# Patient Record
Sex: MALE | Race: WHITE | URBAN - METROPOLITAN AREA
[De-identification: names, ages, dates, MRNs, and addresses within clinical notes are randomized per-mention and may not be internally consistent; named-entity substitution may affect disease eponyms.]

---

## 2017-05-24 ENCOUNTER — ED HISTORICAL/CONVERTED ENCOUNTER (OUTPATIENT)
Dept: OTHER | Age: 57
End: 2017-05-24

## 2023-02-21 ENCOUNTER — APPOINTMENT (OUTPATIENT)
Dept: CT IMAGING | Age: 63
DRG: 377 | End: 2023-02-21
Attending: STUDENT IN AN ORGANIZED HEALTH CARE EDUCATION/TRAINING PROGRAM
Payer: MEDICARE

## 2023-02-21 ENCOUNTER — APPOINTMENT (OUTPATIENT)
Dept: GENERAL RADIOLOGY | Age: 63
DRG: 377 | End: 2023-02-21
Attending: EMERGENCY MEDICINE
Payer: MEDICARE

## 2023-02-21 ENCOUNTER — HOSPITAL ENCOUNTER (INPATIENT)
Age: 63
LOS: 9 days | Discharge: HOME | DRG: 377 | End: 2023-03-02
Attending: STUDENT IN AN ORGANIZED HEALTH CARE EDUCATION/TRAINING PROGRAM | Admitting: INTERNAL MEDICINE
Payer: MEDICARE

## 2023-02-21 DIAGNOSIS — D64.9 ANEMIA REQUIRING TRANSFUSIONS: Primary | ICD-10-CM

## 2023-02-21 LAB
ALBUMIN SERPL-MCNC: 3.5 G/DL (ref 3.5–5)
ALBUMIN/GLOB SERPL: 1 (ref 1.1–2.2)
ALP SERPL-CCNC: 187 U/L (ref 45–117)
ALT SERPL-CCNC: 466 U/L (ref 12–78)
ANION GAP SERPL CALC-SCNC: 11 MMOL/L (ref 5–15)
APPEARANCE UR: ABNORMAL
AST SERPL W P-5'-P-CCNC: 277 U/L (ref 15–37)
BACTERIA URNS QL MICRO: NEGATIVE /HPF
BASOPHILS # BLD: 0 K/UL (ref 0–0.1)
BASOPHILS NFR BLD: 0 % (ref 0–1)
BILIRUB SERPL-MCNC: 1 MG/DL (ref 0.2–1)
BILIRUB UR QL: NEGATIVE
BNP SERPL-MCNC: 1621 PG/ML
BUN SERPL-MCNC: 18 MG/DL (ref 6–20)
BUN/CREAT SERPL: 23 (ref 12–20)
CA-I BLD-MCNC: 8.7 MG/DL (ref 8.5–10.1)
CHLORIDE SERPL-SCNC: 108 MMOL/L (ref 97–108)
CO2 SERPL-SCNC: 17 MMOL/L (ref 21–32)
COLOR UR: ABNORMAL
CREAT SERPL-MCNC: 0.78 MG/DL (ref 0.7–1.3)
D DIMER PPP FEU-MCNC: 1.84 UG/ML(FEU)
DIFFERENTIAL METHOD BLD: ABNORMAL
EOSINOPHIL # BLD: 0 K/UL (ref 0–0.4)
EOSINOPHIL NFR BLD: 0 % (ref 0–7)
ERYTHROCYTE [DISTWIDTH] IN BLOOD BY AUTOMATED COUNT: 22.4 % (ref 11.5–14.5)
FLUAV AG NPH QL IA: NEGATIVE
FLUBV AG NOSE QL IA: NEGATIVE
GLOBULIN SER CALC-MCNC: 3.6 G/DL (ref 2–4)
GLUCOSE SERPL-MCNC: 100 MG/DL (ref 65–100)
GLUCOSE UR STRIP.AUTO-MCNC: NEGATIVE MG/DL
HCT VFR BLD AUTO: 8.2 % (ref 36.6–50.3)
HGB BLD-MCNC: 2 G/DL (ref 12.1–17)
HGB UR QL STRIP: NEGATIVE
IMM GRANULOCYTES # BLD AUTO: 0.1 K/UL (ref 0–0.04)
IMM GRANULOCYTES NFR BLD AUTO: 1 % (ref 0–0.5)
KETONES UR QL STRIP.AUTO: NEGATIVE MG/DL
LDH SERPL L TO P-CCNC: 494 U/L (ref 85–241)
LEUKOCYTE ESTERASE UR QL STRIP.AUTO: ABNORMAL
LYMPHOCYTES # BLD: 1.1 K/UL (ref 0.8–3.5)
LYMPHOCYTES NFR BLD: 12 % (ref 12–49)
MCH RBC QN AUTO: 14 PG (ref 26–34)
MCHC RBC AUTO-ENTMCNC: 24.4 G/DL (ref 30–36.5)
MCV RBC AUTO: 57.3 FL (ref 80–99)
MONOCYTES # BLD: 1.2 K/UL (ref 0–1)
MONOCYTES NFR BLD: 13 % (ref 5–13)
NEUTS SEG # BLD: 6.9 K/UL (ref 1.8–8)
NEUTS SEG NFR BLD: 74 % (ref 32–75)
NITRITE UR QL STRIP.AUTO: NEGATIVE
NRBC # BLD: 0.27 K/UL (ref 0–0.01)
NRBC BLD-RTO: 2.9 PER 100 WBC
PH UR STRIP: 5 (ref 5–8)
PLATELET # BLD AUTO: 272 K/UL (ref 150–400)
PMV BLD AUTO: 8.8 FL (ref 8.9–12.9)
POTASSIUM SERPL-SCNC: 4.2 MMOL/L (ref 3.5–5.1)
PROT SERPL-MCNC: 7.1 G/DL (ref 6.4–8.2)
PROT UR STRIP-MCNC: 30 MG/DL
RBC # BLD AUTO: 1.43 M/UL (ref 4.1–5.7)
RBC #/AREA URNS HPF: ABNORMAL /HPF (ref 0–5)
RBC MORPH BLD: ABNORMAL
SARS-COV-2 RDRP RESP QL NAA+PROBE: NOT DETECTED
SODIUM SERPL-SCNC: 136 MMOL/L (ref 136–145)
SP GR UR REFRACTOMETRY: 1.03 (ref 1–1.03)
TROPONIN I SERPL HS-MCNC: 29 NG/L (ref 0–76)
TSH SERPL DL<=0.05 MIU/L-ACNC: 1.77 UIU/ML (ref 0.36–3.74)
UA: UC IF INDICATED,UAUC: ABNORMAL
UROBILINOGEN UR QL STRIP.AUTO: 0.1 EU/DL (ref 0.1–1)
WBC # BLD AUTO: 9.3 K/UL (ref 4.1–11.1)
WBC URNS QL MICRO: ABNORMAL /HPF (ref 0–4)

## 2023-02-21 PROCEDURE — 74011250636 HC RX REV CODE- 250/636: Performed by: STUDENT IN AN ORGANIZED HEALTH CARE EDUCATION/TRAINING PROGRAM

## 2023-02-21 PROCEDURE — 71045 X-RAY EXAM CHEST 1 VIEW: CPT

## 2023-02-21 PROCEDURE — 87635 SARS-COV-2 COVID-19 AMP PRB: CPT

## 2023-02-21 PROCEDURE — 74011250637 HC RX REV CODE- 250/637: Performed by: STUDENT IN AN ORGANIZED HEALTH CARE EDUCATION/TRAINING PROGRAM

## 2023-02-21 PROCEDURE — 36415 COLL VENOUS BLD VENIPUNCTURE: CPT

## 2023-02-21 PROCEDURE — 93005 ELECTROCARDIOGRAM TRACING: CPT

## 2023-02-21 PROCEDURE — 71275 CT ANGIOGRAPHY CHEST: CPT

## 2023-02-21 PROCEDURE — 83615 LACTATE (LD) (LDH) ENZYME: CPT

## 2023-02-21 PROCEDURE — 94761 N-INVAS EAR/PLS OXIMETRY MLT: CPT

## 2023-02-21 PROCEDURE — 87804 INFLUENZA ASSAY W/OPTIC: CPT

## 2023-02-21 PROCEDURE — 74011250637 HC RX REV CODE- 250/637: Performed by: HOSPITALIST

## 2023-02-21 PROCEDURE — 96374 THER/PROPH/DIAG INJ IV PUSH: CPT

## 2023-02-21 PROCEDURE — 87077 CULTURE AEROBIC IDENTIFY: CPT

## 2023-02-21 PROCEDURE — 87186 SC STD MICRODIL/AGAR DIL: CPT

## 2023-02-21 PROCEDURE — 84484 ASSAY OF TROPONIN QUANT: CPT

## 2023-02-21 PROCEDURE — 99285 EMERGENCY DEPT VISIT HI MDM: CPT

## 2023-02-21 PROCEDURE — 82784 ASSAY IGA/IGD/IGG/IGM EACH: CPT

## 2023-02-21 PROCEDURE — 82728 ASSAY OF FERRITIN: CPT

## 2023-02-21 PROCEDURE — 65610000006 HC RM INTENSIVE CARE

## 2023-02-21 PROCEDURE — 36430 TRANSFUSION BLD/BLD COMPNT: CPT

## 2023-02-21 PROCEDURE — 82607 VITAMIN B-12: CPT

## 2023-02-21 PROCEDURE — 81001 URINALYSIS AUTO W/SCOPE: CPT

## 2023-02-21 PROCEDURE — 83880 ASSAY OF NATRIURETIC PEPTIDE: CPT

## 2023-02-21 PROCEDURE — 74011000636 HC RX REV CODE- 636: Performed by: INTERNAL MEDICINE

## 2023-02-21 PROCEDURE — 84443 ASSAY THYROID STIM HORMONE: CPT

## 2023-02-21 PROCEDURE — 74011250636 HC RX REV CODE- 250/636: Performed by: INTERNAL MEDICINE

## 2023-02-21 PROCEDURE — 74011250637 HC RX REV CODE- 250/637: Performed by: INTERNAL MEDICINE

## 2023-02-21 PROCEDURE — 87086 URINE CULTURE/COLONY COUNT: CPT

## 2023-02-21 PROCEDURE — 83540 ASSAY OF IRON: CPT

## 2023-02-21 PROCEDURE — 86900 BLOOD TYPING SEROLOGIC ABO: CPT

## 2023-02-21 PROCEDURE — 83521 IG LIGHT CHAINS FREE EACH: CPT

## 2023-02-21 PROCEDURE — 86923 COMPATIBILITY TEST ELECTRIC: CPT

## 2023-02-21 PROCEDURE — 80053 COMPREHEN METABOLIC PANEL: CPT

## 2023-02-21 PROCEDURE — P9016 RBC LEUKOCYTES REDUCED: HCPCS

## 2023-02-21 PROCEDURE — 74011000250 HC RX REV CODE- 250: Performed by: HOSPITALIST

## 2023-02-21 PROCEDURE — 85025 COMPLETE CBC W/AUTO DIFF WBC: CPT

## 2023-02-21 PROCEDURE — 30233N1 TRANSFUSION OF NONAUTOLOGOUS RED BLOOD CELLS INTO PERIPHERAL VEIN, PERCUTANEOUS APPROACH: ICD-10-PCS | Performed by: INTERNAL MEDICINE

## 2023-02-21 PROCEDURE — 85379 FIBRIN DEGRADATION QUANT: CPT

## 2023-02-21 RX ORDER — FUROSEMIDE 10 MG/ML
40 INJECTION INTRAMUSCULAR; INTRAVENOUS DAILY
Status: ACTIVE | OUTPATIENT
Start: 2023-02-21 | End: 2023-02-23

## 2023-02-21 RX ORDER — FUROSEMIDE 10 MG/ML
40 INJECTION INTRAMUSCULAR; INTRAVENOUS DAILY
Status: DISCONTINUED | OUTPATIENT
Start: 2023-02-22 | End: 2023-02-21

## 2023-02-21 RX ORDER — ACETAMINOPHEN 325 MG/1
975 TABLET ORAL
Status: COMPLETED | OUTPATIENT
Start: 2023-02-21 | End: 2023-02-21

## 2023-02-21 RX ORDER — SODIUM CHLORIDE 0.9 % (FLUSH) 0.9 %
5-40 SYRINGE (ML) INJECTION AS NEEDED
Status: DISCONTINUED | OUTPATIENT
Start: 2023-02-21 | End: 2023-03-02 | Stop reason: HOSPADM

## 2023-02-21 RX ORDER — ACETAMINOPHEN 325 MG/1
650 TABLET ORAL
Status: DISCONTINUED | OUTPATIENT
Start: 2023-02-21 | End: 2023-03-02 | Stop reason: HOSPADM

## 2023-02-21 RX ORDER — LIDOCAINE 4 G/100G
2 PATCH TOPICAL
Status: DISCONTINUED | OUTPATIENT
Start: 2023-02-21 | End: 2023-03-02 | Stop reason: HOSPADM

## 2023-02-21 RX ORDER — LANOLIN ALCOHOL/MO/W.PET/CERES
6 CREAM (GRAM) TOPICAL
Status: DISCONTINUED | OUTPATIENT
Start: 2023-02-21 | End: 2023-03-02 | Stop reason: HOSPADM

## 2023-02-21 RX ORDER — SODIUM CHLORIDE 9 MG/ML
250 INJECTION, SOLUTION INTRAVENOUS AS NEEDED
Status: DISCONTINUED | OUTPATIENT
Start: 2023-02-21 | End: 2023-03-02 | Stop reason: HOSPADM

## 2023-02-21 RX ORDER — PROMETHAZINE HYDROCHLORIDE 25 MG/1
12.5 TABLET ORAL
Status: DISCONTINUED | OUTPATIENT
Start: 2023-02-21 | End: 2023-03-02 | Stop reason: HOSPADM

## 2023-02-21 RX ORDER — SODIUM CHLORIDE 0.9 % (FLUSH) 0.9 %
5-40 SYRINGE (ML) INJECTION EVERY 8 HOURS
Status: DISCONTINUED | OUTPATIENT
Start: 2023-02-21 | End: 2023-03-02 | Stop reason: HOSPADM

## 2023-02-21 RX ORDER — KETOROLAC TROMETHAMINE 30 MG/ML
15 INJECTION, SOLUTION INTRAMUSCULAR; INTRAVENOUS
Status: COMPLETED | OUTPATIENT
Start: 2023-02-21 | End: 2023-02-21

## 2023-02-21 RX ORDER — ONDANSETRON 2 MG/ML
4 INJECTION INTRAMUSCULAR; INTRAVENOUS
Status: DISCONTINUED | OUTPATIENT
Start: 2023-02-21 | End: 2023-03-02 | Stop reason: HOSPADM

## 2023-02-21 RX ORDER — POLYETHYLENE GLYCOL 3350 17 G/17G
17 POWDER, FOR SOLUTION ORAL DAILY PRN
Status: DISCONTINUED | OUTPATIENT
Start: 2023-02-21 | End: 2023-03-02 | Stop reason: HOSPADM

## 2023-02-21 RX ORDER — ACETAMINOPHEN 650 MG/1
650 SUPPOSITORY RECTAL
Status: DISCONTINUED | OUTPATIENT
Start: 2023-02-21 | End: 2023-03-02 | Stop reason: HOSPADM

## 2023-02-21 RX ADMIN — KETOROLAC TROMETHAMINE 15 MG: 30 INJECTION, SOLUTION INTRAMUSCULAR; INTRAVENOUS at 14:34

## 2023-02-21 RX ADMIN — ACETAMINOPHEN 975 MG: 325 TABLET ORAL at 14:34

## 2023-02-21 RX ADMIN — MELATONIN TAB 3 MG 6 MG: 3 TAB at 22:47

## 2023-02-21 RX ADMIN — IOPAMIDOL 100 ML: 755 INJECTION, SOLUTION INTRAVENOUS at 16:46

## 2023-02-21 RX ADMIN — ACETAMINOPHEN 650 MG: 325 TABLET ORAL at 22:47

## 2023-02-21 RX ADMIN — FUROSEMIDE 40 MG: 10 INJECTION, SOLUTION INTRAMUSCULAR; INTRAVENOUS at 20:37

## 2023-02-21 NOTE — ED NOTES
Hematologist at bedside discussing plan of care. Pt denies any needs or concerns at this time. Blood continues to infuse without difficulty.

## 2023-02-21 NOTE — PROGRESS NOTES
Admission Medication Reconciliation:    Information obtained from:  Patient    Comments/Recommendations: Reviewed PTA medications and patient's allergies. No medications and no allergies    Pharmacy: [facility])      Allergies:  Patient has no known allergies. Significant PMH/Disease States: No past medical history on file.   Chief Complaint for this Admission:    Chief Complaint   Patient presents with    Shortness of Breath     Prior to Admission Medications:   None       Yahaira Hernandez

## 2023-02-21 NOTE — PROGRESS NOTES
Reason for Admission:  SOB                     RUR Score:   N/A                  Plan for utilizing home health: Not at this time         PCP: First and Last name:  None     Name of Practice:    Are you a current patient: Yes/No:    Approximate date of last visit:    Can you participate in a virtual visit with your PCP:                     Current Advanced Directive/Advance Care Plan: No Order      Healthcare Decision Maker:   Click here to complete [address] including selection of the [address] Relationship (ie \"Primary\")         Rubin Norris. Son, [phone]                    Transition of Care Plan:      Met f/f with Pt, he confirmed that the information on the face sheet is correct. Pt stated that he lives by himself. No HH, he has a wheelchair, and independent with ADL    Send Rx to [facility] in [facility] and [facility]. Family will transport when D/C. CM dispo: Home with no needs.

## 2023-02-21 NOTE — PROGRESS NOTES
History & Physical    Primary Care Provider: None  Source of Information: Patient/family     History of Presenting Illness:   Blayne Granger is a 60 y.o. male who presents with shortness of breath. He had past medical history of COPD and paraplegia. He reports to be experiencing worsening shortness of breath on exertion and must rest to regain his breath. He reports to be having difficult time forming sentences while short of breath. He reports chronic pain in his left axillae, left lower extremity and low back at the level of t10-l1 which apparently is the same level of his spinal damage. He is not very mobile and uses a wheel chair when not using leg bracing. He denies cough, chest pain, changes in bowel habits, changes in urinary frequency, no melena, or streaking in stool. He is passing flatus. 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 He was previously a 1ppd smoker for 20 years but quit 35 years ago. He does not drink alcohol. He does not use recreational drugs. He denies vaccinations and refuses offers for age-appropriate vaccinations (flu, covid, pneumococcal 13). His diet is poor per himself and his son. He does not have a PCP. He has history of amputations of the toes. Review of Systems:  Pertinent items are noted in the History of Present Illness. No past medical history on file. No past surgical history on file. Prior to Admission medications    Not on File       No Known Allergies     No family history on file. Social History     Socioeconomic History    Marital status: SINGLE            CODE STATUS:  DNR    Full x   Other      Objective:     Physical Exam:     Visit Vitals  /61   Pulse 99   Temp 98.2 °F (36.8 °C)   Resp 12   Ht 5' 8\" (1.727 m)   Wt 54.4 kg (120 lb)   SpO2 100%   BMI 18.25 kg/m²      O2 Device: None (Room air)    General:  Alert, cooperative, no distress, appears stated age. Cachetic. Head:  Normocephalic, without obvious abnormality, atraumatic. Eyes:  Conjunctivae/corneas clear. PERRL, EOMs intact. Nose: Nares normal. Septum midline. Mucosa normal. No drainage or sinus tenderness. Throat: Lips, mucosa, and tongue normal. Teeth and gums normal.   Neck: Supple, symmetrical, trachea midline, no adenopathy, thyroid: no enlargement/tenderness/nodules, no carotid bruit and no JVD. Back:   Symmetric, no curvature. ROM normal. No CVA tenderness. Lungs:   Clear to auscultation bilaterally. Chest wall:  No tenderness or deformity. Heart:  Regular rate and rhythm, S1, S2 normal, no murmur, click, rub or gallop. Abdomen:   Soft, non-tender. Bowel sounds normal. No masses,  No organomegaly. Extremities: Extremities normal, atraumatic, no cyanosis or edema. Pulses: 2+ and symmetric all extremities. Skin: Skin color pale, texture normal, turgor normal. No rashes or lesions   Neurologic: CNII-XII intact. Lower extremity paraplegia.         24 Hour Results:    Recent Results (from the past 24 hour(s))   TYPE & SCREEN    Collection Time: 02/21/23  1:08 PM   Result Value Ref Range    Crossmatch Expiration 02/24/2023,2359     ABO/Rh(D) A Positive     Antibody screen Negative     Unit number [id number]     Blood component type [facility]     Unit division 00     Status of unit [place] to transfuse     Crossmatch result Compatible     Unit number [id number]     Blood component type [facility]     Unit division 00     Status of unit [place] to transfuse     Crossmatch result Compatible     Unit number [id number]     Blood component type [facility]     Unit division 00     Status of unit [address] to transfuse     Crossmatch result Compatible    METABOLIC PANEL, COMPREHENSIVE    Collection Time: 02/21/23  1:09 PM   Result Value Ref Range    Sodium 136 136 - 145 mmol/L    Potassium 4.2 3.5 - 5.1 mmol/L    Chloride 108 97 - 108 mmol/L    CO2 17 (L) 21 - 32 mmol/L    Anion gap 11 5 - 15 mmol/L    Glucose 100 65 - 100 mg/dL    BUN 18 6 - 20 mg/dL    Creatinine 0.78 0.70 - 1.30 mg/dL    BUN/Creatinine ratio 23 (H) 12 - 20      eGFR >60 >60 ml/min/1.73m2    Calcium 8.7 8.5 - 10.1 mg/dL    Bilirubin, total 1.0 0.2 - 1.0 mg/dL    AST (SGOT) 277 (H) 15 - 37 U/L    ALT (SGPT) 466 (H) 12 - 78 U/L    Alk. phosphatase 187 (H) 45 - 117 U/L    Protein, total 7.1 6.4 - 8.2 g/dL    Albumin 3.5 3.5 - 5.0 g/dL    Globulin 3.6 2.0 - 4.0 g/dL    A-G Ratio 1.0 (L) 1.1 - 2.2     TROPONIN-HIGH SENSITIVITY    Collection Time: 02/21/23  1:09 PM   Result Value Ref Range    Troponin-High Sensitivity 29 0 - 76 ng/L   D DIMER    Collection Time: 02/21/23  2:32 PM   Result Value Ref Range    D DIMER 1.84 (H) <0.50 ug/ml(FEU)   COVID-19 RAPID TEST    Collection Time: 02/21/23  2:32 PM   Result Value Ref Range    COVID-19 rapid test Not Detected Not Detected     INFLUENZA A & B AG (RAPID TEST)    Collection Time: 02/21/23  2:32 PM   Result Value Ref Range    Influenza A Antigen Negative Negative      Influenza B Antigen Negative Negative     NT-PRO BNP    Collection Time: 02/21/23  2:32 PM   Result Value Ref Range    NT pro-BNP 1,621 (H) <125 pg/mL   CBC WITH AUTOMATED DIFF    Collection Time: 02/21/23  3:47 PM   Result Value Ref Range    WBC 9.3 4.1 - 11.1 K/uL    RBC 1.43 (L) 4.10 - 5.70 M/uL    HGB 2.0 (LL) 12.1 - 17.0 g/dL    HCT 8.2 (LL) 36.6 - 50.3 %    MCV 57.3 (L) 80.0 - 99.0 FL    MCH 14.0 (L) 26.0 - 34.0 PG    MCHC 24.4 (L) 30.0 - 36.5 g/dL    RDW 22.4 (H) 11.5 - 14.5 %    PLATELET 992 501 - 114 K/uL    MPV 8.8 (L) 8.9 - 12.9 FL    NRBC 2.9 (H) 0.0  WBC    ABSOLUTE NRBC 0.27 (H) 0.00 - 0.01 K/uL    NEUTROPHILS 74 32 - 75 %    LYMPHOCYTES 12 12 - 49 %    MONOCYTES 13 5 - 13 %    EOSINOPHILS 0 0 - 7 %    BASOPHILS 0 0 - 1 %    IMMATURE GRANULOCYTES 1 (H) 0 - 0.5 %    ABS. NEUTROPHILS 6.9 1.8 - 8.0 K/UL    ABS. LYMPHOCYTES 1.1 0.8 - 3.5 K/UL    ABS. MONOCYTES 1.2 (H) 0.0 - 1.0 K/UL    ABS. EOSINOPHILS 0.0 0.0 - 0.4 K/UL    ABS. BASOPHILS 0.0 0.0 - 0.1 K/UL    ABS. IMM. GRANS. 0.1 (H) 0.00 - 0.04 K/UL    DF Smear Scanned      RBC COMMENTS Anisocytosis  2+        RBC COMMENTS Hypochromia  4+        RBC COMMENTS Microcytosis  3+        RBC COMMENTS Polychromasia  1+        RBC COMMENTS Schistocytes  Present             Imaging:   XR CHEST SNGL V   Final Result   Central pulmonary vascular congestion versus perihilar opacities   that can be seen with a viral process. CTA CHEST W OR W WO CONT    (Results Pending)           Assessment:     Acute exacerbation of COPD  Poor nutrition  Anemia of chronic disease  Paraplegia lower extremities      Discussion/Medical Decision Making: Patient with numerous medical comorbidities, each with increased risk for mortality and morbidity if left untreated. Patient requires medications with high risk of toxicity and need for intensive monitoring. I have reviewed patient's presenting subjective and objective findings, as well as all laboratory studies, imaging studies, and vital signs to date as well as treatment rendered and patient's response to those treatments. In addition, prior medical, surgical and relevant social and family histories were reviewed. Plan:     Begin solumedrol  Begin abx  Begin high calorie, high protein diet  Trend BNP, AM repeat  Consult Heme/Onc pending CT read        CODE STATUS:    Social determinants of health: None known      Home medications were reviewed    Signed By: Donovan Newell     February 21, 2023         *ATTENTION:  This note has been created by a medical student for educational purposes only. Please do not refer to the content of this note for clinical decision-making, billing, or other purposes. Please see attending physicians note to obtain clinical information on this patient. *

## 2023-02-21 NOTE — H&P
History & Physical    Primary Care Provider: None  Source of Information: Patient/family     History of Presenting Illness:   Narda Monteiro is a 60 y.o. male who presents with shortness of breath. He had past medical history of COPD and paraplegia. He reports to be experiencing worsening shortness of breath on exertion and must rest to regain his breath. He reports to be having difficult time forming sentences while short of breath. He reports chronic pain in his left axillae, left lower extremity and low back at the level of t10-l1 which apparently is the same level of his spinal damage. He is not very mobile and uses a wheel chair when not using leg bracing. He denies cough, chest pain, changes in bowel habits, changes in urinary frequency, no melena, or streaking in stool. He is passing flatus. 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 He was previously a 1ppd smoker for 20 years but quit 35 years ago. He does not drink alcohol. He does not use recreational drugs. He denies vaccinations and refuses offers for age-appropriate vaccinations (flu, covid, pneumococcal 13). His diet is poor per himself and his son. He does not have a PCP. He has history of amputations of the toes. Review of Systems:  Pertinent items are noted in the History of Present Illness. No past medical history on file. No past surgical history on file. Prior to Admission medications    Not on File       No Known Allergies     No family history on file. Social History     Socioeconomic History    Marital status: SINGLE            CODE STATUS:  DNR    Full x   Other      Objective:     Physical Exam:     Visit Vitals  /65   Pulse 100   Temp 98.3 °F (36.8 °C)   Resp 12   Ht 5' 8\" (1.727 m)   Wt 54.4 kg (120 lb)   SpO2 100%   BMI 18.25 kg/m²      O2 Device: None (Room air)    General:  Alert, cooperative, no distress, appears stated age. Cachetic. Head:  Normocephalic, without obvious abnormality, atraumatic. Eyes:  Conjunctivae/corneas clear. PERRL, EOMs intact. Nose: Nares normal. Septum midline. Mucosa normal. No drainage or sinus tenderness. Throat: Lips, mucosa, and tongue normal. Teeth and gums normal.   Neck: Supple, symmetrical, trachea midline, no adenopathy, thyroid: no enlargement/tenderness/nodules, no carotid bruit and no JVD. Back:   Symmetric, no curvature. ROM normal. No CVA tenderness. Lungs:   Clear to auscultation bilaterally. Chest wall:  No tenderness or deformity. Heart:  Regular rate and rhythm, S1, S2 normal, no murmur, click, rub or gallop. Abdomen:   Soft, non-tender. Bowel sounds normal. No masses,  No organomegaly. Extremities: Extremities normal, atraumatic, no cyanosis or edema. Pulses: 2+ and symmetric all extremities. Skin: Skin color pale, texture normal, turgor normal. No rashes or lesions   Neurologic: CNII-XII intact. Lower extremity paraplegia.       BL foot drop  Indwelling urinary catheter  24 Hour Results:    Recent Results (from the past 24 hour(s))   TYPE & SCREEN    Collection Time: 02/21/23  1:08 PM   Result Value Ref Range    Crossmatch Expiration 02/24/2023,2359     ABO/Rh(D) A Positive     Antibody screen Negative     Unit number [id number]     Blood component type [facility]     Unit division 00     Status of unit [place] to transfuse     Crossmatch result Compatible     Unit number [id number]     Blood component type [facility]     Unit division 00     Status of unit [address] to transfuse     Crossmatch result Compatible     Unit number [id number]     Blood component type [facility]     Unit division 00     Status of unit [address] to transfuse     Crossmatch result Compatible    METABOLIC PANEL, COMPREHENSIVE    Collection Time: 02/21/23  1:09 PM   Result Value Ref Range    Sodium 136 136 - 145 mmol/L    Potassium 4.2 3.5 - 5.1 mmol/L    Chloride 108 97 - 108 mmol/L    CO2 17 (L) 21 - 32 mmol/L    Anion gap 11 5 - 15 mmol/L    Glucose 100 65 - 100 mg/dL    BUN 18 6 - 20 mg/dL    Creatinine 0.78 0.70 - 1.30 mg/dL    BUN/Creatinine ratio 23 (H) 12 - 20      eGFR >60 >60 ml/min/1.73m2    Calcium 8.7 8.5 - 10.1 mg/dL    Bilirubin, total 1.0 0.2 - 1.0 mg/dL    AST (SGOT) 277 (H) 15 - 37 U/L    ALT (SGPT) 466 (H) 12 - 78 U/L    Alk. phosphatase 187 (H) 45 - 117 U/L    Protein, total 7.1 6.4 - 8.2 g/dL    Albumin 3.5 3.5 - 5.0 g/dL    Globulin 3.6 2.0 - 4.0 g/dL    A-G Ratio 1.0 (L) 1.1 - 2.2     TROPONIN-HIGH SENSITIVITY    Collection Time: 02/21/23  1:09 PM   Result Value Ref Range    Troponin-High Sensitivity 29 0 - 76 ng/L   D DIMER    Collection Time: 02/21/23  2:32 PM   Result Value Ref Range    D DIMER 1.84 (H) <0.50 ug/ml(FEU)   COVID-19 RAPID TEST    Collection Time: 02/21/23  2:32 PM   Result Value Ref Range    COVID-19 rapid test Not Detected Not Detected     INFLUENZA A & B AG (RAPID TEST)    Collection Time: 02/21/23  2:32 PM   Result Value Ref Range    Influenza A Antigen Negative Negative      Influenza B Antigen Negative Negative     NT-PRO BNP    Collection Time: 02/21/23  2:32 PM   Result Value Ref Range    NT pro-BNP 1,621 (H) <125 pg/mL   CBC WITH AUTOMATED DIFF    Collection Time: 02/21/23  3:47 PM   Result Value Ref Range    WBC 9.3 4.1 - 11.1 K/uL    RBC 1.43 (L) 4.10 - 5.70 M/uL    HGB 2.0 (LL) 12.1 - 17.0 g/dL    HCT 8.2 (LL) 36.6 - 50.3 %    MCV 57.3 (L) 80.0 - 99.0 FL    MCH 14.0 (L) 26.0 - 34.0 PG    MCHC 24.4 (L) 30.0 - 36.5 g/dL    RDW 22.4 (H) 11.5 - 14.5 %    PLATELET 968 576 - 516 K/uL    MPV 8.8 (L) 8.9 - 12.9 FL    NRBC 2.9 (H) 0.0  WBC    ABSOLUTE NRBC 0.27 (H) 0.00 - 0.01 K/uL    NEUTROPHILS 74 32 - 75 %    LYMPHOCYTES 12 12 - 49 %    MONOCYTES 13 5 - 13 %    EOSINOPHILS 0 0 - 7 %    BASOPHILS 0 0 - 1 %    IMMATURE GRANULOCYTES 1 (H) 0 - 0.5 %    ABS. NEUTROPHILS 6.9 1.8 - 8.0 K/UL    ABS. LYMPHOCYTES 1.1 0.8 - 3.5 K/UL    ABS. MONOCYTES 1.2 (H) 0.0 - 1.0 K/UL    ABS. EOSINOPHILS 0.0 0.0 - 0.4 K/UL    ABS. BASOPHILS 0.0 0.0 - 0.1 K/UL    ABS. IMM. GRANS. 0.1 (H) 0.00 - 0.04 K/UL    DF Smear Scanned      RBC COMMENTS Anisocytosis  2+        RBC COMMENTS Hypochromia  4+        RBC COMMENTS Microcytosis  3+        RBC COMMENTS Polychromasia  1+        RBC COMMENTS Schistocytes  Present             Imaging:   XR CHEST SNGL V   Final Result   Central pulmonary vascular congestion versus perihilar opacities   that can be seen with a viral process. CTA CHEST W OR W WO CONT    (Results Pending)           Assessment:   Acute CHF, high output due to severe anemia  Severe chronic anemia  Congestive Hepatitis  Acute exacerbation of COPD  Protein calorie malnutrition  Acute on Chronic anemia  Paraperesis lower extremities  BL foot drop  Indwelling luu cathater      Discussion/Medical Decision Making: Patient with numerous medical comorbidities, each with increased risk for mortality and morbidity if left untreated. Patient requires medications with high risk of toxicity and need for intensive monitoring. I have reviewed patient's presenting subjective and objective findings, as well as all laboratory studies, imaging studies, and vital signs to date as well as treatment rendered and patient's response to those treatments. In addition, prior medical, surgical and relevant social and family histories were reviewed. Plan: We will proceed with transfusion. We will give Lasix  Obtain B12 and folate, TSH,   await read on CTA  Consult Heme/Onc pending CT read  We will not give IV fluids at this time because he is a risk of fluid overload.           CODE STATUS: Full    Social determinants of health: None known      Home medications were reviewed    Signed By: Pura Amaya MD     February 21, 2023

## 2023-02-21 NOTE — ED PROVIDER NOTES
[facility] EMERGENCY DEPT  EMERGENCY DEPARTMENT HISTORY AND PHYSICAL EXAM      Date: 2/21/2023  Patient Name: Pretty Beltran  MRN: [id number]  [place] 1960  Date of evaluation: 2/21/2023  Provider: Richard Zeng MD   Note Started: 2:48 PM 2/21/23    HISTORY OF PRESENT ILLNESS     Chief Complaint   Patient presents with    Shortness of Breath       History Provided By: Patient    HPI: Pretty Beltran, 60 y.o. male with history of partial lower extremity paraplegia presents for evaluation of dyspnea. He reports feeling intermittently short of breath over the last week. This happens with any level of cardiac activity as well as rolling over in bed. He is able to lay flat without feeling short of breath. He experienced left-sided chest pain yesterday that is intermittent, underneath his armpit. This is since resolved. No interventions attempted at home. No history of cardiac disease personally or in his family. Tanda Bun He reports possible history of of COPD diagnosis, but does not currently take any medications for this. No history of blood clots, no lower extremity pain or edema no prolonged immobilizations. PAST MEDICAL HISTORY   Past Medical History:  No past medical history on file. Past Surgical History:  No past surgical history on file. Family History:  No family history on file. Social History: Allergies:  No Known Allergies    PCP: None    Current Meds:   Previous Medications    No medications on file       REVIEW OF SYSTEMS   Review of Systems   Constitutional:  Negative for fever. HENT:  Negative for congestion. Respiratory:  Positive for shortness of breath. Negative for cough. Cardiovascular:  Negative for chest pain. Gastrointestinal:  Negative for abdominal pain, constipation, nausea and vomiting. Genitourinary:  Negative for dysuria. Musculoskeletal:  Negative for arthralgias and myalgias. Skin:  Negative for rash.    Allergic/Immunologic: Negative for immunocompromised state. Neurological:  Negative for syncope. Psychiatric/Behavioral:  Negative for confusion. Positives and Pertinent negatives as per HPI. PHYSICAL EXAM     ED Triage Vitals [02/21/23 1308]   ED Encounter Vitals Group      /61      Pulse (Heart Rate) (!) 104      Resp Rate 28      Temp 99.2 °F (37.3 °C)      Temp src       O2 Sat (%) 100 %      Weight 120 lb      Height 5' 8\"     Physical Exam  Vitals reviewed. Constitutional:       General: He is not in acute distress. Appearance: He is not toxic-appearing. HENT:      Head: Normocephalic and atraumatic. Eyes:      Extraocular Movements: Extraocular movements intact. Pupils: Pupils are equal, round, and reactive to light. Cardiovascular:      Rate and Rhythm: Regular rhythm. Tachycardia present. Heart sounds: Normal heart sounds. Pulmonary:      Effort: Pulmonary effort is normal.      Breath sounds: Normal breath sounds. Abdominal:      Palpations: Abdomen is soft. Tenderness: There is no abdominal tenderness. Musculoskeletal:      Right lower leg: No edema. Left lower leg: No edema. Skin:     General: Skin is warm and dry. Coloration: Skin is pale. Neurological:      General: No focal deficit present. Mental Status: He is alert.    Psychiatric:         Mood and Affect: Mood normal.         Behavior: Behavior normal.          SCREENINGS               No data recorded        LAB, EKG AND DIAGNOSTIC RESULTS   Labs:  Recent Results (from the past 12 hour(s))   TYPE & SCREEN    Collection Time: 02/21/23  1:08 PM   Result Value Ref Range    Crossmatch Expiration 02/24/2023,2359     ABO/Rh(D) A Positive     Antibody screen Negative     Unit number [id number]     Blood component type [facility]     Unit division 00     Status of unit [place] to transfuse     Crossmatch result Compatible     Unit number [id number]     Blood component type  LR     Unit division 00     Status of unit Allocated     TRANSFUSION STATUS Ok to transfuse     Crossmatch result Compatible     Unit number [id number]     Blood component type RC LR     Unit division 00     Status of unit [address] to transfuse     Crossmatch result Compatible    METABOLIC PANEL, COMPREHENSIVE    Collection Time: 02/21/23  1:09 PM   Result Value Ref Range    Sodium 136 136 - 145 mmol/L    Potassium 4.2 3.5 - 5.1 mmol/L    Chloride 108 97 - 108 mmol/L    CO2 17 (L) 21 - 32 mmol/L    Anion gap 11 5 - 15 mmol/L    Glucose 100 65 - 100 mg/dL    BUN 18 6 - 20 mg/dL    Creatinine 0.78 0.70 - 1.30 mg/dL    BUN/Creatinine ratio 23 (H) 12 - 20      eGFR >60 >60 ml/min/1.73m2    Calcium 8.7 8.5 - 10.1 mg/dL    Bilirubin, total 1.0 0.2 - 1.0 mg/dL    AST (SGOT) 277 (H) 15 - 37 U/L    ALT (SGPT) 466 (H) 12 - 78 U/L    Alk.  phosphatase 187 (H) 45 - 117 U/L    Protein, total 7.1 6.4 - 8.2 g/dL    Albumin 3.5 3.5 - 5.0 g/dL    Globulin 3.6 2.0 - 4.0 g/dL    A-G Ratio 1.0 (L) 1.1 - 2.2     TROPONIN-HIGH SENSITIVITY    Collection Time: 02/21/23  1:09 PM   Result Value Ref Range    Troponin-High Sensitivity 29 0 - 76 ng/L   D DIMER    Collection Time: 02/21/23  2:32 PM   Result Value Ref Range    D DIMER 1.84 (H) <0.50 ug/ml(FEU)   COVID-19 RAPID TEST    Collection Time: 02/21/23  2:32 PM   Result Value Ref Range    COVID-19 rapid test Not Detected Not Detected     INFLUENZA A & B AG (RAPID TEST)    Collection Time: 02/21/23  2:32 PM   Result Value Ref Range    Influenza A Antigen Negative Negative      Influenza B Antigen Negative Negative     NT-PRO BNP    Collection Time: 02/21/23  2:32 PM   Result Value Ref Range    NT pro-BNP 1,621 (H) <125 pg/mL   CBC WITH AUTOMATED DIFF    Collection Time: 02/21/23  3:47 PM   Result Value Ref Range    WBC 9.3 4.1 - 11.1 K/uL    RBC 1.43 (L) 4.10 - 5.70 M/uL    HGB 2.0 (LL) 12.1 - 17.0 g/dL    HCT 8.2 (LL) 36.6 - 50.3 %    MCV 57.3 (L) 80.0 - 99.0 FL    MCH 14.0 (L) 26.0 - 34.0 PG    MCHC 24.4 (L) 30.0 - 36.5 g/dL    RDW 22.4 (H) 11.5 - 14.5 %    PLATELET 666 109 - 820 K/uL    MPV 8.8 (L) 8.9 - 12.9 FL    NRBC 2.9 (H) 0.0  WBC    ABSOLUTE NRBC 0.27 (H) 0.00 - 0.01 K/uL    NEUTROPHILS 74 32 - 75 %    LYMPHOCYTES 12 12 - 49 %    MONOCYTES 13 5 - 13 %    EOSINOPHILS 0 0 - 7 %    BASOPHILS 0 0 - 1 %    IMMATURE GRANULOCYTES 1 (H) 0 - 0.5 %    ABS. NEUTROPHILS 6.9 1.8 - 8.0 K/UL    ABS. LYMPHOCYTES 1.1 0.8 - 3.5 K/UL    ABS. MONOCYTES 1.2 (H) 0.0 - 1.0 K/UL    ABS. EOSINOPHILS 0.0 0.0 - 0.4 K/UL    ABS. BASOPHILS 0.0 0.0 - 0.1 K/UL    ABS. IMM. GRANS. 0.1 (H) 0.00 - 0.04 K/UL    DF Smear Scanned      RBC COMMENTS Anisocytosis  2+        RBC COMMENTS Hypochromia  4+        RBC COMMENTS Microcytosis  3+        RBC COMMENTS Polychromasia  1+        RBC COMMENTS Schistocytes  Present           Radiologic Studies:  Interpretation per the Radiologist below, if available at the time of this note:  XR CHEST SNGL V    Result Date: 2/21/2023  EXAM:  XR CHEST SNGL V INDICATION: Shortness of breath COMPARISON: None. TECHNIQUE: Portable AP upright chest view at 1344 hours FINDINGS: The cardiomediastinal contours are within normal limits. There is central pulmonary vascular congestion versus perihilar opacities. There is no pleural effusion or pneumothorax. There are degenerative changes in the left shoulder. The upper abdomen is unremarkable. Central pulmonary vascular congestion versus perihilar opacities that can be seen with a viral process.        PROCEDURES   Unless otherwise noted below, none  Performed by: Rg Bucio MD   Procedures        EMERGENCY DEPARTMENT COURSE and DIFFERENTIAL DIAGNOSIS/MDM   Vitals:    Vitals:    02/21/23 1320 02/21/23 1715 02/21/23 1730 02/21/23 1800   BP:  112/61 121/65 122/70   Pulse:  99 100 (!) 105   Resp:  12 12 14   Temp:  98.2 °F (36.8 °C) 98.3 °F (36.8 °C) 98.3 °F (36.8 °C)   SpO2: 100% 100% 100% 100%   Weight:       Height: Patient was given the following medications:  Medications   0.9% sodium chloride infusion 250 mL (has no administration in time range)   furosemide (LASIX) injection 40 mg (has no administration in time range)   sodium chloride (NS) flush 5-40 mL (has no administration in time range)   sodium chloride (NS) flush 5-40 mL (has no administration in time range)   acetaminophen (TYLENOL) tablet 650 mg (has no administration in time range)     Or   acetaminophen (TYLENOL) suppository 650 mg (has no administration in time range)   polyethylene glycol (MIRALAX) packet 17 g (has no administration in time range)   promethazine (PHENERGAN) tablet 12.5 mg (has no administration in time range)     Or   ondansetron (ZOFRAN) injection 4 mg (has no administration in time range)   ketorolac (TORADOL) injection 15 mg (15 mg IntraVENous Given 2/21/23 1434)   acetaminophen (TYLENOL) tablet 975 mg (975 mg Oral Given 2/21/23 1434)   iopamidoL (ISOVUE-370) 370 mg iodine /mL (76 %) injection 100 mL (100 mL IntraVENous Given 2/21/23 1646)       CC/HPI Summary, DDx, ED Course, and Reassessment:   39-year-old male presents for evaluation of dyspnea. Clear lungs on exam so do not suspect restrictive process. He is notably pale. Slightly tachycardic. Differential includes anemia versus significant electrolyte imbalance, liver or kidney dysfunction, pneumonia, PE, process. Initial evaluation with CBC, BMP, BNP, troponin, EKG, chest x-ray, D-dimer  ED Course as of 02/21/23 1843   Tue Feb 21, 2023   1341 ECG performed at 1304 and interpreted by me shows sinus tachycardia with a short MS, interval for MS is 94, ventricular rate is 110, no ST elevation or depression [BQ]   1404 XR CHEST SNGL V  IMPRESSION  Central pulmonary vascular congestion versus perihilar opacities  that can be seen with a viral process.   [BQ]   1607 HGB(!!): 2.0  Will transfuse 3 units and admitted for further work-up [BQ]      ED Course User Index  [BQ] Sarah Summers Ambar Sharma MD           ED FINAL IMPRESSION     1. Anemia requiring transfusions          DISPOSITION/PLAN   Admitted    Admit Note: Pt is being admitted by [facility]. The results of their tests and reason(s) for their admission have been discussed with pt and/or available family. They convey agreement and understanding for the need to be admitted and for the admission diagnosis. I am the Primary Clinician of Record. Holly Medina MD (electronically signed)    (Please note that parts of this dictation were completed with voice recognition software. Quite often unanticipated grammatical, syntax, homophones, and other interpretive errors are inadvertently transcribed by the computer software. Please disregards these errors.  Please excuse any errors that have escaped final proofreading.)

## 2023-02-21 NOTE — ED TRIAGE NOTES
61 y/o paraplegic who summoned EMS for difficxulty breathing whic he has had x1 week or so. Reports dx: COPD approx 10 years ago but takes no meds for this.  Pt is A&O, very pale

## 2023-02-22 LAB
ALBUMIN SERPL-MCNC: 3 G/DL (ref 3.5–5)
ANION GAP SERPL CALC-SCNC: 6 MMOL/L (ref 5–15)
ATRIAL RATE: 110 BPM
BASOPHILS # BLD: 0 K/UL (ref 0–0.1)
BASOPHILS NFR BLD: 1 % (ref 0–1)
BUN SERPL-MCNC: 20 MG/DL (ref 6–20)
BUN/CREAT SERPL: 29 (ref 12–20)
CA-I BLD-MCNC: 7.7 MG/DL (ref 8.5–10.1)
CALCULATED P AXIS, ECG09: 57 DEGREES
CALCULATED R AXIS, ECG10: 17 DEGREES
CALCULATED T AXIS, ECG11: 55 DEGREES
CHLORIDE SERPL-SCNC: 107 MMOL/L (ref 97–108)
CO2 SERPL-SCNC: 24 MMOL/L (ref 21–32)
CREAT SERPL-MCNC: 0.69 MG/DL (ref 0.7–1.3)
DIAGNOSIS, 93000: NORMAL
DIFFERENTIAL METHOD BLD: ABNORMAL
EOSINOPHIL # BLD: 0.4 K/UL (ref 0–0.4)
EOSINOPHIL NFR BLD: 5 % (ref 0–7)
FERRITIN SERPL-MCNC: 4 NG/ML (ref 26–388)
FOLATE SERPL-MCNC: 15.6 NG/ML (ref 5–21)
GLUCOSE SERPL-MCNC: 102 MG/DL (ref 65–100)
HCT VFR BLD AUTO: 18.6 % (ref 36.6–50.3)
HGB BLD-MCNC: 5.8 G/DL (ref 12.1–17)
IMM GRANULOCYTES # BLD AUTO: 0.1 K/UL (ref 0–0.04)
IMM GRANULOCYTES NFR BLD AUTO: 1 % (ref 0–0.5)
IRON SATN MFR SERPL: 2 % (ref 20–50)
IRON SERPL-MCNC: 11 UG/DL (ref 35–150)
LYMPHOCYTES # BLD: 1.1 K/UL (ref 0.8–3.5)
LYMPHOCYTES NFR BLD: 14 % (ref 12–49)
MCH RBC QN AUTO: 22.9 PG (ref 26–34)
MCHC RBC AUTO-ENTMCNC: 31.2 G/DL (ref 30–36.5)
MCV RBC AUTO: 73.5 FL (ref 80–99)
MONOCYTES # BLD: 1 K/UL (ref 0–1)
MONOCYTES NFR BLD: 13 % (ref 5–13)
NEUTS SEG # BLD: 5 K/UL (ref 1.8–8)
NEUTS SEG NFR BLD: 66 % (ref 32–75)
NRBC # BLD: 0.16 K/UL (ref 0–0.01)
NRBC BLD-RTO: 2.1 PER 100 WBC
P-R INTERVAL, ECG05: 94 MS
PHOSPHATE SERPL-MCNC: 3.2 MG/DL (ref 2.6–4.7)
PLATELET # BLD AUTO: 190 K/UL (ref 150–400)
PMV BLD AUTO: 9.5 FL (ref 8.9–12.9)
POTASSIUM SERPL-SCNC: 3.6 MMOL/L (ref 3.5–5.1)
Q-T INTERVAL, ECG07: 334 MS
QRS DURATION, ECG06: 84 MS
QTC CALCULATION (BEZET), ECG08: 452 MS
RBC # BLD AUTO: 2.53 M/UL (ref 4.1–5.7)
SODIUM SERPL-SCNC: 137 MMOL/L (ref 136–145)
TIBC SERPL-MCNC: 461 UG/DL (ref 250–450)
VENTRICULAR RATE, ECG03: 110 BPM
VIT B12 SERPL-MCNC: 1093 PG/ML (ref 193–986)
WBC # BLD AUTO: 7.6 K/UL (ref 4.1–11.1)

## 2023-02-22 PROCEDURE — 80069 RENAL FUNCTION PANEL: CPT

## 2023-02-22 PROCEDURE — 65270000029 HC RM PRIVATE

## 2023-02-22 PROCEDURE — 97530 THERAPEUTIC ACTIVITIES: CPT

## 2023-02-22 PROCEDURE — 36415 COLL VENOUS BLD VENIPUNCTURE: CPT

## 2023-02-22 PROCEDURE — 74011000250 HC RX REV CODE- 250: Performed by: INTERNAL MEDICINE

## 2023-02-22 PROCEDURE — P9016 RBC LEUKOCYTES REDUCED: HCPCS

## 2023-02-22 PROCEDURE — 74011250637 HC RX REV CODE- 250/637: Performed by: HOSPITALIST

## 2023-02-22 PROCEDURE — 36430 TRANSFUSION BLD/BLD COMPNT: CPT

## 2023-02-22 PROCEDURE — 74011250637 HC RX REV CODE- 250/637: Performed by: PSYCHIATRY & NEUROLOGY

## 2023-02-22 PROCEDURE — 85025 COMPLETE CBC W/AUTO DIFF WBC: CPT

## 2023-02-22 PROCEDURE — 30233N1 TRANSFUSION OF NONAUTOLOGOUS RED BLOOD CELLS INTO PERIPHERAL VEIN, PERCUTANEOUS APPROACH: ICD-10-PCS | Performed by: INTERNAL MEDICINE

## 2023-02-22 PROCEDURE — 74011250636 HC RX REV CODE- 250/636: Performed by: INTERNAL MEDICINE

## 2023-02-22 PROCEDURE — 97161 PT EVAL LOW COMPLEX 20 MIN: CPT

## 2023-02-22 PROCEDURE — 74011250637 HC RX REV CODE- 250/637: Performed by: INTERNAL MEDICINE

## 2023-02-22 RX ORDER — OXYCODONE AND ACETAMINOPHEN 5; 325 MG/1; MG/1
1 TABLET ORAL
Status: DISCONTINUED | OUTPATIENT
Start: 2023-02-22 | End: 2023-03-02 | Stop reason: HOSPADM

## 2023-02-22 RX ORDER — RISPERIDONE 1 MG/1
1 TABLET, FILM COATED ORAL
Status: DISCONTINUED | OUTPATIENT
Start: 2023-02-22 | End: 2023-03-02 | Stop reason: HOSPADM

## 2023-02-22 RX ORDER — AMITRIPTYLINE HYDROCHLORIDE 25 MG/1
25 TABLET, FILM COATED ORAL
Status: DISCONTINUED | OUTPATIENT
Start: 2023-02-22 | End: 2023-03-02 | Stop reason: HOSPADM

## 2023-02-22 RX ORDER — DIAZEPAM 2 MG/1
2 TABLET ORAL
Status: DISCONTINUED | OUTPATIENT
Start: 2023-02-22 | End: 2023-03-02 | Stop reason: HOSPADM

## 2023-02-22 RX ORDER — LANOLIN ALCOHOL/MO/W.PET/CERES
1 CREAM (GRAM) TOPICAL
Status: DISCONTINUED | OUTPATIENT
Start: 2023-02-22 | End: 2023-03-01

## 2023-02-22 RX ORDER — DIAZEPAM 5 MG/1
5 TABLET ORAL ONCE
Status: COMPLETED | OUTPATIENT
Start: 2023-02-22 | End: 2023-02-22

## 2023-02-22 RX ADMIN — OXYCODONE AND ACETAMINOPHEN 1 TABLET: 5; 325 TABLET ORAL at 08:17

## 2023-02-22 RX ADMIN — FERROUS SULFATE TAB 325 MG (65 MG ELEMENTAL FE) 325 MG: 325 (65 FE) TAB at 18:44

## 2023-02-22 RX ADMIN — SODIUM CHLORIDE, PRESERVATIVE FREE 10 ML: 5 INJECTION INTRAVENOUS at 21:11

## 2023-02-22 RX ADMIN — OXYCODONE AND ACETAMINOPHEN 1 TABLET: 5; 325 TABLET ORAL at 21:15

## 2023-02-22 RX ADMIN — DIAZEPAM 5 MG: 5 TABLET ORAL at 03:33

## 2023-02-22 RX ADMIN — RISPERIDONE 1 MG: 1 TABLET ORAL at 21:11

## 2023-02-22 RX ADMIN — SODIUM CHLORIDE, PRESERVATIVE FREE 10 ML: 5 INJECTION INTRAVENOUS at 18:45

## 2023-02-22 RX ADMIN — DIAZEPAM 2 MG: 2 TABLET ORAL at 21:11

## 2023-02-22 RX ADMIN — AMITRIPTYLINE HYDROCHLORIDE 25 MG: 50 TABLET, FILM COATED ORAL at 21:11

## 2023-02-22 NOTE — PROGRESS NOTES
Patient has pulled cardiac leads and BP cuff off several times this shift. Education provided to patient. Patient is A&O x4 and verbalizes understanding, however is very irritable and uncooperative. MD is aware. Will continue to monitor. 1300: Patient complaining of fluid restriction as well as refusing lasix (see MAR), and requesting to speak to doctor. Patient is A&Ox4. MD paged and made aware. 1800: Unable to get BP on patient as patient has taken cuff off again and is uncooperative. PRN medication requested. Attempted to pull, however omnicell is empty. Called pharmacy and requested refill. Patient updated.

## 2023-02-22 NOTE — PROGRESS NOTES
Patients case reviewed during interdisciplinary team meeting in ICU/CCU. Rev.  Finesse Stevens 70, [address]

## 2023-02-22 NOTE — ROUTINE PROCESS
TRANSFER - OUT REPORT:    Verbal report given to BETH Akbar on Clay Hardy  being transferred to  for routine progression of care       Report consisted of patients Situation, Background, Assessment and   Recommendations(SBAR). Information from the following report(s) SBAR and ED Summary was reviewed with the receiving nurse. Lines:   Peripheral IV 02/21/23 Posterior;Right Hand (Active)       Peripheral IV 02/21/23 Left Arm (Active)   Site Assessment Clean, dry, & intact 02/21/23 1930   Dressing Status Clean, dry, & intact 02/21/23 1930   Dressing Type Transparent;Tape 02/21/23 1930   Hub Color/Line Status Pink;Patent; Flushed 02/21/23 1930   Action Taken Blood drawn 02/21/23 1930        Opportunity for questions and clarification was provided.       Patient transported with:   Monitor  Registered Nurse  Tech

## 2023-02-22 NOTE — ED NOTES
Assumed care of patient. A&Ox4; no acute distress; no respiratory distress. Patient with blood currently being administered. Assessed IV site, noted infiltration. Immediately stopped transfusion. Will insert new IV.

## 2023-02-22 NOTE — PROGRESS NOTES
Comprehensive Nutrition Assessment    Type and Reason for Visit: Initial, Positive nutrition screen (BMI <18)    Nutrition Recommendations/Plan:   Continue w/ current diet - cardiac, 2g Na  Continue to monitor and document intake, wts in I/Os     Malnutrition Assessment:  Malnutrition Status:  No malnutrition (02/22/23 8932)    Context:  Acute illness     Findings of the 6 clinical characteristics of malnutrition:   Energy Intake:  No significant decrease in energy intake  Weight Loss:  No significant weight loss     Body Fat Loss:  Unable to assess,     Muscle Mass Loss:  Unable to assess,    Fluid Accumulation:  Unable to assess,     Strength:  Not performed     Nutrition Assessment:    Admitted for severe anemia (H/H 2.0/8.2) presenting w/ SOB and chest pain. No hx of  blood disorder, bleeds. Received RBC transfusion. Poor po intake per emar, reported by pt and son. Pt seen distressed for RD review d/t dry mouth, expressed dislike of water, only wanted to drink orange juice, seen w/ 2 empty OJ cartons at bedside. Pt expressed intention for non-compliance w/ fluid restriction, requesting more OJ. Per pt, #, denies changes to wt, appetite/PO intake [id number]. Reports drinking 1/2-gal milk every 2 days, 32 oz orange juice daily, eating fried foods/fast food, 1-2 meals/day PTA. Denied alcohol consumption. Endorsed eating 100% of all bfast this am. Continue w/ current diet. Labs: H/H 5.8, 18.6, Na 137, K 3.6, Gluc 102, BUN 20, Cr 0.69, Ca 7.7, Alb 3.0. Meds: lasix. Nutrition Related Findings:    NFPE deferred d/t pt agitated. Appears without visual signs muscle/fat wasting. No n/v, c/d, nor issues chewing/swallowing. No edema noted. Wound Type: None    Current Nutrition Intake & Therapies:  Average Meal Intake: %  ADULT DIET Regular; Low Fat/Low Chol/High Fiber/2 gm Na;  Low Sodium (2 gm); 1000 ml    Anthropometric Measures:  Height: 5' 8\" (172.7 cm)  Ideal Body Weight (IBW): 154 lbs (70 kg) Current Body Wt:  54.4 kg (120 lb), 77.9 % IBW. Stated (by pt)  Current BMI (kg/m2): 18.3  Usual Body Weight: 54.4 kg (120 lb)  % Weight Change (Calculated): 0  Weight Adjustment: Paraplegia  Total Amputation Percentage: 7.5  Adjusted Ideal Body Weight (lbs) (Calculated): 142.5  Adjusted Ideal Body Weight (kg) (Calculated): 64.77 kg  Adjusted % IBW (Calculated): 84.2  Adjusted BMI (Calculated): 19.7  BMI Category: Normal weight (BMI 18.5-24.9) (w/ adj BMI for paraplegia)    Estimated Daily Nutrient Needs:  Energy Requirements Based On: Kcal/kg  Weight Used for Energy Requirements: Current  Energy (kcal/day): 3434-6726 kcal/d (30-35 kcal/d)     Protein (g/day): 55 g/day (1.0 g/kg)  Method Used for Fluid Requirements: Other (comment) (per MD rec d/t pt risk of fluid overload at this time)  Fluid (ml/day): 1000 ml    Nutrition Diagnosis:   Altered nutrition-related lab values related to other (specify) (unclear at this time) as evidenced by lab values (Hgb 2.0 (2/21), 5.8 (2/22)/Hct 8.2 (2/21), 18.6 (2/22))    Nutrition Interventions:   Food and/or Nutrient Delivery: Continue current diet  Nutrition Education/Counseling: No recommendations at this time  Coordination of Nutrition Care: Continue to monitor while inpatient  Plan of Care discussed with: Pt, Rn, Attending    Goals:     Goals: Meet at least 75% of estimated needs, by next RD assessment       Nutrition Monitoring and Evaluation:   Behavioral-Environmental Outcomes: None identified  Food/Nutrient Intake Outcomes: Food and nutrient intake  Physical Signs/Symptoms Outcomes: Biochemical data, Meal time behavior, Weight, Fluid status or edema    Discharge Planning:     Too soon to determine    Loan Alberto RD  Contact: Ext [phone] or via PS

## 2023-02-22 NOTE — PROGRESS NOTES
OT eval order received and acknowledged, attempted at 1329 however per medical chart, pt's HgB is 5.8 following blood transfusion. Will hold OOB mobility at this time. Will continue to follow pt and attempt OT eval at a later time as medically appropriate. Thank you.

## 2023-02-22 NOTE — PROGRESS NOTES
PT eval order received and acknowledged. PT eval attempted at 1130 however per IDR rounds and chart review pt current HGB 5.8 following blood transfusion, will hold OOB mobility at this time. Will continue to follow patient and attempt PT eval at a later time. Thank you.

## 2023-02-22 NOTE — PROGRESS NOTES
Hospitalist Progress Note               Daily Progress Note: 2/22/2023      Subjective:     Patient is found in bed he is lying facedown. He reports feeling this way because of back spasms. He also reports leg spasms. This is most likely related to his status as a lower extremity paraplegic. He reports to have not slept well last night if at all. He reports of only slept 4 hours in the last week. He claims to need pain medicine for the pain in his back. He does however report his shortness of breath is significantly improved. He reports to be hallucinating however does not endorse any specific auditory/visual hallucinations. Denies chest pain, denies fever, denies chills. He is hearing impaired. Hospital course to date:  Fei Jolley is a 60 y.o. male who presents with shortness of breath. He had past medical history of COPD and paraplegia. He reports to be experiencing worsening shortness of breath on exertion and must rest to regain his breath. He reports to be having difficult time forming sentences while short of breath. He reports chronic pain in his left axillae, left lower extremity and low back at the level of t10-l1 which apparently is the same level of his spinal damage. He is not very mobile and uses a wheel chair when not using leg bracing. He denies cough, chest pain, changes in bowel habits, changes in urinary frequency, no melena, or streaking in stool. He is passing flatus. 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 He was previously a 1ppd smoker for 20 years but quit 35 years ago. He does not drink alcohol. He does not use recreational drugs. He denies vaccinations and refuses offers for age-appropriate vaccinations (flu, covid, pneumococcal 13). His diet is poor per himself and his son. He does not have a PCP. He has history of amputations of the toes.            Problem List:  Problem List as of 2/22/2023 Never Reviewed            Codes Class Noted - Resolved    Severe anemia ICD-10-CM: D64.9  ICD-9-CM: 285.9  2023 - Present           Medications reviewed  Current Facility-Administered Medications   Medication Dose Route Frequency    oxyCODONE-acetaminophen (PERCOCET) 5-325 mg per tablet 1 Tablet  1 Tablet Oral Q6H PRN    0.9% sodium chloride infusion 250 mL  250 mL IntraVENous PRN    furosemide (LASIX) injection 40 mg  40 mg IntraVENous DAILY    sodium chloride (NS) flush 5-40 mL  5-40 mL IntraVENous Q8H    sodium chloride (NS) flush 5-40 mL  5-40 mL IntraVENous PRN    acetaminophen (TYLENOL) tablet 650 mg  650 mg Oral Q6H PRN    Or    acetaminophen (TYLENOL) suppository 650 mg  650 mg Rectal Q6H PRN    polyethylene glycol (MIRALAX) packet 17 g  17 g Oral DAILY PRN    promethazine (PHENERGAN) tablet 12.5 mg  12.5 mg Oral Q6H PRN    Or    ondansetron (ZOFRAN) injection 4 mg  4 mg IntraVENous Q6H PRN    melatonin tablet 6 mg  6 mg Oral QHS PRN    lidocaine 4 % patch 2 Patch  2 Patch TransDERmal Q12H PRN       Review of Systems:   Pertinent items are noted in HPI. Objective:   Physical Exam:     Visit Vitals  /82   Pulse 74   Temp 97.3 °F (36.3 °C)   Resp 20   Ht 5' 8\" (1.727 m)   Wt 54.4 kg (120 lb)   SpO2 98%   BMI 18.25 kg/m²      O2 Device: None (Room air)    Temp (24hrs), Av.1 °F (36.7 °C), Min:97.3 °F (36.3 °C), Max:99.2 °F (37.3 °C)    No intake/output data recorded.  1901 -  0700  In: 930.8   Out: 600 [Urine:600]    General:   Awake and alert   Lungs:   Clear to auscultation bilaterally. Chest wall:  No tenderness or deformity. Heart:  Regular rate and rhythm, S1, S2 normal, no murmur, click, rub or gallop. Abdomen:   Soft, non-tender. Bowel sounds normal. No masses,  No organomegaly. Extremities: Extremities normal, atraumatic, no cyanosis or edema. Pulses: 2+ and symmetric all extremities.    Skin: Skin color, texture, turgor normal. No rashes or lesions   Neurologic: CNII-XII intact. No gross focal deficits    No gross lymphadenopathy grossly         Data Review:       Recent Days:  Recent Labs     02/22/23  0630 02/21/23  1547   WBC 7.6 9.3   HGB 5.8* 2.0*   HCT 18.6* 8.2*    272     Recent Labs     02/22/23  0630 02/21/23  1309    136   K 3.6 4.2    108   CO2 24 17*   * 100   BUN 20 18   CREA 0.69* 0.78   CA 7.7* 8.7   PHOS 3.2  --    ALB 3.0* 3.5   TBILI  --  1.0   ALT  --  466*     No results for input(s): PH, PCO2, PO2, HCO3, FIO2 in the last 72 hours. 24 Hour Results:  Recent Results (from the past 24 hour(s))   TYPE & SCREEN    Collection Time: 02/21/23  1:08 PM   Result Value Ref Range    Crossmatch Expiration 02/24/2023,2359     ABO/Rh(D) A Positive     Antibody screen Negative     Unit number [id number]     Blood component type [facility]     Unit division 00     Status of unit [address] to transfuse     Crossmatch result Compatible     Unit number [id number]     Blood component type [facility]     Unit division 00     Status of unit Issued,final     TRANSFUSION STATUS Ok to transfuse     Crossmatch result Compatible     Unit number [id number]     Blood component type [facility]     Unit division 00     Status of unit Issued,final     TRANSFUSION STATUS Ok to transfuse     Crossmatch result Compatible    METABOLIC PANEL, COMPREHENSIVE    Collection Time: 02/21/23  1:09 PM   Result Value Ref Range    Sodium 136 136 - 145 mmol/L    Potassium 4.2 3.5 - 5.1 mmol/L    Chloride 108 97 - 108 mmol/L    CO2 17 (L) 21 - 32 mmol/L    Anion gap 11 5 - 15 mmol/L    Glucose 100 65 - 100 mg/dL    BUN 18 6 - 20 mg/dL    Creatinine 0.78 0.70 - 1.30 mg/dL    BUN/Creatinine ratio 23 (H) 12 - 20      eGFR >60 >60 ml/min/1.73m2    Calcium 8.7 8.5 - 10.1 mg/dL    Bilirubin, total 1.0 0.2 - 1.0 mg/dL    AST (SGOT) 277 (H) 15 - 37 U/L    ALT (SGPT) 466 (H) 12 - 78 U/L    Alk.  phosphatase 187 (H) 45 - 117 U/L    Protein, total 7.1 6.4 - 8.2 g/dL    Albumin 3.5 3.5 - 5.0 g/dL    Globulin 3.6 2.0 - 4.0 g/dL    A-G Ratio 1.0 (L) 1.1 - 2.2     TROPONIN-HIGH SENSITIVITY    Collection Time: 02/21/23  1:09 PM   Result Value Ref Range    Troponin-High Sensitivity 29 0 - 76 ng/L   D DIMER    Collection Time: 02/21/23  2:32 PM   Result Value Ref Range    D DIMER 1.84 (H) <0.50 ug/ml(FEU)   COVID-19 RAPID TEST    Collection Time: 02/21/23  2:32 PM   Result Value Ref Range    COVID-19 rapid test Not Detected Not Detected     INFLUENZA A & B AG (RAPID TEST)    Collection Time: 02/21/23  2:32 PM   Result Value Ref Range    Influenza A Antigen Negative Negative      Influenza B Antigen Negative Negative     NT-PRO BNP    Collection Time: 02/21/23  2:32 PM   Result Value Ref Range    NT pro-BNP 1,621 (H) <125 pg/mL   LD    Collection Time: 02/21/23  2:32 PM   Result Value Ref Range     (H) 85 - 241 U/L   CBC WITH AUTOMATED DIFF    Collection Time: 02/21/23  3:47 PM   Result Value Ref Range    WBC 9.3 4.1 - 11.1 K/uL    RBC 1.43 (L) 4.10 - 5.70 M/uL    HGB 2.0 (LL) 12.1 - 17.0 g/dL    HCT 8.2 (LL) 36.6 - 50.3 %    MCV 57.3 (L) 80.0 - 99.0 FL    MCH 14.0 (L) 26.0 - 34.0 PG    MCHC 24.4 (L) 30.0 - 36.5 g/dL    RDW 22.4 (H) 11.5 - 14.5 %    PLATELET 978 457 - 500 K/uL    MPV 8.8 (L) 8.9 - 12.9 FL    NRBC 2.9 (H) 0.0  WBC    ABSOLUTE NRBC 0.27 (H) 0.00 - 0.01 K/uL    NEUTROPHILS 74 32 - 75 %    LYMPHOCYTES 12 12 - 49 %    MONOCYTES 13 5 - 13 %    EOSINOPHILS 0 0 - 7 %    BASOPHILS 0 0 - 1 %    IMMATURE GRANULOCYTES 1 (H) 0 - 0.5 %    ABS. NEUTROPHILS 6.9 1.8 - 8.0 K/UL    ABS. LYMPHOCYTES 1.1 0.8 - 3.5 K/UL    ABS. MONOCYTES 1.2 (H) 0.0 - 1.0 K/UL    ABS. EOSINOPHILS 0.0 0.0 - 0.4 K/UL    ABS. BASOPHILS 0.0 0.0 - 0.1 K/UL    ABS. IMM.  GRANS. 0.1 (H) 0.00 - 0.04 K/UL    DF Smear Scanned      RBC COMMENTS Anisocytosis  2+        RBC COMMENTS Hypochromia  4+        RBC COMMENTS Microcytosis  3+        RBC COMMENTS Polychromasia  1+        RBC COMMENTS Schistocytes  Present TSH 3RD GENERATION    Collection Time: 02/21/23  7:29 PM   Result Value Ref Range    TSH 1.77 0.36 - 3.74 uIU/mL   URINALYSIS W/ REFLEX CULTURE    Collection Time: 02/21/23  9:01 PM    Specimen: Urine   Result Value Ref Range    Color Yellow/Straw      Appearance Turbid (A) Clear      Specific gravity 1.027 1.003 - 1.030      pH (UA) 5.0 5.0 - 8.0      Protein 30 (A) Negative mg/dL    Glucose Negative Negative mg/dL    Ketone Negative Negative mg/dL    Bilirubin Negative Negative      Blood Negative Negative      Urobilinogen 0.1 0.1 - 1.0 EU/dL    Nitrites Negative Negative      Leukocyte Esterase Trace (A) Negative      WBC 10-20 0 - 4 /hpf    RBC 0-5 0 - 5 /hpf    Bacteria Negative Negative /hpf    UA:UC IF INDICATED Urine Culture Ordered (A) Culture not indicated by UA result     CBC WITH AUTOMATED DIFF    Collection Time: 02/22/23  6:30 AM   Result Value Ref Range    WBC 7.6 4.1 - 11.1 K/uL    RBC 2.53 (L) 4.10 - 5.70 M/uL    HGB 5.8 (LL) 12.1 - 17.0 g/dL    HCT 18.6 (L) 36.6 - 50.3 %    MCV 73.5 (L) 80.0 - 99.0 FL    MCH 22.9 (L) 26.0 - 34.0 PG    MCHC 31.2 30.0 - 36.5 g/dL    PLATELET 005 377 - 025 K/uL    MPV 9.5 8.9 - 12.9 FL    NRBC 2.1 (H) 0.0  WBC    ABSOLUTE NRBC 0.16 (H) 0.00 - 0.01 K/uL    NEUTROPHILS 66 32 - 75 %    LYMPHOCYTES 14 12 - 49 %    MONOCYTES 13 5 - 13 %    EOSINOPHILS 5 0 - 7 %    BASOPHILS 1 0 - 1 %    IMMATURE GRANULOCYTES 1 (H) 0 - 0.5 %    ABS. NEUTROPHILS 5.0 1.8 - 8.0 K/UL    ABS. LYMPHOCYTES 1.1 0.8 - 3.5 K/UL    ABS. MONOCYTES 1.0 0.0 - 1.0 K/UL    ABS. EOSINOPHILS 0.4 0.0 - 0.4 K/UL    ABS. BASOPHILS 0.0 0.0 - 0.1 K/UL    ABS. IMM.  GRANS. 0.1 (H) 0.00 - 0.04 K/UL    DF AUTOMATED     RENAL FUNCTION PANEL    Collection Time: 02/22/23  6:30 AM   Result Value Ref Range    Sodium 137 136 - 145 mmol/L    Potassium 3.6 3.5 - 5.1 mmol/L    Chloride 107 97 - 108 mmol/L    CO2 24 21 - 32 mmol/L    Anion gap 6 5 - 15 mmol/L    Glucose 102 (H) 65 - 100 mg/dL    BUN 20 6 - 20 mg/dL Creatinine 0.69 (L) 0.70 - 1.30 mg/dL    BUN/Creatinine ratio 29 (H) 12 - 20      eGFR >60 >60 ml/min/1.73m2    Calcium 7.7 (L) 8.5 - 10.1 mg/dL    Phosphorus 3.2 2.6 - 4.7 mg/dL    Albumin 3.0 (L) 3.5 - 5.0 g/dL       CTA CHEST W OR W WO CONT   Final Result      1. No evidence of pulmonary embolism. 2. Mild pulmonary edema in the lower lobes. Trace right pleural effusion. Trace   perihepatic ascites. 3. Cardiomegaly. Enlarged pulmonary arteries, consistent with pulmonary arterial   hypertension. 4. Additional incidental findings as above. XR CHEST SNGL V   Final Result   Central pulmonary vascular congestion versus perihilar opacities   that can be seen with a viral process. Assessment:    Severe anemia of chronic disease  Acute CHF, high-output secondary to severe anemia  Pulmonary arterial hypertension  Also hepatitis  Acute exacerbation of COPD  Protein calorie malnutrition  Acute on chronic anemia  Paraparesis lower extremities  Bilateral foot drop  Indwelling Hardy catheter  High lumbar low thoracic spinal fusion        Discussion/MDM: Patient with multiple medical comorbidities, each with high likelihood for morbidity and mortality if left untreated. Patient being treated with medication that requires intensive monitoring due to increased risk for toxicity. I have reviewed patient's presenting subjective and objective findings, as well as all laboratory studies, imaging studies, and vital signs to date as well as treatment rendered and patient's response to those treatments. In addition, prior medical, surgical and relevant social and family histories were reviewed.         Plan:  Patient status posttransfusion-he appears well labs show patient might benefit from another packed red blood cell fusion  Continue furosemide  Ketorolac  Decadron 4 twice daily  Pending vitamin B-12/vitamin B 9 labs-outcome will determine next steps  Pending outcomes Kappa lambda free light chains  Pending ferritin serum  TSH levels noncontributory to anemia      Care Plan discussed with: Patient/Family    Code status:    Social determinants of health:    Disposition:     Total time spent with patient: 30 minutes. Agustina Wright    *ATTENTION:  This note has been created by a medical student for educational purposes only. Please do not refer to the content of this note for clinical decision-making, billing, or other purposes. Please see attending physicians note to obtain clinical information on this patient. *

## 2023-02-22 NOTE — PROGRESS NOTES
Hematology and Oncology Progress Note    Patient: Jersey Kumar MRN: [id number]  SSN: [id number]    YOB: 1960  Age: 60 y.o. Sex: male      Admit Date: 2/21/2023    LOS: 1 day     Chief Complaint: Patient was admitted with increasing shortness of breath and severe fatigue    Subjective:   Patient is in ICU. He is somewhat agitated and restless. He went on for 15 minutes about his food and orange juice. He is physically feeling better since he got transfusion. He denies any blood in stool or urine. Objective:     Vitals:    02/22/23 0730 02/22/23 0745 02/22/23 0800 02/22/23 0815   BP: 102/61 118/82 102/65 115/82   Pulse: 77 65 69 74   Resp: 20 19 20 20   Temp:       SpO2:   98%    Weight:       Height:          Physical Exam:  Constitutional: [de-identified] white male looks older for his age. Not in any acute distress or pain. His complexion is significantly pale. Eyes: Sclerae anicteric. Conjunctivae shows severe pallor. ENMT: Oral mucosa is moist, no thrush, mucositis, or petechiae. Neck: No adenopathy   Respiratory: Lungs are clear bilaterally. Cardiovascular: Sinus tachycardia with holosystolic murmur. Abdomen: Soft, nontender, no hepatosplenomegaly. No guarding or rigidity. Bowel sounds present. Extremities: No edema. Skin: No petechiae; no skin rash. Neurologic: Alert/oriented x 3. Has lower extremity weakness.     Lab/Data Review:    Recent Results (from the past 24 hour(s))   TYPE & SCREEN    Collection Time: 02/21/23  1:08 PM   Result Value Ref Range    Crossmatch Expiration 02/24/2023,2359     ABO/Rh(D) A Positive     Antibody screen Negative     Unit number [id number]     Blood component type [facility]     Unit division 00     Status of unit [address] to transfuse     Crossmatch result Compatible     Unit number [id number]     Blood component type [facility]     Unit division 00     Status of unit [address] to transfuse Crossmatch result Compatible     Unit number [id number]     Blood component type [facility]     Unit division 00     Status of unit Issued,final     TRANSFUSION STATUS Ok to transfuse     Crossmatch result Compatible    METABOLIC PANEL, COMPREHENSIVE    Collection Time: 02/21/23  1:09 PM   Result Value Ref Range    Sodium 136 136 - 145 mmol/L    Potassium 4.2 3.5 - 5.1 mmol/L    Chloride 108 97 - 108 mmol/L    CO2 17 (L) 21 - 32 mmol/L    Anion gap 11 5 - 15 mmol/L    Glucose 100 65 - 100 mg/dL    BUN 18 6 - 20 mg/dL    Creatinine 0.78 0.70 - 1.30 mg/dL    BUN/Creatinine ratio 23 (H) 12 - 20      eGFR >60 >60 ml/min/1.73m2    Calcium 8.7 8.5 - 10.1 mg/dL    Bilirubin, total 1.0 0.2 - 1.0 mg/dL    AST (SGOT) 277 (H) 15 - 37 U/L    ALT (SGPT) 466 (H) 12 - 78 U/L    Alk.  phosphatase 187 (H) 45 - 117 U/L    Protein, total 7.1 6.4 - 8.2 g/dL    Albumin 3.5 3.5 - 5.0 g/dL    Globulin 3.6 2.0 - 4.0 g/dL    A-G Ratio 1.0 (L) 1.1 - 2.2     TROPONIN-HIGH SENSITIVITY    Collection Time: 02/21/23  1:09 PM   Result Value Ref Range    Troponin-High Sensitivity 29 0 - 76 ng/L   D DIMER    Collection Time: 02/21/23  2:32 PM   Result Value Ref Range    D DIMER 1.84 (H) <0.50 ug/ml(FEU)   COVID-19 RAPID TEST    Collection Time: 02/21/23  2:32 PM   Result Value Ref Range    COVID-19 rapid test Not Detected Not Detected     INFLUENZA A & B AG (RAPID TEST)    Collection Time: 02/21/23  2:32 PM   Result Value Ref Range    Influenza A Antigen Negative Negative      Influenza B Antigen Negative Negative     NT-PRO BNP    Collection Time: 02/21/23  2:32 PM   Result Value Ref Range    NT pro-BNP 1,621 (H) <125 pg/mL   LD    Collection Time: 02/21/23  2:32 PM   Result Value Ref Range     (H) 85 - 241 U/L   CBC WITH AUTOMATED DIFF    Collection Time: 02/21/23  3:47 PM   Result Value Ref Range    WBC 9.3 4.1 - 11.1 K/uL    RBC 1.43 (L) 4.10 - 5.70 M/uL    HGB 2.0 (LL) 12.1 - 17.0 g/dL    HCT 8.2 (LL) 36.6 - 50.3 %    MCV 57.3 (L) 80.0 - 99.0 FL    MCH 14.0 (L) 26.0 - 34.0 PG    MCHC 24.4 (L) 30.0 - 36.5 g/dL    RDW 22.4 (H) 11.5 - 14.5 %    PLATELET 334 268 - 017 K/uL    MPV 8.8 (L) 8.9 - 12.9 FL    NRBC 2.9 (H) 0.0  WBC    ABSOLUTE NRBC 0.27 (H) 0.00 - 0.01 K/uL    NEUTROPHILS 74 32 - 75 %    LYMPHOCYTES 12 12 - 49 %    MONOCYTES 13 5 - 13 %    EOSINOPHILS 0 0 - 7 %    BASOPHILS 0 0 - 1 %    IMMATURE GRANULOCYTES 1 (H) 0 - 0.5 %    ABS. NEUTROPHILS 6.9 1.8 - 8.0 K/UL    ABS. LYMPHOCYTES 1.1 0.8 - 3.5 K/UL    ABS. MONOCYTES 1.2 (H) 0.0 - 1.0 K/UL    ABS. EOSINOPHILS 0.0 0.0 - 0.4 K/UL    ABS. BASOPHILS 0.0 0.0 - 0.1 K/UL    ABS. IMM.  GRANS. 0.1 (H) 0.00 - 0.04 K/UL    DF Smear Scanned      RBC COMMENTS Anisocytosis  2+        RBC COMMENTS Hypochromia  4+        RBC COMMENTS Microcytosis  3+        RBC COMMENTS Polychromasia  1+        RBC COMMENTS Schistocytes  Present       TSH 3RD GENERATION    Collection Time: 02/21/23  7:29 PM   Result Value Ref Range    TSH 1.77 0.36 - 3.74 uIU/mL   URINALYSIS W/ REFLEX CULTURE    Collection Time: 02/21/23  9:01 PM    Specimen: Urine   Result Value Ref Range    Color Yellow/Straw      Appearance Turbid (A) Clear      Specific gravity 1.027 1.003 - 1.030      pH (UA) 5.0 5.0 - 8.0      Protein 30 (A) Negative mg/dL    Glucose Negative Negative mg/dL    Ketone Negative Negative mg/dL    Bilirubin Negative Negative      Blood Negative Negative      Urobilinogen 0.1 0.1 - 1.0 EU/dL    Nitrites Negative Negative      Leukocyte Esterase Trace (A) Negative      WBC 10-20 0 - 4 /hpf    RBC 0-5 0 - 5 /hpf    Bacteria Negative Negative /hpf    UA:UC IF INDICATED Urine Culture Ordered (A) Culture not indicated by UA result     CBC WITH AUTOMATED DIFF    Collection Time: 02/22/23  6:30 AM   Result Value Ref Range    WBC 7.6 4.1 - 11.1 K/uL    RBC 2.53 (L) 4.10 - 5.70 M/uL    HGB 5.8 (LL) 12.1 - 17.0 g/dL    HCT 18.6 (L) 36.6 - 50.3 %    MCV 73.5 (L) 80.0 - 99.0 FL    MCH 22.9 (L) 26.0 - 34.0 PG    MCHC 31.2 30.0 - 36.5 g/dL    PLATELET 653 570 - 424 K/uL    MPV 9.5 8.9 - 12.9 FL    NRBC 2.1 (H) 0.0  WBC    ABSOLUTE NRBC 0.16 (H) 0.00 - 0.01 K/uL    NEUTROPHILS 66 32 - 75 %    LYMPHOCYTES 14 12 - 49 %    MONOCYTES 13 5 - 13 %    EOSINOPHILS 5 0 - 7 %    BASOPHILS 1 0 - 1 %    IMMATURE GRANULOCYTES 1 (H) 0 - 0.5 %    ABS. NEUTROPHILS 5.0 1.8 - 8.0 K/UL    ABS. LYMPHOCYTES 1.1 0.8 - 3.5 K/UL    ABS. MONOCYTES 1.0 0.0 - 1.0 K/UL    ABS. EOSINOPHILS 0.4 0.0 - 0.4 K/UL    ABS. BASOPHILS 0.0 0.0 - 0.1 K/UL    ABS. IMM. GRANS. 0.1 (H) 0.00 - 0.04 K/UL    DF AUTOMATED     RENAL FUNCTION PANEL    Collection Time: 02/22/23  6:30 AM   Result Value Ref Range    Sodium 137 136 - 145 mmol/L    Potassium 3.6 3.5 - 5.1 mmol/L    Chloride 107 97 - 108 mmol/L    CO2 24 21 - 32 mmol/L    Anion gap 6 5 - 15 mmol/L    Glucose 102 (H) 65 - 100 mg/dL    BUN 20 6 - 20 mg/dL    Creatinine 0.69 (L) 0.70 - 1.30 mg/dL    BUN/Creatinine ratio 29 (H) 12 - 20      eGFR >60 >60 ml/min/1.73m2    Calcium 7.7 (L) 8.5 - 10.1 mg/dL    Phosphorus 3.2 2.6 - 4.7 mg/dL    Albumin 3.0 (L) 3.5 - 5.0 g/dL           Assessment and plan:     79-year-old white male with history of COPD and paraplegia came with shortness of breath and was found to have symptomatic anemia. Patient has no visible bleeding and has no history of previous anemia. Just based on his overall symptomatology I think his anemia must be chronic and must have developed over a long time. He does have anemia symptoms but not as bad as his numbers look. 1) anemia: I definitely agree with packed RBC transfusion. We have started anemia work-up and based on the work-up results we will make further recommendations.  -His peripheral smear on 21 February shows significant hypochromia and microcytosis, occasional nucleated RBCs, schistocytes, fragmented RBCs, occasional teardrop RBCs. There is significant anisocytosis and poikilocytosis is seen. There are no blast cells are seen. 12/21:   I had a very long discussion with patient and his son and daughter regarding his severe anemia and my current impression. We also discussed about further diagnostic plan and explained to them that if noninvasive work-up does not reveal the cause of his anemia then we may even consider doing bone marrow biopsy and aspiration.  -While we are giving him packed RBC transfusion we will have to be careful with fluid overload. Patient may also develop delusional thrombocytopenia because of significant packed RBC transfusion. 12/22: Patient had packed RBC transfusion and his hemoglobin went up to 5.8. I will hold off any further transfusion today. We will repeat CBC again tomorrow and decide whether he needs any further transfusion. Patient's iron studies shows iron of 11, TIBC 461, iron saturation 2% and ferritin of 4. Patient's vitamin B12 is 1093 and folate is 15.6. These numbers are consistent with iron deficiency anemia. I have started patient on ferrous sulfate and I will get GI evaluation for possible GI blood loss. Case was discussed with attending physician. Case was also discussed with patient's nurse. Explained to patient about his diagnosis and work-up plan. Also did some reassurance and counseling. This dictation was done by dragon, computer voice recognition software. Often unanticipated grammatical, syntax, phones and other interpretive errors are inadvertently transcribed. Please excuse errors that have escaped final proofreading.        Signed By: Shannon Reynoso MD     February 22, 2023

## 2023-02-22 NOTE — PROGRESS NOTES
Hospitalist Progress Note               Daily Progress Note: 2/22/2023      Subjective:     Patient is found in bed he is lying facedown. He reports feeling this way because of back spasms. He also reports leg spasms. This is most likely related to his status as a lower extremity paraplegic. He reports to have not slept well last night if at all. He reports of only slept 4 hours in the last week. He claims to need pain medicine for the pain in his back. He does however report his shortness of breath is significantly improved. He reports to be hallucinating however does not endorse any specific auditory/visual hallucinations. Denies chest pain, denies fever, denies chills. He is hearing impaired. 3 units of PRBC transfused. Received lasix in interim    Hospital course to date:  Sami Guallpa is a 60 y.o. male who presents with shortness of breath. He had past medical history of COPD and paraplegia. He reports to be experiencing worsening shortness of breath on exertion and must rest to regain his breath. He reports to be having difficult time forming sentences while short of breath. He reports chronic pain in his left axillae, left lower extremity and low back at the level of t10-l1 which apparently is the same level of his spinal damage. He is not very mobile and uses a wheel chair when not using leg bracing. He denies cough, chest pain, changes in bowel habits, changes in urinary frequency, no melena, or streaking in stool. He is passing flatus. 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 He was previously a 1ppd smoker for 20 years but quit 35 years ago. He does not drink alcohol. He does not use recreational drugs. He denies vaccinations and refuses offers for age-appropriate vaccinations (flu, covid, pneumococcal 13). His diet is poor per himself and his son. He does not have a PCP. He has history of amputations of the toes. Problem List:  Problem List as of 2023 Never Reviewed            Codes Class Noted - Resolved    Severe anemia ICD-10-CM: D64.9  ICD-9-CM: 285.9  2023 - Present           Medications reviewed  Current Facility-Administered Medications   Medication Dose Route Frequency    oxyCODONE-acetaminophen (PERCOCET) 5-325 mg per tablet 1 Tablet  1 Tablet Oral Q6H PRN    0.9% sodium chloride infusion 250 mL  250 mL IntraVENous PRN    furosemide (LASIX) injection 40 mg  40 mg IntraVENous DAILY    sodium chloride (NS) flush 5-40 mL  5-40 mL IntraVENous Q8H    sodium chloride (NS) flush 5-40 mL  5-40 mL IntraVENous PRN    acetaminophen (TYLENOL) tablet 650 mg  650 mg Oral Q6H PRN    Or    acetaminophen (TYLENOL) suppository 650 mg  650 mg Rectal Q6H PRN    polyethylene glycol (MIRALAX) packet 17 g  17 g Oral DAILY PRN    promethazine (PHENERGAN) tablet 12.5 mg  12.5 mg Oral Q6H PRN    Or    ondansetron (ZOFRAN) injection 4 mg  4 mg IntraVENous Q6H PRN    melatonin tablet 6 mg  6 mg Oral QHS PRN    lidocaine 4 % patch 2 Patch  2 Patch TransDERmal Q12H PRN       Review of Systems:   Pertinent items are noted in HPI. Objective:   Physical Exam:     Visit Vitals  /75   Pulse 82   Temp 97.3 °F (36.3 °C)   Resp 26   Ht 5' 8\" (1.727 m)   Wt 54.4 kg (120 lb)   SpO2 98%   BMI 18.25 kg/m²      O2 Device: None (Room air)    Temp (24hrs), Av.1 °F (36.7 °C), Min:97.3 °F (36.3 °C), Max:99.2 °F (37.3 °C)    No intake/output data recorded.  1901 -  0700  In: 930.8   Out: 600 [Urine:600]    General:   Awake and alert   Lungs:   Clear to auscultation bilaterally. Chest wall:  No tenderness or deformity. Heart:  Regular rate and rhythm, S1, S2 normal, no murmur, click, rub or gallop. Abdomen:   Soft, non-tender. Bowel sounds normal. No masses,  No organomegaly. Extremities: Extremities normal, atraumatic, no cyanosis or edema. Pulses: 2+ and symmetric all extremities.    Skin: Skin color, texture, turgor normal. No rashes or lesions   Neurologic: CNII-XII intact. No gross focal deficits    No gross lymphadenopathy grossly         Data Review:       Recent Days:  Recent Labs     02/22/23  0630 02/21/23  1547   WBC 7.6 9.3   HGB 5.8* 2.0*   HCT 18.6* 8.2*    272     Recent Labs     02/22/23  0630 02/21/23  1309    136   K 3.6 4.2    108   CO2 24 17*   * 100   BUN 20 18   CREA 0.69* 0.78   CA 7.7* 8.7   PHOS 3.2  --    ALB 3.0* 3.5   TBILI  --  1.0   ALT  --  466*     No results for input(s): PH, PCO2, PO2, HCO3, FIO2 in the last 72 hours.     24 Hour Results:  Recent Results (from the past 24 hour(s))   EKG, 12 LEAD, INITIAL    Collection Time: 02/21/23  1:04 PM   Result Value Ref Range    Ventricular Rate 110 BPM    Atrial Rate 110 BPM    P-R Interval 94 ms    QRS Duration 84 ms    Q-T Interval 334 ms    QTC Calculation (Bezet) 452 ms    Calculated P Axis 57 degrees    Calculated R Axis 17 degrees    Calculated T Axis 55 degrees    Diagnosis       Sinus tachycardia with short MA  Nonspecific T wave abnormality  Abnormal ECG  No previous ECGs available  Confirmed by Yamileth ARTHUR MD ([id number]) on 2/22/2023 10:24:10 AM     TYPE & SCREEN    Collection Time: 02/21/23  1:08 PM   Result Value Ref Range    Crossmatch Expiration 02/24/2023,2359     ABO/Rh(D) A Positive     Antibody screen Negative     Unit number [id number]     Blood component type [facility]     Unit division 00     Status of unit Issued     [address] to transfuse     Crossmatch result Compatible     Unit number [id number]     Blood component type [facility]     Unit division 00     Status of unit [address] to transfuse     Crossmatch result Compatible     Unit number [id number]     Blood component type [facility]     Unit division 00     Status of unit Issued,final     TRANSFUSION STATUS Ok to transfuse     Crossmatch result Compatible    METABOLIC PANEL, COMPREHENSIVE    Collection Time: 02/21/23  1:09 PM   Result Value Ref Range    Sodium 136 136 - 145 mmol/L    Potassium 4.2 3.5 - 5.1 mmol/L    Chloride 108 97 - 108 mmol/L    CO2 17 (L) 21 - 32 mmol/L    Anion gap 11 5 - 15 mmol/L    Glucose 100 65 - 100 mg/dL    BUN 18 6 - 20 mg/dL    Creatinine 0.78 0.70 - 1.30 mg/dL    BUN/Creatinine ratio 23 (H) 12 - 20      eGFR >60 >60 ml/min/1.73m2    Calcium 8.7 8.5 - 10.1 mg/dL    Bilirubin, total 1.0 0.2 - 1.0 mg/dL    AST (SGOT) 277 (H) 15 - 37 U/L    ALT (SGPT) 466 (H) 12 - 78 U/L    Alk.  phosphatase 187 (H) 45 - 117 U/L    Protein, total 7.1 6.4 - 8.2 g/dL    Albumin 3.5 3.5 - 5.0 g/dL    Globulin 3.6 2.0 - 4.0 g/dL    A-G Ratio 1.0 (L) 1.1 - 2.2     TROPONIN-HIGH SENSITIVITY    Collection Time: 02/21/23  1:09 PM   Result Value Ref Range    Troponin-High Sensitivity 29 0 - 76 ng/L   D DIMER    Collection Time: 02/21/23  2:32 PM   Result Value Ref Range    D DIMER 1.84 (H) <0.50 ug/ml(FEU)   COVID-19 RAPID TEST    Collection Time: 02/21/23  2:32 PM   Result Value Ref Range    COVID-19 rapid test Not Detected Not Detected     INFLUENZA A & B AG (RAPID TEST)    Collection Time: 02/21/23  2:32 PM   Result Value Ref Range    Influenza A Antigen Negative Negative      Influenza B Antigen Negative Negative     NT-PRO BNP    Collection Time: 02/21/23  2:32 PM   Result Value Ref Range    NT pro-BNP 1,621 (H) <125 pg/mL   LD    Collection Time: 02/21/23  2:32 PM   Result Value Ref Range     (H) 85 - 241 U/L   CBC WITH AUTOMATED DIFF    Collection Time: 02/21/23  3:47 PM   Result Value Ref Range    WBC 9.3 4.1 - 11.1 K/uL    RBC 1.43 (L) 4.10 - 5.70 M/uL    HGB 2.0 (LL) 12.1 - 17.0 g/dL    HCT 8.2 (LL) 36.6 - 50.3 %    MCV 57.3 (L) 80.0 - 99.0 FL    MCH 14.0 (L) 26.0 - 34.0 PG    MCHC 24.4 (L) 30.0 - 36.5 g/dL    RDW 22.4 (H) 11.5 - 14.5 %    PLATELET 948 994 - 955 K/uL    MPV 8.8 (L) 8.9 - 12.9 FL    NRBC 2.9 (H) 0.0  WBC    ABSOLUTE NRBC 0.27 (H) 0.00 - 0.01 K/uL    NEUTROPHILS 74 32 - 75 % LYMPHOCYTES 12 12 - 49 %    MONOCYTES 13 5 - 13 %    EOSINOPHILS 0 0 - 7 %    BASOPHILS 0 0 - 1 %    IMMATURE GRANULOCYTES 1 (H) 0 - 0.5 %    ABS. NEUTROPHILS 6.9 1.8 - 8.0 K/UL    ABS. LYMPHOCYTES 1.1 0.8 - 3.5 K/UL    ABS. MONOCYTES 1.2 (H) 0.0 - 1.0 K/UL    ABS. EOSINOPHILS 0.0 0.0 - 0.4 K/UL    ABS. BASOPHILS 0.0 0.0 - 0.1 K/UL    ABS. IMM.  GRANS. 0.1 (H) 0.00 - 0.04 K/UL    DF Smear Scanned      RBC COMMENTS Anisocytosis  2+        RBC COMMENTS Hypochromia  4+        RBC COMMENTS Microcytosis  3+        RBC COMMENTS Polychromasia  1+        RBC COMMENTS Schistocytes  Present       TSH 3RD GENERATION    Collection Time: 02/21/23  7:29 PM   Result Value Ref Range    TSH 1.77 0.36 - 3.74 uIU/mL   URINALYSIS W/ REFLEX CULTURE    Collection Time: 02/21/23  9:01 PM    Specimen: Urine   Result Value Ref Range    Color Yellow/Straw      Appearance Turbid (A) Clear      Specific gravity 1.027 1.003 - 1.030      pH (UA) 5.0 5.0 - 8.0      Protein 30 (A) Negative mg/dL    Glucose Negative Negative mg/dL    Ketone Negative Negative mg/dL    Bilirubin Negative Negative      Blood Negative Negative      Urobilinogen 0.1 0.1 - 1.0 EU/dL    Nitrites Negative Negative      Leukocyte Esterase Trace (A) Negative      WBC 10-20 0 - 4 /hpf    RBC 0-5 0 - 5 /hpf    Bacteria Negative Negative /hpf    UA:UC IF INDICATED Urine Culture Ordered (A) Culture not indicated by UA result     CBC WITH AUTOMATED DIFF    Collection Time: 02/22/23  6:30 AM   Result Value Ref Range    WBC 7.6 4.1 - 11.1 K/uL    RBC 2.53 (L) 4.10 - 5.70 M/uL    HGB 5.8 (LL) 12.1 - 17.0 g/dL    HCT 18.6 (L) 36.6 - 50.3 %    MCV 73.5 (L) 80.0 - 99.0 FL    MCH 22.9 (L) 26.0 - 34.0 PG    MCHC 31.2 30.0 - 36.5 g/dL    PLATELET 491 425 - 539 K/uL    MPV 9.5 8.9 - 12.9 FL    NRBC 2.1 (H) 0.0  WBC    ABSOLUTE NRBC 0.16 (H) 0.00 - 0.01 K/uL    NEUTROPHILS 66 32 - 75 %    LYMPHOCYTES 14 12 - 49 %    MONOCYTES 13 5 - 13 %    EOSINOPHILS 5 0 - 7 %    BASOPHILS 1 0 - 1 % IMMATURE GRANULOCYTES 1 (H) 0 - 0.5 %    ABS. NEUTROPHILS 5.0 1.8 - 8.0 K/UL    ABS. LYMPHOCYTES 1.1 0.8 - 3.5 K/UL    ABS. MONOCYTES 1.0 0.0 - 1.0 K/UL    ABS. EOSINOPHILS 0.4 0.0 - 0.4 K/UL    ABS. BASOPHILS 0.0 0.0 - 0.1 K/UL    ABS. IMM. GRANS. 0.1 (H) 0.00 - 0.04 K/UL    DF AUTOMATED     RENAL FUNCTION PANEL    Collection Time: 02/22/23  6:30 AM   Result Value Ref Range    Sodium 137 136 - 145 mmol/L    Potassium 3.6 3.5 - 5.1 mmol/L    Chloride 107 97 - 108 mmol/L    CO2 24 21 - 32 mmol/L    Anion gap 6 5 - 15 mmol/L    Glucose 102 (H) 65 - 100 mg/dL    BUN 20 6 - 20 mg/dL    Creatinine 0.69 (L) 0.70 - 1.30 mg/dL    BUN/Creatinine ratio 29 (H) 12 - 20      eGFR >60 >60 ml/min/1.73m2    Calcium 7.7 (L) 8.5 - 10.1 mg/dL    Phosphorus 3.2 2.6 - 4.7 mg/dL    Albumin 3.0 (L) 3.5 - 5.0 g/dL       CTA CHEST W OR W WO CONT   Final Result      1. No evidence of pulmonary embolism. 2. Mild pulmonary edema in the lower lobes. Trace right pleural effusion. Trace   perihepatic ascites. 3. Cardiomegaly. Enlarged pulmonary arteries, consistent with pulmonary arterial   hypertension. 4. Additional incidental findings as above. XR CHEST SNGL V   Final Result   Central pulmonary vascular congestion versus perihilar opacities   that can be seen with a viral process. Assessment:    Severe anemia of chronic disease  Acute CHF, high-output secondary to severe anemia  Pulmonary arterial hypertension  Also hepatitis  Acute exacerbation of COPD  Protein calorie malnutrition  Acute on chronic anemia  Paraparesis lower extremities  Bilateral foot drop  Indwelling Hardy catheter  High lumbar low thoracic spinal fusion        Discussion/MDM: Patient with multiple medical comorbidities, each with high likelihood for morbidity and mortality if left untreated. Patient being treated with medication that requires intensive monitoring due to increased risk for toxicity.  I have reviewed patient's presenting subjective and objective findings, as well as all laboratory studies, imaging studies, and vital signs to date as well as treatment rendered and patient's response to those treatments. In addition, prior medical, surgical and relevant social and family histories were reviewed. Plan:  Patient status posttransfusion  Hydrocodone prescribed. Continue furosemide  Appreciate hematology consult. BM biopsy may be needed. Await Follow up  Patient does straight cath and will offer self straigh cath to him  Also noted to be very restless. Reported hallucinations as well. Will get pscyhiatry input. TSH level is normal.    Care Plan discussed with: Patient/Family    Code status:    Social determinants of health:    Disposition:     Total time spent with patient: 35 minutes. German Matthews MD    I agree with clinical findings, examination, assessment and plan of medical student. who was supervised by me during this assessment DISPLAY PLAN FREE TEXT

## 2023-02-23 LAB
HCT VFR BLD AUTO: 22.7 % (ref 36.6–50.3)
HGB BLD-MCNC: 6.7 G/DL (ref 12.1–17)
MCH RBC QN AUTO: 22.1 PG (ref 26–34)
MCHC RBC AUTO-ENTMCNC: 29.5 G/DL (ref 30–36.5)
MCV RBC AUTO: 74.9 FL (ref 80–99)
NRBC # BLD: 0.14 K/UL (ref 0–0.01)
NRBC BLD-RTO: 1.8 PER 100 WBC
PLATELET # BLD AUTO: 193 K/UL (ref 150–400)
PMV BLD AUTO: 9.6 FL (ref 8.9–12.9)
RBC # BLD AUTO: 3.03 M/UL (ref 4.1–5.7)
WBC # BLD AUTO: 7.6 K/UL (ref 4.1–11.1)

## 2023-02-23 PROCEDURE — 85027 COMPLETE CBC AUTOMATED: CPT

## 2023-02-23 PROCEDURE — 74011000250 HC RX REV CODE- 250: Performed by: INTERNAL MEDICINE

## 2023-02-23 PROCEDURE — 74011250637 HC RX REV CODE- 250/637: Performed by: INTERNAL MEDICINE

## 2023-02-23 PROCEDURE — 36415 COLL VENOUS BLD VENIPUNCTURE: CPT

## 2023-02-23 PROCEDURE — 65660000001 HC RM ICU INTERMED STEPDOWN

## 2023-02-23 PROCEDURE — 74011250637 HC RX REV CODE- 250/637: Performed by: PSYCHIATRY & NEUROLOGY

## 2023-02-23 RX ADMIN — FERROUS SULFATE TAB 325 MG (65 MG ELEMENTAL FE) 325 MG: 325 (65 FE) TAB at 08:09

## 2023-02-23 RX ADMIN — SODIUM CHLORIDE, PRESERVATIVE FREE 10 ML: 5 INJECTION INTRAVENOUS at 06:36

## 2023-02-23 RX ADMIN — SODIUM CHLORIDE, PRESERVATIVE FREE 10 ML: 5 INJECTION INTRAVENOUS at 13:43

## 2023-02-23 RX ADMIN — RISPERIDONE 1 MG: 1 TABLET ORAL at 22:21

## 2023-02-23 RX ADMIN — OXYCODONE AND ACETAMINOPHEN 1 TABLET: 5; 325 TABLET ORAL at 13:46

## 2023-02-23 RX ADMIN — SODIUM CHLORIDE, PRESERVATIVE FREE 10 ML: 5 INJECTION INTRAVENOUS at 22:00

## 2023-02-23 RX ADMIN — AMITRIPTYLINE HYDROCHLORIDE 25 MG: 50 TABLET, FILM COATED ORAL at 22:21

## 2023-02-23 RX ADMIN — DIAZEPAM 2 MG: 2 TABLET ORAL at 22:24

## 2023-02-23 RX ADMIN — OXYCODONE AND ACETAMINOPHEN 1 TABLET: 5; 325 TABLET ORAL at 22:24

## 2023-02-23 NOTE — PROGRESS NOTES
CM met with patient to discuss the possible need for [facility]. Patient independently walking to the bathroom when CM entered room. Patient declined needing HH. DCP: home, self.

## 2023-02-23 NOTE — CONSULTS
[address]    Name:  Nickie Lafleur  MR#:  [id number]  :  1960  ACCOUNT #:  [de-identified]  DATE OF SERVICE:  2023    PSYCHIATRIC CONSULT    ORDERED BY:  Tamar Kerr MD    REASON FOR CONSULTATION:  Hallucination and restlessness. HISTORY OF PRESENT ILLNESS:  This is a 56-year-old  male patient, paraplegic, who summoned EMS for difficulty breathing which he has had for a week prior to admission, diagnosed as COPD approximately  ago, but takes no meds for this. The patient is alert, very pale. From psych point of view, he has depression, was seeing a psychiatrist up until 10/2021, was on  Prozac 20 mg three tablets in the morning, Remeron 30 mg at night, Valium 5 mg p.r.n. He states he was seeing shadows. Currently not getting any psychiatric help. The patient denied alcohol abuse, drug abuse, nicot. He apparently has his own place and has three grown children and grandchildren, he plays with them. The patient denied any depression or suicidal thoughts. PAST HISTORY:  Had prior psychiatric treatment. MEDICAL HISTORY:  Paraplegia. SURGICAL HISTORY:  None. FAMILY HISTORY:  No family history on file. TRAUMA HISTORY:  No known trauma. ALLERGIES TO MEDICATIONS:  NO KNOWN ALLERGIES. LABORATORY DATA:  Metabolic panel:  CO2 of 17, otherwise unremarkable. Troponin 29. D-dimer 1.84. COVID-19 not detected. Influenza A and B negative. Ferritin 4. Iron profile:  Iron 11, TIBC 461, iron saturation 2%. . CBC:  RBC 1.43 , hemoglobin 2, hematocrit 8.2, MCV 57.3, MCH 14, MCHC 24, RDW 22.4, platelets 335. TSH 1.77. Vitamin B12 1093, folate 15.6. Urinalysis:  Protein 30, leuk esterase trace, wbc's 10-20. Urine culture ordered. Urine culture:  No growth yet. CBC:  RBC 2.53, hemoglobin 5.8, I believe this is after 3 units transfusion. Renal functions:  Glucose 102, creatinine 0.69.     VITAL SIGNS:  Temperature 97.3; pulse 70; blood pressure 95/66, blood pressure at 3 p.m. 116/78; respirations 15; SpO2 of 98 on room air. CURRENT MEDICATIONS:  1. Lasix 40 mg daily. 2.  Normal saline flush. 3.  Zofran. 4.  Melatonin 6 mg.  5.  MiraLax. 6.  Phenergan. MENTAL STATUS EXAM:  Resting, woke up, polite, cooperative. Oriented to all the three spheres, coherent. Thoughts are linear, logical, is somewhat jovial, says spends time with his grandchildren, has his own place. Denies suicidal thoughts, denies homicidal thought. Memory recall is fair. IQ about average. Insight poor except he has slept poorly for several days, but 5 mg of Valium has helped him, wants Valium. DIAGNOSES:  History of major depression, recurrent. Insomnia. Severe anemia, blood transfused. DISPOSITION:  1. Start him on amitriptyline 25 mg at night for insomnia. 2.  Valium 2 mg every 4 hours p.r.n. for anxiety. 3.  I will place him on risperidone 1 mg at night to help hallucinations.       Riaz Almanza MD      [id number]  D:  02/22/2023 15:39  T:  02/22/2023 18:55  JOB #:  [id number]

## 2023-02-23 NOTE — PROGRESS NOTES
Hematology and Oncology Progress Note    Patient: Preethi Monzon MRN: [id number]  SSN: [id number]    YOB: 1960  Age: 60 y.o. Sex: male      Admit Date: 2/21/2023    LOS: 2 days     Chief Complaint: Patient was admitted with increasing shortness of breath and severe fatigue    Subjective:     Patient is somewhat agitated. He does not have any mucosal bleeding. He is feeling better. He is not as short of breath like before. Objective:     Vitals:    02/22/23 2200 02/22/23 2300 02/23/23 0012 02/23/23 0732   BP: 114/80 100/76 116/64 (!) 94/54   Pulse: 84 85 89 88   Resp: 18 18 18 16   Temp:  97.5 °F (36.4 °C) 97.3 °F (36.3 °C) 98.2 °F (36.8 °C)   SpO2: 100% 99% 97% 99%   Weight:       Height:          Physical Exam:  Constitutional: [de-identified] white male looks older for his age. Not in any acute distress or pain. His complexion is significantly pale. Eyes: Sclerae anicteric. Conjunctivae shows severe pallor. ENMT: Oral mucosa is moist, no thrush, mucositis, or petechiae. Neck: No adenopathy   Respiratory: Lungs are clear bilaterally. Cardiovascular: Sinus tachycardia with holosystolic murmur. Abdomen: Soft, nontender, no hepatosplenomegaly. No guarding or rigidity. Bowel sounds present. Extremities: No edema. Skin: No petechiae; no skin rash. Neurologic: Alert/oriented x 3. Has lower extremity weakness.     Lab/Data Review:    Recent Results (from the past 24 hour(s))   CBC W/O DIFF    Collection Time: 02/23/23  8:10 AM   Result Value Ref Range    WBC 7.6 4.1 - 11.1 K/uL    RBC 3.03 (L) 4.10 - 5.70 M/uL    HGB 6.7 (L) 12.1 - 17.0 g/dL    HCT 22.7 (L) 36.6 - 50.3 %    MCV 74.9 (L) 80.0 - 99.0 FL    MCH 22.1 (L) 26.0 - 34.0 PG    MCHC 29.5 (L) 30.0 - 36.5 g/dL    PLATELET 842 854 - 936 K/uL    MPV 9.6 8.9 - 12.9 FL    NRBC 1.8 (H) 0.0  WBC    ABSOLUTE NRBC 0.14 (H) 0.00 - 0.01 K/uL           Assessment and plan:     77-year-old white male with history of COPD and paraplegia came with shortness of breath and was found to have symptomatic anemia. Patient has no visible bleeding and has no history of previous anemia. Just based on his overall symptomatology I think his anemia must be chronic and must have developed over a long time. He does have anemia symptoms but not as bad as his numbers look. 1) anemia: I definitely agree with packed RBC transfusion. We have started anemia work-up and based on the work-up results we will make further recommendations.  -His peripheral smear on 21 February shows significant hypochromia and microcytosis, occasional nucleated RBCs, schistocytes, fragmented RBCs, occasional teardrop RBCs. There is significant anisocytosis and poikilocytosis is seen. There are no blast cells are seen. 12/21:  I had a very long discussion with patient and his son and daughter regarding his severe anemia and my current impression. We also discussed about further diagnostic plan and explained to them that if noninvasive work-up does not reveal the cause of his anemia then we may even consider doing bone marrow biopsy and aspiration.  -While we are giving him packed RBC transfusion we will have to be careful with fluid overload. Patient may also develop delusional thrombocytopenia because of significant packed RBC transfusion. 12/22: Patient had packed RBC transfusion and his hemoglobin went up to 5.8. I will hold off any further transfusion today. We will repeat CBC again tomorrow and decide whether he needs any further transfusion. Patient's iron studies shows iron of 11, TIBC 461, iron saturation 2% and ferritin of 4. Patient's vitamin B12 is 1093 and folate is 15.6. These numbers are consistent with iron deficiency anemia. I have started patient on ferrous sulfate and I will get GI evaluation for possible GI blood loss. Case was discussed with attending physician. Case was also discussed with patient's nurse.   Explained to patient about his diagnosis and work-up plan. Also did some reassurance and counseling. 12/23: Patient's hemoglobin has improved to 6.7 and hematocrit is 22.7 today. Discussed with patient about possible GI blood loss and GI consult. Patient was somewhat reluctant to go for endoscopy where I have asked him to at least talk to gastroenterologist before making final decision. This dictation was done by dragon, computer voice recognition software. Often unanticipated grammatical, syntax, phones and other interpretive errors are inadvertently transcribed. Please excuse errors that have escaped final proofreading.        Signed By: Mary Lou Carter MD     February 23, 2023

## 2023-02-23 NOTE — PROGRESS NOTES
Hospitalist Progress Note               Daily Progress Note: 2/23/2023      Subjective:     2/23/2023-states that he had a good night of sleep. Feels a lot better. Not as short of breath. Did not cooperative physical therapy. Eating better. 2/22/2023-transfused 3 units PRBCs, reported hallucinations, reported back spasms and leg spasms-escalated need for pain medications. Received intermittent Lasix. Did not appreciate fluid restriction and salt restricted diet  Later seen by psychiatry. Placed him on amitriptyline; Valium for anxiety and risperidone for hallucinations    Hospital course to date:  Abi Roberts is a 60 y.o. male who presents with shortness of breath. He had past medical history of COPD and paraplegia. He reports to be experiencing worsening shortness of breath on exertion and must rest to regain his breath. He reports to be having difficult time forming sentences while short of breath. He reports chronic pain in his left axillae, left lower extremity and low back at the level of t10-l1 which apparently is the same level of his spinal damage. He is not very mobile and uses a wheel chair when not using leg bracing. He has history of amputations of the toes.            Problem List:  Problem List as of 2/23/2023 Never Reviewed            Codes Class Noted - Resolved    Severe anemia ICD-10-CM: D64.9  ICD-9-CM: 285.9  2/21/2023 - Present           Medications reviewed  Current Facility-Administered Medications   Medication Dose Route Frequency    oxyCODONE-acetaminophen (PERCOCET) 5-325 mg per tablet 1 Tablet  1 Tablet Oral Q6H PRN    amitriptyline (ELAVIL) tablet 25 mg  25 mg Oral QHS    diazePAM (VALIUM) tablet 2 mg  2 mg Oral Q6H PRN    risperiDONE (RisperDAL) tablet 1 mg  1 mg Oral QHS    ferrous sulfate tablet 325 mg  1 Tablet Oral DAILY WITH BREAKFAST    0.9% sodium chloride infusion 250 mL  250 mL IntraVENous PRN    sodium chloride (NS) flush 5-40 mL  5-40 mL IntraVENous Q8H sodium chloride (NS) flush 5-40 mL  5-40 mL IntraVENous PRN    acetaminophen (TYLENOL) tablet 650 mg  650 mg Oral Q6H PRN    Or    acetaminophen (TYLENOL) suppository 650 mg  650 mg Rectal Q6H PRN    polyethylene glycol (MIRALAX) packet 17 g  17 g Oral DAILY PRN    promethazine (PHENERGAN) tablet 12.5 mg  12.5 mg Oral Q6H PRN    Or    ondansetron (ZOFRAN) injection 4 mg  4 mg IntraVENous Q6H PRN    melatonin tablet 6 mg  6 mg Oral QHS PRN    lidocaine 4 % patch 2 Patch  2 Patch TransDERmal Q12H PRN       Review of Systems:   Pertinent items are noted in HPI. Objective:   Physical Exam:     Visit Vitals  BP (!) 94/54 (BP 1 Location: Right upper arm, BP Patient Position: At rest)   Pulse 88   Temp 98.2 °F (36.8 °C)   Resp 16   Ht 5' 8\" (1.727 m)   Wt 54.4 kg (120 lb)   SpO2 99%   BMI 18.25 kg/m²      O2 Device: None (Room air)    Temp (24hrs), Av.7 °F (36.5 °C), Min:97.3 °F (36.3 °C), Max:98.2 °F (36.8 °C)    No intake/output data recorded.  1901 -  0700  In: 1770.8 [P.O.:840]  Out: 1350 [Urine:1350]    General:   Awake and alert   Lungs:   Clear to auscultation bilaterally. Chest wall:  No tenderness or deformity. Heart:  Regular rate and rhythm, S1, S2 normal, no murmur, click, rub or gallop. Abdomen:   Soft, non-tender. Bowel sounds normal. No masses,  No organomegaly. Extremities: Extremities normal, atraumatic, no cyanosis or edema. Pulses: 2+ and symmetric all extremities. Skin: Skin color, texture, turgor normal. No rashes or lesions   Neurologic: CNII-XII intact.   No gross focal deficits    No gross lymphadenopathy grossly         Data Review:       Recent Days:  Recent Labs     23  0810 23  0630 23  1547   WBC 7.6 7.6 9.3   HGB 6.7* 5.8* 2.0*   HCT 22.7* 18.6* 8.2*    190 272     Recent Labs     23  0630 23  1309    136   K 3.6 4.2    108   CO2 24 17*   * 100   BUN 20 18   CREA 0.69* 0.78   CA 7.7* 8.7   PHOS 3.2  --    ALB 3. 0* 3.5   TBILI  --  1.0   ALT  --  466*     No results for input(s): PH, PCO2, PO2, HCO3, FIO2 in the last 72 hours. 24 Hour Results:  Recent Results (from the past 24 hour(s))   CBC W/O DIFF    Collection Time: 02/23/23  8:10 AM   Result Value Ref Range    WBC 7.6 4.1 - 11.1 K/uL    RBC 3.03 (L) 4.10 - 5.70 M/uL    HGB 6.7 (L) 12.1 - 17.0 g/dL    HCT 22.7 (L) 36.6 - 50.3 %    MCV 74.9 (L) 80.0 - 99.0 FL    MCH 22.1 (L) 26.0 - 34.0 PG    MCHC 29.5 (L) 30.0 - 36.5 g/dL    PLATELET 227 593 - 715 K/uL    MPV 9.6 8.9 - 12.9 FL    NRBC 1.8 (H) 0.0  WBC    ABSOLUTE NRBC 0.14 (H) 0.00 - 0.01 K/uL       CTA CHEST W OR W WO CONT   Final Result      1. No evidence of pulmonary embolism. 2. Mild pulmonary edema in the lower lobes. Trace right pleural effusion. Trace   perihepatic ascites. 3. Cardiomegaly. Enlarged pulmonary arteries, consistent with pulmonary arterial   hypertension. 4. Additional incidental findings as above. XR CHEST SNGL V   Final Result   Central pulmonary vascular congestion versus perihilar opacities   that can be seen with a viral process. Assessment:    Severe chronic anemia-s/p blood transfusion with iron deficiency  Acute CHF, high-output secondary to severe anemia  History of major depression  Recurrent insomnia  Protein calorie malnutrition  Acute on chronic anemia  Paraparesis lower extremities  Bilateral foot drop  Indwelling Hardy catheter  High lumbar low thoracic spinal fusion  Noncompliance      Discussion/MDM: Patient with multiple medical comorbidities, each with high likelihood for morbidity and mortality if left untreated. Patient being treated with medication that requires intensive monitoring due to increased risk for toxicity. I have reviewed patient's presenting subjective and objective findings, as well as all laboratory studies, imaging studies, and vital signs to date as well as treatment rendered and patient's response to those treatments. In addition, prior medical, surgical and relevant social and family histories were reviewed. Plan:  Apparently  psych input  Appreciate PT input  GI consult placed because of findings of chronic and severe iron deficiency    Care Plan discussed with: Patient/Family    Code status: Full    Social determinants of health:    Disposition:     Total time spent with patient: 37 minutes. Nithya Alonso MD    I agree with clinical findings, examination, assessment and plan of medical student. who was supervised by me during this assessment

## 2023-02-23 NOTE — PROGRESS NOTES
PT consult received and PT screen completed. Pt at this time does not want therapy services. He appears to be at his functional baseline per his report. Pt has hx of spinal injury at T10-11 and overall does not ambulate much. He uses a wheel chair and has bracing for his LEs for any attempts of ambulation. Pt declined to don leg bracing during screen today. Pt was able to I sit EOB and stand at bedside with supervision with UE support on RW. Pt states he is functioning as he normally does and does not need therapy. PT advised pt PT would be discontinued but if he had a change in status or felt he needed to work with therapy then PT would be happy to reassess. Will plan to d/c PT post screen.

## 2023-02-24 LAB
BACTERIA SPEC CULT: ABNORMAL
BASOPHILS # BLD: 0.1 K/UL (ref 0–0.1)
BASOPHILS NFR BLD: 1 % (ref 0–1)
COLONY COUNT,CNT: ABNORMAL
DIFFERENTIAL METHOD BLD: ABNORMAL
EOSINOPHIL # BLD: 0.1 K/UL (ref 0–0.4)
EOSINOPHIL NFR BLD: 1 % (ref 0–7)
HCT VFR BLD AUTO: 21.2 % (ref 36.6–50.3)
HGB BLD-MCNC: 6.3 G/DL (ref 12.1–17)
IMM GRANULOCYTES # BLD AUTO: 0 K/UL
IMM GRANULOCYTES NFR BLD AUTO: 0 %
LYMPHOCYTES # BLD: 1.7 K/UL (ref 0.8–3.5)
LYMPHOCYTES NFR BLD: 18 % (ref 12–49)
MCH RBC QN AUTO: 22.7 PG (ref 26–34)
MCHC RBC AUTO-ENTMCNC: 29.7 G/DL (ref 30–36.5)
MCV RBC AUTO: 76.3 FL (ref 80–99)
MONOCYTES # BLD: 0.6 K/UL (ref 0–1)
MONOCYTES NFR BLD: 6 % (ref 5–13)
MYELOCYTES NFR BLD MANUAL: 2 %
NEUTS BAND NFR BLD MANUAL: 1 % (ref 0–6)
NEUTS SEG # BLD: 6.5 K/UL (ref 1.8–8)
NEUTS SEG NFR BLD: 69 % (ref 32–75)
NRBC # BLD: 0.13 K/UL (ref 0–0.01)
NRBC BLD-RTO: 1.4 PER 100 WBC
OTHER CELLS NFR BLD MANUAL: 2 %
PLATELET # BLD AUTO: 158 K/UL (ref 150–400)
PMV BLD AUTO: 10 FL (ref 8.9–12.9)
RBC # BLD AUTO: 2.78 M/UL (ref 4.1–5.7)
RBC MORPH BLD: ABNORMAL
SPECIAL REQUESTS,SREQ: ABNORMAL
WBC # BLD AUTO: 9.3 K/UL (ref 4.1–11.1)

## 2023-02-24 PROCEDURE — 85025 COMPLETE CBC W/AUTO DIFF WBC: CPT

## 2023-02-24 PROCEDURE — 36430 TRANSFUSION BLD/BLD COMPNT: CPT

## 2023-02-24 PROCEDURE — 74011250637 HC RX REV CODE- 250/637: Performed by: INTERNAL MEDICINE

## 2023-02-24 PROCEDURE — 65660000001 HC RM ICU INTERMED STEPDOWN

## 2023-02-24 PROCEDURE — P9016 RBC LEUKOCYTES REDUCED: HCPCS

## 2023-02-24 PROCEDURE — 74011250637 HC RX REV CODE- 250/637: Performed by: PSYCHIATRY & NEUROLOGY

## 2023-02-24 PROCEDURE — 36415 COLL VENOUS BLD VENIPUNCTURE: CPT

## 2023-02-24 PROCEDURE — 30233N1 TRANSFUSION OF NONAUTOLOGOUS RED BLOOD CELLS INTO PERIPHERAL VEIN, PERCUTANEOUS APPROACH: ICD-10-PCS | Performed by: INTERNAL MEDICINE

## 2023-02-24 PROCEDURE — 74011000250 HC RX REV CODE- 250: Performed by: INTERNAL MEDICINE

## 2023-02-24 PROCEDURE — 74011000250 HC RX REV CODE- 250: Performed by: HOSPITALIST

## 2023-02-24 RX ORDER — SODIUM CHLORIDE 9 MG/ML
250 INJECTION, SOLUTION INTRAVENOUS AS NEEDED
Status: DISCONTINUED | OUTPATIENT
Start: 2023-02-24 | End: 2023-03-02 | Stop reason: HOSPADM

## 2023-02-24 RX ADMIN — OXYCODONE AND ACETAMINOPHEN 1 TABLET: 5; 325 TABLET ORAL at 10:00

## 2023-02-24 RX ADMIN — RISPERIDONE 1 MG: 1 TABLET ORAL at 21:04

## 2023-02-24 RX ADMIN — DIAZEPAM 2 MG: 2 TABLET ORAL at 14:14

## 2023-02-24 RX ADMIN — DIAZEPAM 2 MG: 2 TABLET ORAL at 21:04

## 2023-02-24 RX ADMIN — FERROUS SULFATE TAB 325 MG (65 MG ELEMENTAL FE) 325 MG: 325 (65 FE) TAB at 09:52

## 2023-02-24 RX ADMIN — OXYCODONE AND ACETAMINOPHEN 1 TABLET: 5; 325 TABLET ORAL at 21:04

## 2023-02-24 RX ADMIN — SODIUM CHLORIDE, PRESERVATIVE FREE 10 ML: 5 INJECTION INTRAVENOUS at 14:14

## 2023-02-24 RX ADMIN — AMITRIPTYLINE HYDROCHLORIDE 25 MG: 50 TABLET, FILM COATED ORAL at 21:04

## 2023-02-24 RX ADMIN — SODIUM CHLORIDE, PRESERVATIVE FREE 10 ML: 5 INJECTION INTRAVENOUS at 06:28

## 2023-02-24 NOTE — PROGRESS NOTES
Spiritual Care Assessment/Progress Note  [facility]      NAME: Perico Rachel      MRN: [id number]  AGE: 60 y.o.  SEX: male  Mormon Affiliation: No preference   Language: English     2/24/2023     Total Time (in minutes): 40     Spiritual Assessment begun in [facility] through conversation with:         [x]Patient        [] Family    [] Friend(s)        Reason for Consult: Initial/Spiritual assessment, patient floor     Spiritual beliefs: (Please include comment if needed)     [x] Identifies with a carlos tradition:         [] Supported by a carlos community:            [] Claims no spiritual orientation:           [] Seeking spiritual identity:                [] Adheres to an individual form of spirituality:           [] Not able to assess:                           Identified resources for coping:      [] Prayer                               [] Music                  [] Guided Imagery     [x] Family/friends                 [] Pet visits     [] Devotional reading                         [] Unknown     [] Other:                                               Interventions offered during this visit: (See comments for more details)    Patient Interventions: Affirmation of emotions/emotional suffering, Normalization of emotional/spiritual concerns, Life review/legacy, Initial/Spiritual assessment, patient floor, Coping skills reviewed/reinforced           Plan of Care:     [x] Support spiritual and/or cultural needs    [] Support AMD and/or advance care planning process      [] Support grieving process   [] Coordinate Rites and/or Rituals    [] Coordination with community clergy   [] No spiritual needs identified at this time   [] Detailed Plan of Care below (See Comments)  [] Make referral to Music Therapy  [] Make referral to Pet Therapy     [] Make referral to Addiction services  [] Make referral to [facility]  [] Make referral to Spiritual Care Partner  [] No future visits requested [] Contact Spiritual Care for further referrals     Comments: Patient seen by Yobany Medley M.Div. for Initial Spiritual Care Services Assessment. Patient states he is Confucianism non-practicing currently. Expressed thankfulness to God for being here after events leading to medical admission. Discussed life's legacy and review of family dynamics and time spent in service as a [employer]. Patient expressed sadness and grief at loss of father in December. Engaged in empathetic listening and offered words of hope and encouragement. To follow up as needed. THERON De Anda.   Intern

## 2023-02-24 NOTE — BH NOTES
PSYCHIATRIC PROGRESS NOTE         Patient Name  Alyssa Aguayo   Date of Birth 1960   [facility] [id number]   Medical Record Number  [id number]      Age  60 y.o. PCP None   Admit date:  2/21/2023    Room Number  225/01  @ [address]   Date of Service  2/24/2023            HISTORY OF PRESENT ILLNESS/INTERVAL HISTORY:    Alyssa Aguayo is 60 y.o. WHITE/NON- male with            Medical History:  No past medical history on file. No past surgical history on file. Past medical history from the initial psychiatric evaluation has been reviewed (reviewed/updated 2/24/2023) with no additional updates (I asked patient and no additional past medical history provided).      Allergies: No Known Allergies    SH:   Social History     Tobacco Use    Smoking status: Not on file    Smokeless tobacco: Not on file   Substance Use Topics    Alcohol use: Not on file         Social history from the initial psychiatric evaluation has been reviewed (reviewed/updated 2/24/2023) with no additional updates             MENTAL STATUS EXAM & VITALS              VITALS:     Patient Vitals for the past 24 hrs:   Temp Pulse Resp BP SpO2   02/23/23 2021 97.8 °F (36.6 °C) 98 18 110/77 100 %   02/23/23 1628 98.2 °F (36.8 °C) 97 20 116/69 --   02/23/23 1347 97.7 °F (36.5 °C) 74 18 114/64 98 %   02/23/23 0732 98.2 °F (36.8 °C) 88 16 (!) 94/54 99 %     Wt Readings from Last 3 Encounters:   02/21/23 54.4 kg (120 lb)     Temp Readings from Last 3 Encounters:   02/23/23 97.8 °F (36.6 °C)     BP Readings from Last 3 Encounters:   02/23/23 110/77     Pulse Readings from Last 3 Encounters:   02/23/23 98            DATA     LABORATORY DATA:(reviewed/updated 2/24/2023)  Recent Results (from the past 24 hour(s))   CBC W/O DIFF    Collection Time: 02/23/23  8:10 AM   Result Value Ref Range    WBC 7.6 4.1 - 11.1 K/uL    RBC 3.03 (L) 4.10 - 5.70 M/uL    HGB 6.7 (L) 12.1 - 17.0 g/dL    HCT 22.7 (L) 36.6 - 50.3 %    MCV 74.9 (L) 80.0 - 99.0 FL MCH 22.1 (L) 26.0 - 34.0 PG    MCHC 29.5 (L) 30.0 - 36.5 g/dL    PLATELET 335 402 - 974 K/uL    MPV 9.6 8.9 - 12.9 FL    NRBC 1.8 (H) 0.0  WBC    ABSOLUTE NRBC 0.14 (H) 0.00 - 0.01 K/uL     No results found for: VALF2, VALAC, VALP, VALPR, DS6, CRBAM, CRBAMP, CARB2, XCRBAM  No results found for: LITHM   RADIOLOGY REPORTS:(reviewed/updated 2/24/2023)  XR CHEST SNGL V    Result Date: 2/21/2023  EXAM:  XR CHEST SNGL V INDICATION: Shortness of breath COMPARISON: None. TECHNIQUE: Portable AP upright chest view at 1344 hours FINDINGS: The cardiomediastinal contours are within normal limits. There is central pulmonary vascular congestion versus perihilar opacities. There is no pleural effusion or pneumothorax. There are degenerative changes in the left shoulder. The upper abdomen is unremarkable. Central pulmonary vascular congestion versus perihilar opacities that can be seen with a viral process. CTA CHEST W OR W WO CONT    Result Date: 2/21/2023  EXAM:  CTA CHEST W OR W WO CONT INDICATION: Eval for PE COMPARISON: Chest radiograph 2/21/2023. CONTRAST:  100 mL of Isovue-370. ? Technique: Following the uneventful intravenous administration of contrast, thin axial images were obtained through the chest. Coronal and sagittal reconstructions were generated. MIP image reconstructions were also performed. CT dose reduction was achieved through use of a standardized protocol tailored for this examination and automatic exposure control for dose modulation. ? Findings: Vascular: No evidence of acute or chronic pulmonary embolism. The pulmonary arteries are enlarged. The thoracic aorta is normal in size. Mild calcific atherosclerosis of the descending thoracic aorta. Chest: Lungs/Pleura: Trace right pleural effusion. Mild interlobular septal thickening in the lower lobes with few scattered groundglass opacities. No pneumothorax. No suspicious pulmonary nodules. Axilla/Soft Tissue: No pathologic axillary adenopathy. Mediastinum: Mild cardiomegaly. No pericardial effusion. Coronary artery calcium: Absent. No pathologic mediastinal or hilar adenopathy. Upper Abdomen: Trace perihepatic ascites. Multiple simple cysts in the upper poles of the kidneys. Bones: No evidence of acute fracture, dislocation, or aggressive osseous abnormality. Mild chronic compression deformities of T11, T12, and L1. 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 1. No evidence of pulmonary embolism. 2. Mild pulmonary edema in the lower lobes. Trace right pleural effusion. Trace perihepatic ascites. 3. Cardiomegaly. Enlarged pulmonary arteries, consistent with pulmonary arterial hypertension. 4. Additional incidental findings as above.           MEDICATIONS     ALL MEDICATIONS:   Current Facility-Administered Medications   Medication Dose Route Frequency    oxyCODONE-acetaminophen (PERCOCET) 5-325 mg per tablet 1 Tablet  1 Tablet Oral Q6H PRN    amitriptyline (ELAVIL) tablet 25 mg  25 mg Oral QHS    diazePAM (VALIUM) tablet 2 mg  2 mg Oral Q6H PRN    risperiDONE (RisperDAL) tablet 1 mg  1 mg Oral QHS    ferrous sulfate tablet 325 mg  1 Tablet Oral DAILY WITH BREAKFAST    0.9% sodium chloride infusion 250 mL  250 mL IntraVENous PRN    sodium chloride (NS) flush 5-40 mL  5-40 mL IntraVENous Q8H    sodium chloride (NS) flush 5-40 mL  5-40 mL IntraVENous PRN    acetaminophen (TYLENOL) tablet 650 mg  650 mg Oral Q6H PRN    Or    acetaminophen (TYLENOL) suppository 650 mg  650 mg Rectal Q6H PRN    polyethylene glycol (MIRALAX) packet 17 g  17 g Oral DAILY PRN    promethazine (PHENERGAN) tablet 12.5 mg  12.5 mg Oral Q6H PRN    Or    ondansetron (ZOFRAN) injection 4 mg  4 mg IntraVENous Q6H PRN    melatonin tablet 6 mg  6 mg Oral QHS PRN    lidocaine 4 % patch 2 Patch  2 Patch TransDERmal Q12H PRN      SCHEDULED MEDICATIONS:   Current Facility-Administered Medications   Medication Dose Route Frequency    amitriptyline (ELAVIL) tablet 25 mg  25 mg Oral QHS    risperiDONE (RisperDAL) tablet 1 mg  1 mg Oral QHS    ferrous sulfate tablet 325 mg  1 Tablet Oral DAILY WITH BREAKFAST    sodium chloride (NS) flush 5-40 mL  5-40 mL IntraVENous Q8H          ASSESSMENT & PLAN     Continue close observation  Discussed meds  Provided support, psycho edu  Discussed with staff in the treatment team, the progress made in therapy, psychosocial needs and nursing concerns. Patient seen for follow-up chart reviewed patient sitting on the bed alert awake polite still having shortness of breath if he made some going to the bathroom to get up slowly and he asked about his hemoglobin shared with him and he lives alone he is hoping that his son be with him for at least a couple of days however him in home health was offered by the  is to be seen by him to gastroenterology consult supported      The following regarding medications was addressed during rounds with patient: SEE ABOVE for medications  the risks and benefits of the proposed medication. The patient was given the opportunity to ask questions. Informed consent given to the use of the above medications. Will continue to adjust psychiatric and non-psychiatric medications (see above \"medication\" section and orders section for details) as deemed appropriate & based upon diagnoses and response to treatment. Reviewed blood tests/labs and diagnostic tests as deemed appropriate and review results as they become available (see orders for details and above listed lab/test results). Obtain psychiatric records from previous [facility] hospitals to further elucidate the nature of patient's psychopathology and review once available.     Gather additional collateral information from friends, family and o/p treatment team to further elucidate the nature of patient's psychopathology and baselline level of psychiatric functioning. I certify that this patient's inpatient psychiatric hospital services continue to be, required for treatment that could reasonably be expected to improve the patient's condition and that the patient continues to need, on a daily basis, active treatment furnished directly by or requiring the supervision of inpatient psychiatric facility personnel. In addition, the hospital records show that services furnished were intensive treatment services.     Signed By:   Davin Myrick MD  2/24/2023

## 2023-02-24 NOTE — PROGRESS NOTES
Hospitalist Progress Note               Daily Progress Note: 2/24/2023      Subjective:       2/24/23-no new complaints to me. Explained to me that he will consider endoscope. GI consult pending. No blood in stool reported. Not short of breath. 2/23/2023-states that he had a good night of sleep. Feels a lot better. Not as short of breath. Did not cooperative physical therapy. Eating better. 2/22/2023-transfused 3 units PRBCs, reported hallucinations, reported back spasms and leg spasms-escalated need for pain medications. Received intermittent Lasix. Did not appreciate fluid restriction and salt restricted diet  Later seen by psychiatry. Placed him on amitriptyline; Valium for anxiety and risperidone for hallucinations    Hospital course to date:  Jose Wnin is a 60 y.o. male who presents with shortness of breath. He had past medical history of COPD and paraplegia. He reports to be experiencing worsening shortness of breath on exertion and must rest to regain his breath. He reports to be having difficult time forming sentences while short of breath. He reports chronic pain in his left axillae, left lower extremity and low back at the level of t10-l1 which apparently is the same level of his spinal damage. He is not very mobile and uses a wheel chair when not using leg bracing. He has history of amputations of the toes.            Problem List:  Problem List as of 2/24/2023 Never Reviewed            Codes Class Noted - Resolved    Severe anemia ICD-10-CM: D64.9  ICD-9-CM: 285.9  2/21/2023 - Present           Medications reviewed  Current Facility-Administered Medications   Medication Dose Route Frequency    oxyCODONE-acetaminophen (PERCOCET) 5-325 mg per tablet 1 Tablet  1 Tablet Oral Q6H PRN    amitriptyline (ELAVIL) tablet 25 mg  25 mg Oral QHS    diazePAM (VALIUM) tablet 2 mg  2 mg Oral Q6H PRN    risperiDONE (RisperDAL) tablet 1 mg  1 mg Oral QHS    ferrous sulfate tablet 325 mg  1 Tablet Oral DAILY WITH BREAKFAST    0.9% sodium chloride infusion 250 mL  250 mL IntraVENous PRN    sodium chloride (NS) flush 5-40 mL  5-40 mL IntraVENous Q8H    sodium chloride (NS) flush 5-40 mL  5-40 mL IntraVENous PRN    acetaminophen (TYLENOL) tablet 650 mg  650 mg Oral Q6H PRN    Or    acetaminophen (TYLENOL) suppository 650 mg  650 mg Rectal Q6H PRN    polyethylene glycol (MIRALAX) packet 17 g  17 g Oral DAILY PRN    promethazine (PHENERGAN) tablet 12.5 mg  12.5 mg Oral Q6H PRN    Or    ondansetron (ZOFRAN) injection 4 mg  4 mg IntraVENous Q6H PRN    melatonin tablet 6 mg  6 mg Oral QHS PRN    lidocaine 4 % patch 2 Patch  2 Patch TransDERmal Q12H PRN       Review of Systems:   Pertinent items are noted in HPI. Objective:   Physical Exam:     Visit Vitals  BP 99/62 (BP 1 Location: Right upper arm, BP Patient Position: At rest;Lying)   Pulse 85   Temp 98 °F (36.7 °C)   Resp 20   Ht 5' 8\" (1.727 m)   Wt 54.4 kg (120 lb)   SpO2 100%   BMI 18.25 kg/m²      O2 Device: None (Room air)    Temp (24hrs), Av.9 °F (36.6 °C), Min:97.7 °F (36.5 °C), Max:98.2 °F (36.8 °C)    No intake/output data recorded.  1901 -  0700  In: -   Out: 400 [Urine:400]    General:   Awake and alert   Lungs:   Clear to auscultation bilaterally. Chest wall:  No tenderness or deformity. Heart:  Regular rate and rhythm, S1, S2 normal, no murmur, click, rub or gallop. Abdomen:   Soft, non-tender. Bowel sounds normal. No masses,  No organomegaly. Extremities: Extremities normal, atraumatic, no cyanosis or edema. Pulses: 2+ and symmetric all extremities. Skin: Skin color, texture, turgor normal. No rashes or lesions   Neurologic: CNII-XII intact.   No gross focal deficits    No gross lymphadenopathy          Data Review:       Recent Days:  Recent Labs     23  0414 23  0810 23  0630   WBC 9.3 7.6 7.6   HGB 6.3* 6.7* 5.8*   HCT 21.2* 22.7* 18.6*    193 190     Recent Labs     23  0630 02/21/23  1309    136   K 3.6 4.2    108   CO2 24 17*   * 100   BUN 20 18   CREA 0.69* 0.78   CA 7.7* 8.7   PHOS 3.2  --    ALB 3.0* 3.5   TBILI  --  1.0   ALT  --  466*     No results for input(s): PH, PCO2, PO2, HCO3, FIO2 in the last 72 hours. 24 Hour Results:  Recent Results (from the past 24 hour(s))   CBC W/O DIFF    Collection Time: 02/23/23  8:10 AM   Result Value Ref Range    WBC 7.6 4.1 - 11.1 K/uL    RBC 3.03 (L) 4.10 - 5.70 M/uL    HGB 6.7 (L) 12.1 - 17.0 g/dL    HCT 22.7 (L) 36.6 - 50.3 %    MCV 74.9 (L) 80.0 - 99.0 FL    MCH 22.1 (L) 26.0 - 34.0 PG    MCHC 29.5 (L) 30.0 - 36.5 g/dL    PLATELET 307 980 - 400 K/uL    MPV 9.6 8.9 - 12.9 FL    NRBC 1.8 (H) 0.0  WBC    ABSOLUTE NRBC 0.14 (H) 0.00 - 0.01 K/uL   CBC WITH AUTOMATED DIFF    Collection Time: 02/24/23  4:14 AM   Result Value Ref Range    WBC 9.3 4.1 - 11.1 K/uL    RBC 2.78 (L) 4.10 - 5.70 M/uL    HGB 6.3 (L) 12.1 - 17.0 g/dL    HCT 21.2 (L) 36.6 - 50.3 %    MCV 76.3 (L) 80.0 - 99.0 FL    MCH 22.7 (L) 26.0 - 34.0 PG    MCHC 29.7 (L) 30.0 - 36.5 g/dL    PLATELET 042 388 - 751 K/uL    MPV 10.0 8.9 - 12.9 FL    NRBC 1.4 (H) 0.0  WBC    ABSOLUTE NRBC 0.13 (H) 0.00 - 0.01 K/uL    NEUTROPHILS PENDING %    LYMPHOCYTES PENDING %    MONOCYTES PENDING %    EOSINOPHILS PENDING %    BASOPHILS PENDING %    IMMATURE GRANULOCYTES PENDING %    ABS. NEUTROPHILS PENDING K/UL    ABS. LYMPHOCYTES PENDING K/UL    ABS. MONOCYTES PENDING K/UL    ABS. EOSINOPHILS PENDING K/UL    ABS. BASOPHILS PENDING K/UL    ABS. IMM. GRANS. PENDING K/UL    DF PENDING        CTA CHEST W OR W WO CONT   Final Result      1. No evidence of pulmonary embolism. 2. Mild pulmonary edema in the lower lobes. Trace right pleural effusion. Trace   perihepatic ascites. 3. Cardiomegaly. Enlarged pulmonary arteries, consistent with pulmonary arterial   hypertension. 4. Additional incidental findings as above.          XR CHEST SNGL V   Final Result   Central pulmonary vascular congestion versus perihilar opacities   that can be seen with a viral process. Assessment:    Severe chronic anemia-s/p blood transfusion with iron deficiency; hemoglobin at 6.3  Acute CHF, high-output secondary to severe anemia; symptom control better  History of major depression-followed by psychiatry and initiated on medications  Recurrent insomnia-initiated on medications  Protein calorie malnutrition  Paraparesis lower extremities  Bilateral foot drop  Indwelling Hardy catheter  High lumbar low thoracic spinal fusion  Noncompliance      Discussion/MDM: Patient with multiple medical comorbidities, each with high likelihood for morbidity and mortality if left untreated. Patient being treated with medication that requires intensive monitoring due to increased risk for toxicity. I have reviewed patient's presenting subjective and objective findings, as well as all laboratory studies, imaging studies, and vital signs to date as well as treatment rendered and patient's response to those treatments. In addition, prior medical, surgical and relevant social and family histories were reviewed. Plan:  GI consult-the endoscope or outpatient plan of care. Has refused PT/OT. If no further intervention, will have patient discharged with outpatient follow-up. Care Plan discussed with: Patient/Family    Code status: Full    Social determinants of health:    Disposition:     Total time spent with patient: 39 minutes. Veronica Nguyen MD    I agree with clinical findings, examination, assessment and plan of medical student. who was supervised by me during this assessment

## 2023-02-24 NOTE — CONSULTS
Consult    Patient: Shaw Pineda MRN: [id number]  SSN: [id number]    YOB: 1960  Age: 60 y.o. Sex: male      Subjective:      Shaw Pineda is a 60 y.o. male who is being seen for anemia  He presents with shortness of breath. reports to be experiencing worsening shortness of breath on exertion and must rest to regain his breath. he was admitted to hospital for CHF treatment, had a severe anemia, 2 unit  PRBC transfusion, stil pale and shortness of breath today no documented melena, no red blood per rectum, no severe abdominal pain, GI consultation placed, patient not sure had a colonoscopy done before. With me    Patient was not on any NSAIDs,     no blood in urine no blood in stool, patient has back wound. No past medical history on file. Pagophagia,  COPD  No past surgical history on file. No family history on file.   Social History     Tobacco Use    Smoking status: Not on file    Smokeless tobacco: Not on file   Substance Use Topics    Alcohol use: Not on file      Current Facility-Administered Medications   Medication Dose Route Frequency Provider Last Rate Last Admin    oxyCODONE-acetaminophen (PERCOCET) 5-325 mg per tablet 1 Tablet  1 Tablet Oral Q6H PRN Gayleen Harada, MD   1 Tablet at 02/24/23 1000    amitriptyline (ELAVIL) tablet 25 mg  25 mg Oral QHS Angie Edmonds MD   25 mg at 02/23/23 2221    diazePAM (VALIUM) tablet 2 mg  2 mg Oral Q6H PRN Angie Edmonds MD   2 mg at 02/24/23 1414    risperiDONE (RisperDAL) tablet 1 mg  1 mg Oral QHS Angie Edmonds MD   1 mg at 02/23/23 2221    ferrous sulfate tablet 325 mg  1 Tablet Oral DAILY WITH Cyndi Davidson MD   325 mg at 02/24/23 2354    0.9% sodium chloride infusion 250 mL  250 mL IntraVENous PRN Hanna Moise MD        sodium chloride (NS) flush 5-40 mL  5-40 mL IntraVENous Q8H Betsy Contreras MD   10 mL at 02/24/23 1414    sodium chloride (NS) flush 5-40 mL  5-40 mL IntraVENous PRN Gayleen Harada, MD acetaminophen (TYLENOL) tablet 650 mg  650 mg Oral Q6H PRN Tatiana Wang MD   650 mg at 02/21/23 2247    Or    acetaminophen (TYLENOL) suppository 650 mg  650 mg Rectal Q6H PRN Luna Contreras MD        polyethylene glycol (MIRALAX) packet 17 g  17 g Oral DAILY PRN Luna Contreras MD        promethazine (PHENERGAN) tablet 12.5 mg  12.5 mg Oral Q6H PRN Luna Contreras MD        Or    ondansetron (ZOFRAN) injection 4 mg  4 mg IntraVENous Q6H PRN Luna Contreras MD        melatonin tablet 6 mg  6 mg Oral QHS PRN Kimberley Benjamin MD   6 mg at 02/21/23 2247    lidocaine 4 % patch 2 Patch  2 Patch TransDERmal Q12H PRN Kimberley Benjamin MD   2 Patch at 02/21/23 2247        No Known Allergies    Review of Systems:  Review of Systems   Constitutional:  Positive for malaise/fatigue. Eyes: Negative. Respiratory:  Positive for hemoptysis. Cardiovascular:  Positive for palpitations and orthopnea. Gastrointestinal:  Negative for abdominal pain, constipation, diarrhea and nausea. Neurological:  Positive for weakness. Psychiatric/Behavioral:  The patient has insomnia. Objective:     Vitals:    02/23/23 1628 02/23/23 2021 02/24/23 0436 02/24/23 0833   BP: 116/69 110/77 99/62 103/68   Pulse: 97 98 85 92   Resp: 20 18 20 20   Temp: 98.2 °F (36.8 °C) 97.8 °F (36.6 °C) 98 °F (36.7 °C) 97.7 °F (36.5 °C)   SpO2:  100% 100% 100%   Weight:       Height:            Physical Exam:  Physical Exam  Constitutional:       Appearance: He is ill-appearing. HENT:      Head: Atraumatic. Mouth/Throat:      Mouth: Mucous membranes are dry. Cardiovascular:      Rate and Rhythm: Regular rhythm. Heart sounds: Normal heart sounds. Abdominal:      Palpations: Abdomen is soft. Musculoskeletal:         General: Deformity present. Neurological:      General: No focal deficit present.    Psychiatric:         Mood and Affect: Mood normal.        Recent Results (from the past 24 hour(s))   CBC WITH AUTOMATED DIFF    Collection Time: 02/24/23  4:14 AM   Result Value Ref Range    WBC 9.3 4.1 - 11.1 K/uL    RBC 2.78 (L) 4.10 - 5.70 M/uL    HGB 6.3 (L) 12.1 - 17.0 g/dL    HCT 21.2 (L) 36.6 - 50.3 %    MCV 76.3 (L) 80.0 - 99.0 FL    MCH 22.7 (L) 26.0 - 34.0 PG    MCHC 29.7 (L) 30.0 - 36.5 g/dL    PLATELET 822 248 - 547 K/uL    MPV 10.0 8.9 - 12.9 FL    NRBC 1.4 (H) 0.0  WBC    ABSOLUTE NRBC 0.13 (H) 0.00 - 0.01 K/uL    NEUTROPHILS 69 32 - 75 %    BAND NEUTROPHILS 1 0 - 6 %    LYMPHOCYTES 18 12 - 49 %    MONOCYTES 6 5 - 13 %    EOSINOPHILS 1 0 - 7 %    BASOPHILS 1 0 - 1 %    MYELOCYTES 2 (H) 0 %    OTHER CELL 2 %    IMMATURE GRANULOCYTES 0 %    ABS. NEUTROPHILS 6.5 1.8 - 8.0 K/UL    ABS. LYMPHOCYTES 1.7 0.8 - 3.5 K/UL    ABS. MONOCYTES 0.6 0.0 - 1.0 K/UL    ABS. EOSINOPHILS 0.1 0.0 - 0.4 K/UL    ABS. BASOPHILS 0.1 0.0 - 0.1 K/UL    ABS. IMM. GRANS. 0.0 K/UL    DF Manual      RBC COMMENTS Anisocytosis  2+        RBC COMMENTS Microcytosis  2+        RBC COMMENTS Hypochromia  2+        RBC COMMENTS Polychromasia  2+            CTA CHEST W OR W WO CONT   Final Result      1. No evidence of pulmonary embolism. 2. Mild pulmonary edema in the lower lobes. Trace right pleural effusion. Trace   perihepatic ascites. 3. Cardiomegaly. Enlarged pulmonary arteries, consistent with pulmonary arterial   hypertension. 4. Additional incidental findings as above. XR CHEST SNGL V   Final Result   Central pulmonary vascular congestion versus perihilar opacities   that can be seen with a viral process.              Assessment:     Hospital Problems  Never Reviewed            Codes Class Noted POA    Severe anemia ICD-10-CM: D64.9  ICD-9-CM: 285.9  2/21/2023 Unknown       Obscure GI blood loss, stool test was not done, no reported melena, hematochezia     Vascular weakness shortness of breath, indicating symptomatic anemia    Microcytic, hypochromic, iron deficiency,    Plan:   Continue hemoglobin closely,  Stool test as needed for Hemoccult  Iron replacement  More blood transfusion    Outpatient EGD colonoscopy study,  Findings, plan discussed with patient  Signed By: Brina Monroe MD     February 24, 2023         Thank you for allowing me to participate in this patients care anemia positive stool  Cc Referring Physician   None

## 2023-02-24 NOTE — PROGRESS NOTES
0800- Dr. José Miguel Patricia paged. 56- Notified Dr. José Miguel Patricia that pt hgb 6.3, states to call GI, no new orders at this time. Will continue to monitor. Pt is asymptomatic and hemodynamically stable. 80- Dr. Gallagher Means notified via [employer], no new orders as of yet. Pt continues to be hemodynamically stable and asymptomatic. Will continue to monitor.

## 2023-02-24 NOTE — PROGRESS NOTES
Educated patient on preventing pressure injuries and purpose of dual nurse skin assessment; patient stated that a skin assessment would violate his right to privacy and hernan him of his dignity. Nurse obliged to not perform dual nurse skin assessment. Nurse educated patient use of call bell and bed alarm for the purpose of safety and preventing patient falls; patient disagreed with utilizing call bell for assistance to get up. Patient cursed and suggested that he would cause damage to the bed and the hospital room if the bed alarm were turned on for him. Notified nursing supervisor and CCL regarding patient demands to have door closed and bed alarm off in spite of difficulty walking due to bilateral foot drop and history of paraplegia. Acknowledged patient right to refuse safety measures after education. Confirmed for patient that bed alarm was turned off and honored his request for door to be closed. Patient stated he would use the call bell if he felt unsafe or wanted anything.

## 2023-02-25 LAB
ABO + RH BLD: NORMAL
BASOPHILS # BLD: 0.1 K/UL (ref 0–0.1)
BASOPHILS NFR BLD: 1 % (ref 0–1)
BLD PROD TYP BPU: NORMAL
BLOOD GROUP ANTIBODIES SERPL: NEGATIVE
BPU ID: NORMAL
CROSSMATCH RESULT,%XM: NORMAL
DIFFERENTIAL METHOD BLD: ABNORMAL
EOSINOPHIL # BLD: 0.6 K/UL (ref 0–0.4)
EOSINOPHIL NFR BLD: 6 % (ref 0–7)
ERYTHROCYTE [DISTWIDTH] IN BLOOD BY AUTOMATED COUNT: 28.4 % (ref 11.5–14.5)
HCT VFR BLD AUTO: 29.4 % (ref 36.6–50.3)
HGB BLD-MCNC: 9.1 G/DL (ref 12.1–17)
IMM GRANULOCYTES # BLD AUTO: 0.2 K/UL
IMM GRANULOCYTES NFR BLD AUTO: 2 %
LYMPHOCYTES # BLD: 1.3 K/UL (ref 0.8–3.5)
LYMPHOCYTES NFR BLD: 13 % (ref 12–49)
MCH RBC QN AUTO: 25.3 PG (ref 26–34)
MCHC RBC AUTO-ENTMCNC: 31 G/DL (ref 30–36.5)
MCV RBC AUTO: 81.7 FL (ref 80–99)
MONOCYTES # BLD: 1.2 K/UL (ref 0–1)
MONOCYTES NFR BLD: 12 % (ref 5–13)
NEUTS SEG # BLD: 6.7 K/UL (ref 1.8–8)
NEUTS SEG NFR BLD: 66 % (ref 32–75)
NRBC # BLD: 0.09 K/UL (ref 0–0.01)
NRBC BLD-RTO: 0.9 PER 100 WBC
PLATELET # BLD AUTO: 128 K/UL (ref 150–400)
RBC # BLD AUTO: 3.6 M/UL (ref 4.1–5.7)
RBC MORPH BLD: ABNORMAL
SPECIMEN EXP DATE BLD: NORMAL
STATUS OF UNIT,%ST: NORMAL
TRANSFUSION STATUS PATIENT QL: NORMAL
UNIT DIVISION, %UDIV: 0
WBC # BLD AUTO: 10.1 K/UL (ref 4.1–11.1)

## 2023-02-25 PROCEDURE — 65660000001 HC RM ICU INTERMED STEPDOWN

## 2023-02-25 PROCEDURE — 36415 COLL VENOUS BLD VENIPUNCTURE: CPT

## 2023-02-25 PROCEDURE — 74011250637 HC RX REV CODE- 250/637: Performed by: INTERNAL MEDICINE

## 2023-02-25 PROCEDURE — 85025 COMPLETE CBC W/AUTO DIFF WBC: CPT

## 2023-02-25 PROCEDURE — 74011250637 HC RX REV CODE- 250/637: Performed by: PSYCHIATRY & NEUROLOGY

## 2023-02-25 PROCEDURE — 74011000250 HC RX REV CODE- 250: Performed by: INTERNAL MEDICINE

## 2023-02-25 RX ADMIN — SODIUM CHLORIDE, PRESERVATIVE FREE 10 ML: 5 INJECTION INTRAVENOUS at 21:18

## 2023-02-25 RX ADMIN — OXYCODONE AND ACETAMINOPHEN 1 TABLET: 5; 325 TABLET ORAL at 08:09

## 2023-02-25 RX ADMIN — OXYCODONE AND ACETAMINOPHEN 1 TABLET: 5; 325 TABLET ORAL at 21:16

## 2023-02-25 RX ADMIN — RISPERIDONE 1 MG: 1 TABLET ORAL at 21:16

## 2023-02-25 RX ADMIN — FERROUS SULFATE TAB 325 MG (65 MG ELEMENTAL FE) 325 MG: 325 (65 FE) TAB at 08:09

## 2023-02-25 RX ADMIN — OXYCODONE AND ACETAMINOPHEN 1 TABLET: 5; 325 TABLET ORAL at 17:21

## 2023-02-25 RX ADMIN — AMITRIPTYLINE HYDROCHLORIDE 25 MG: 50 TABLET, FILM COATED ORAL at 21:16

## 2023-02-25 RX ADMIN — DIAZEPAM 2 MG: 2 TABLET ORAL at 21:17

## 2023-02-25 RX ADMIN — DIAZEPAM 2 MG: 2 TABLET ORAL at 11:02

## 2023-02-25 NOTE — PROGRESS NOTES
Physician Progress Note      Lani Caal  [facility] #:                  [id number]  :                       1960  ADMIT DATE:       2023 12:46 PM  [address] [place] DATE:  RESPONDING  PROVIDER #:        Eugene Samuels MD          QUERY TEXT:    Pt admitted with Severe Anemia & Acute CHF and has malnutrition documented in H&P. Please further specify type of malnutrition with documentation in the medical record. The medical record reflects the following:  Risk Factors: Anemia, CHF, Malnutrition, COPD, Paraplegia  Clinical Indicators: H&P: \"Protein calorie malnutrition. \" BMI 18.25  RD PN: \"Body Fat Loss:  Unable to assess, Muscle Mass Loss:  Unable to assess, Fluid Accumulation:  Unable to assess,  Strength:  Not performed. Malnutrition Status:  No malnutrition. Poor po intake per emar, reported by pt and son. \"  Treatment: Hematology consulted, RD Consulted, monitor and document intake, wts in I/Os. ASPEN Criteria:  https://aspenjournals. onlinelibrary. ortiz. com/doi/full/10.1177/6336636735068233    Thank you,  LANE TorresN, RN, [facility], CDI Specialist  [phone] or [email or web address]  Can also be reached on [employer]  Options provided:  -- Mild Malnutrition  -- Moderate Malnutrition  -- Severe Malnutrition  -- Other - I will add my own diagnosis  -- Disagree - Not applicable / Not valid  -- Disagree - Clinically unable to determine / Unknown  -- Refer to Clinical Documentation Reviewer    PROVIDER RESPONSE TEXT:    This patient has moderate malnutrition.     Query created by: Anthony Pisano on 2023 11:19 PM      Electronically signed by:  Eugene Samuels MD 2023 11:15 AM

## 2023-02-25 NOTE — PROGRESS NOTES
Hematology and Oncology Progress Note    Patient: Neyda Stanton MRN: [id number]  SSN: [id number]    YOB: 1960  Age: 60 y.o. Sex: male      Admit Date: 2/21/2023    LOS: 4 days     Chief Complaint: Patient was admitted with increasing shortness of breath and severe fatigue    Subjective:     Patient is feeling slightly better than yesterday. No nausea or vomiting. No mucosal bleeding. No palpitation or chest pain. Objective:     Vitals:    02/24/23 2059 02/24/23 2230 02/24/23 2300 02/25/23 0838   BP: 112/64 104/72 126/72 118/74   Pulse: 95 86 65 (!) 103   Resp: 20 18 17 16   Temp: 98 °F (36.7 °C) 98.2 °F (36.8 °C) 98.4 °F (36.9 °C) 98.5 °F (36.9 °C)   SpO2: 98% 98%  98%   Weight:       Height:          Physical Exam:  Constitutional: [de-identified] white male looks older for his age. Not in any acute distress or pain. His complexion is significantly pale. Eyes: Sclerae anicteric. Conjunctivae shows severe pallor. ENMT: Oral mucosa is moist, no thrush, mucositis, or petechiae. Neck: No adenopathy   Respiratory: Lungs are clear bilaterally. Cardiovascular: Sinus tachycardia with holosystolic murmur. Abdomen: Soft, nontender, no hepatosplenomegaly. No guarding or rigidity. Bowel sounds present. Extremities: No edema. Skin: No petechiae; no skin rash. Neurologic: Alert/oriented x 3. Has lower extremity weakness.     Lab/Data Review:    Recent Results (from the past 24 hour(s))   CBC WITH AUTOMATED DIFF    Collection Time: 02/25/23  5:01 AM   Result Value Ref Range    WBC 10.1 4.1 - 11.1 K/uL    RBC 3.60 (L) 4.10 - 5.70 M/uL    HGB 9.1 (L) 12.1 - 17.0 g/dL    HCT 29.4 (L) 36.6 - 50.3 %    MCV 81.7 80.0 - 99.0 FL    MCH 25.3 (L) 26.0 - 34.0 PG    MCHC 31.0 30.0 - 36.5 g/dL    RDW 28.4 (H) 11.5 - 14.5 %    PLATELET 036 (L) 614 - 400 K/uL    NRBC 0.9 (H) 0.0  WBC    ABSOLUTE NRBC 0.09 (H) 0.00 - 0.01 K/uL    NEUTROPHILS 66 32 - 75 %    LYMPHOCYTES 13 12 - 49 %    MONOCYTES 12 5 - 13 % EOSINOPHILS 6 0 - 7 %    BASOPHILS 1 0 - 1 %    IMMATURE GRANULOCYTES 2 %    ABS. NEUTROPHILS 6.7 1.8 - 8.0 K/UL    ABS. LYMPHOCYTES 1.3 0.8 - 3.5 K/UL    ABS. MONOCYTES 1.2 (H) 0.0 - 1.0 K/UL    ABS. EOSINOPHILS 0.6 (H) 0.0 - 0.4 K/UL    ABS. BASOPHILS 0.1 0.0 - 0.1 K/UL    ABS. IMM. GRANS. 0.2 K/UL    DF Smear Scanned      RBC COMMENTS Polychromasia  2+        RBC COMMENTS Anisocytosis  3+        RBC COMMENTS Teardrop cells  2+        RBC COMMENTS Schistocytes  3+               Assessment and plan:     25-year-old white male with history of COPD and paraplegia came with shortness of breath and was found to have symptomatic anemia. Patient has no visible bleeding and has no history of previous anemia. Just based on his overall symptomatology I think his anemia must be chronic and must have developed over a long time. He does have anemia symptoms but not as bad as his numbers look. 1) anemia: I definitely agree with packed RBC transfusion. We have started anemia work-up and based on the work-up results we will make further recommendations.  -His peripheral smear on 21 February shows significant hypochromia and microcytosis, occasional nucleated RBCs, schistocytes, fragmented RBCs, occasional teardrop RBCs. There is significant anisocytosis and poikilocytosis is seen. There are no blast cells are seen. 12/21:  I had a very long discussion with patient and his son and daughter regarding his severe anemia and my current impression. We also discussed about further diagnostic plan and explained to them that if noninvasive work-up does not reveal the cause of his anemia then we may even consider doing bone marrow biopsy and aspiration.  -While we are giving him packed RBC transfusion we will have to be careful with fluid overload. Patient may also develop delusional thrombocytopenia because of significant packed RBC transfusion.     12/22: Patient had packed RBC transfusion and his hemoglobin went up to 5.8.  I will hold off any further transfusion today. We will repeat CBC again tomorrow and decide whether he needs any further transfusion. Patient's iron studies shows iron of 11, TIBC 461, iron saturation 2% and ferritin of 4. Patient's vitamin B12 is 1093 and folate is 15.6. These numbers are consistent with iron deficiency anemia. I have started patient on ferrous sulfate and I will get GI evaluation for possible GI blood loss. Case was discussed with attending physician. Case was also discussed with patient's nurse. Explained to patient about his diagnosis and work-up plan. Also did some reassurance and counseling. 12/23: Patient's hemoglobin has improved to 6.7 and hematocrit is 22.7 today. Discussed with patient about possible GI blood loss and GI consult. Patient was somewhat reluctant to go for endoscopy where I have asked him to at least talk to gastroenterologist before making final decision. 12/24: Patient was evaluated by Dr. Deshawn Castaneda from GI and I have reviewed Dr. Shefali Dickerson note and recommendations. Patient will definitely need GI work-up but I will defer timing to the gastroenterologist.  We discussed with patient why it is important to make sure he gets GI work-up. I have reviewed his blood work from today. Patient's hemoglobin was 6.3 today so he is getting packed RBC transfusion. 12/25: Patient's hemoglobin has improved to 9.1 and hematocrit 29.4. No further transfusion. Patient has developed thrombocytopenia with platelet count of 462,400. His thrombocytopenia is most likely from dilutional effect of multiple packed RBC transfusion. GI input appreciated. Case was discussed with patient's nurse. Patient will definitely need GI work-up. I am somewhat worried that patient may not keep his outpatient appointment and wanted GI to consider if they can do inpatient GI work-up. This dictation was done by dragon, computer voice recognition software.   Often unanticipated grammatical, syntax, phones and other interpretive errors are inadvertently transcribed. Please excuse errors that have escaped final proofreading.        Signed By: April Mckinney MD     February 25, 2023

## 2023-02-25 NOTE — PROGRESS NOTES
Hospitalist Progress Note               Daily Progress Note: 2/25/2023      Subjective:   2/25/2023-  Short of breath on exertion. Patient wants endoscopies and full work-up before discharge. 2/24/23-GI recommends outpatient evaluation; transfused 2 units PRBC  Shortness of breath on exertion  Oncology wants patient evaluated for GI bleed prior to discharge  2/23/2023-states that he had a good night of sleep. Feels a lot better. Not as short of breath. Did not cooperative physical therapy. Eating better. 2/22/2023-transfused 3 units PRBCs, reported hallucinations, reported back spasms and leg spasms-escalated need for pain medications. Received intermittent Lasix. Did not appreciate fluid restriction and salt restricted diet  Later seen by psychiatry. Placed him on amitriptyline; Valium for anxiety and risperidone for hallucinations    Hospital course to date:  Cara Paez is a 60 y.o. male who presents with shortness of breath. He had past medical history of COPD and paraplegia. He reports to be experiencing worsening shortness of breath on exertion and must rest to regain his breath. He reports to be having difficult time forming sentences while short of breath. He reports chronic pain in his left axillae, left lower extremity and low back at the level of t10-l1 which apparently is the same level of his spinal damage. He is not very mobile and uses a wheel chair when not using leg bracing. He has history of amputations of the toes.            Problem List:  Problem List as of 2/25/2023 Never Reviewed            Codes Class Noted - Resolved    Severe anemia ICD-10-CM: D64.9  ICD-9-CM: 285.9  2/21/2023 - Present           Medications reviewed  Current Facility-Administered Medications   Medication Dose Route Frequency    0.9% sodium chloride infusion 250 mL  250 mL IntraVENous PRN    oxyCODONE-acetaminophen (PERCOCET) 5-325 mg per tablet 1 Tablet  1 Tablet Oral Q6H PRN    amitriptyline (ELAVIL) tablet 25 mg  25 mg Oral QHS    diazePAM (VALIUM) tablet 2 mg  2 mg Oral Q6H PRN    risperiDONE (RisperDAL) tablet 1 mg  1 mg Oral QHS    ferrous sulfate tablet 325 mg  1 Tablet Oral DAILY WITH BREAKFAST    0.9% sodium chloride infusion 250 mL  250 mL IntraVENous PRN    sodium chloride (NS) flush 5-40 mL  5-40 mL IntraVENous Q8H    sodium chloride (NS) flush 5-40 mL  5-40 mL IntraVENous PRN    acetaminophen (TYLENOL) tablet 650 mg  650 mg Oral Q6H PRN    Or    acetaminophen (TYLENOL) suppository 650 mg  650 mg Rectal Q6H PRN    polyethylene glycol (MIRALAX) packet 17 g  17 g Oral DAILY PRN    promethazine (PHENERGAN) tablet 12.5 mg  12.5 mg Oral Q6H PRN    Or    ondansetron (ZOFRAN) injection 4 mg  4 mg IntraVENous Q6H PRN    melatonin tablet 6 mg  6 mg Oral QHS PRN    lidocaine 4 % patch 2 Patch  2 Patch TransDERmal Q12H PRN       Review of Systems:   Pertinent items are noted in HPI. Objective:   Physical Exam:     Visit Vitals  /74 (BP 1 Location: Right upper arm, BP Patient Position: Sitting)   Pulse (!) 103   Temp 98.5 °F (36.9 °C)   Resp 16   Ht 5' 8\" (1.727 m)   Wt 54.4 kg (120 lb)   SpO2 98%   BMI 18.25 kg/m²      O2 Device: None (Room air)    Temp (24hrs), Av.8 °F (37.1 °C), Min:97.9 °F (36.6 °C), Max:100.2 °F (37.9 °C)    No intake/output data recorded.  1901 -  0700  In: 625.4   Out: -     General:   Awake and alert   Lungs:   Clear to auscultation bilaterally. Chest wall:  No tenderness or deformity. Heart:  Regular rate and rhythm, S1, S2 normal, no murmur, click, rub or gallop. Abdomen:   Soft, non-tender. Bowel sounds normal. No masses,  No organomegaly. Extremities: Extremities normal, atraumatic, no cyanosis or edema. Pulses: 2+ and symmetric all extremities. Skin: Skin color, texture, turgor normal. No rashes or lesions   Neurologic: CNII-XII intact.   No gross focal deficits    No gross lymphadenopathy          Data Review:       Recent Days:  Recent Labs     02/25/23  0501 02/24/23  0414 02/23/23  0810   WBC 10.1 9.3 7.6   HGB 9.1* 6.3* 6.7*   HCT 29.4* 21.2* 22.7*   * 158 193     No results for input(s): NA, K, CL, CO2, GLU, BUN, CREA, CA, MG, PHOS, ALB, TBIL, TBILI, ALT, INR, INREXT, INREXT in the last 72 hours. No lab exists for component: SGOT    No results for input(s): PH, PCO2, PO2, HCO3, FIO2 in the last 72 hours. 24 Hour Results:  Recent Results (from the past 24 hour(s))   CBC WITH AUTOMATED DIFF    Collection Time: 02/25/23  5:01 AM   Result Value Ref Range    WBC 10.1 4.1 - 11.1 K/uL    RBC 3.60 (L) 4.10 - 5.70 M/uL    HGB 9.1 (L) 12.1 - 17.0 g/dL    HCT 29.4 (L) 36.6 - 50.3 %    MCV 81.7 80.0 - 99.0 FL    MCH 25.3 (L) 26.0 - 34.0 PG    MCHC 31.0 30.0 - 36.5 g/dL    RDW 28.4 (H) 11.5 - 14.5 %    PLATELET 464 (L) 468 - 400 K/uL    NRBC 0.9 (H) 0.0  WBC    ABSOLUTE NRBC 0.09 (H) 0.00 - 0.01 K/uL    NEUTROPHILS 66 32 - 75 %    LYMPHOCYTES 13 12 - 49 %    MONOCYTES 12 5 - 13 %    EOSINOPHILS 6 0 - 7 %    BASOPHILS 1 0 - 1 %    IMMATURE GRANULOCYTES 2 %    ABS. NEUTROPHILS 6.7 1.8 - 8.0 K/UL    ABS. LYMPHOCYTES 1.3 0.8 - 3.5 K/UL    ABS. MONOCYTES 1.2 (H) 0.0 - 1.0 K/UL    ABS. EOSINOPHILS 0.6 (H) 0.0 - 0.4 K/UL    ABS. BASOPHILS 0.1 0.0 - 0.1 K/UL    ABS. IMM. GRANS. 0.2 K/UL    DF Smear Scanned      RBC COMMENTS Polychromasia  2+        RBC COMMENTS Anisocytosis  3+        RBC COMMENTS Teardrop cells  2+        RBC COMMENTS Schistocytes  3+           CTA CHEST W OR W WO CONT   Final Result      1. No evidence of pulmonary embolism. 2. Mild pulmonary edema in the lower lobes. Trace right pleural effusion. Trace   perihepatic ascites. 3. Cardiomegaly. Enlarged pulmonary arteries, consistent with pulmonary arterial   hypertension. 4. Additional incidental findings as above. XR CHEST SNGL V   Final Result   Central pulmonary vascular congestion versus perihilar opacities   that can be seen with a viral process. Assessment:    Severe chronic anemia-s/p blood transfusion with iron deficiency; resolved. Hemoglobin is now 9.3  Acute CHF, high-output secondary to severe anemia; symptom control better  History of major depression-followed by psychiatry and initiated on medications  Recurrent insomnia-initiated on medications  Protein calorie malnutrition  Paraparesis lower extremities  Bilateral foot drop  Indwelling Hardy catheter  High lumbar low thoracic spinal fusion  Noncompliance      Discussion/MDM: Patient with multiple medical comorbidities, each with high likelihood for morbidity and mortality if left untreated. Patient being treated with medication that requires intensive monitoring due to increased risk for toxicity. I have reviewed patient's presenting subjective and objective findings, as well as all laboratory studies, imaging studies, and vital signs to date as well as treatment rendered and patient's response to those treatments. In addition, prior medical, surgical and relevant social and family histories were reviewed. Discussed with   Patient expressively wants endoscopies and full evaluation before being discharged. He states he does not have the means to follow-up outpatient being a paraplegic and having no social support    Plan: We will request GI to do procedure while inpatient as per patient's wishes and oncology recommendations. We will track hemoglobin  Iron supplementation      Care Plan discussed with: Patient/Family    Code status: Full    Social determinants of health:    Disposition:     Total time spent with patient: 39 minutes.     Caitlyn Rosenberg MD

## 2023-02-25 NOTE — PROGRESS NOTES
Hematology and Oncology Progress Note    Patient: Kathy Saini MRN: [id number]  SSN: [id number]    YOB: 1960  Age: 60 y.o. Sex: male      Admit Date: 2/21/2023    LOS: 3 days     Chief Complaint: Patient was admitted with increasing shortness of breath and severe fatigue    Subjective:     Patient is very upset and agitated. He was not happy that they are not doing his endoscopy while he is in inpatient. .  Patient is feeling tired and has some dyspnea on exertion. He has no mucosal bleeding. .    Objective:     Vitals:    02/24/23 1930 02/24/23 1943 02/24/23 2014 02/24/23 2059   BP: 118/74 120/63 126/72 112/64   Pulse: 88 93 98 95   Resp: 20 20 22 20   Temp: 98.9 °F (37.2 °C) 100.2 °F (37.9 °C) 98.5 °F (36.9 °C) 98 °F (36.7 °C)   SpO2: 99% 96% 98% 98%   Weight:       Height:          Physical Exam:  Constitutional: [de-identified] white male looks older for his age. Not in any acute distress or pain. His complexion is significantly pale. Eyes: Sclerae anicteric. Conjunctivae shows severe pallor. ENMT: Oral mucosa is moist, no thrush, mucositis, or petechiae. Neck: No adenopathy   Respiratory: Lungs are clear bilaterally. Cardiovascular: Sinus tachycardia with holosystolic murmur. Abdomen: Soft, nontender, no hepatosplenomegaly. No guarding or rigidity. Bowel sounds present. Extremities: No edema. Skin: No petechiae; no skin rash. Neurologic: Alert/oriented x 3. Has lower extremity weakness.     Lab/Data Review:    Recent Results (from the past 24 hour(s))   CBC WITH AUTOMATED DIFF    Collection Time: 02/24/23  4:14 AM   Result Value Ref Range    WBC 9.3 4.1 - 11.1 K/uL    RBC 2.78 (L) 4.10 - 5.70 M/uL    HGB 6.3 (L) 12.1 - 17.0 g/dL    HCT 21.2 (L) 36.6 - 50.3 %    MCV 76.3 (L) 80.0 - 99.0 FL    MCH 22.7 (L) 26.0 - 34.0 PG    MCHC 29.7 (L) 30.0 - 36.5 g/dL    PLATELET 385 167 - 800 K/uL    MPV 10.0 8.9 - 12.9 FL    NRBC 1.4 (H) 0.0  WBC    ABSOLUTE NRBC 0.13 (H) 0.00 - 0.01 K/uL NEUTROPHILS 69 32 - 75 %    BAND NEUTROPHILS 1 0 - 6 %    LYMPHOCYTES 18 12 - 49 %    MONOCYTES 6 5 - 13 %    EOSINOPHILS 1 0 - 7 %    BASOPHILS 1 0 - 1 %    MYELOCYTES 2 (H) 0 %    OTHER CELL 2 %    IMMATURE GRANULOCYTES 0 %    ABS. NEUTROPHILS 6.5 1.8 - 8.0 K/UL    ABS. LYMPHOCYTES 1.7 0.8 - 3.5 K/UL    ABS. MONOCYTES 0.6 0.0 - 1.0 K/UL    ABS. EOSINOPHILS 0.1 0.0 - 0.4 K/UL    ABS. BASOPHILS 0.1 0.0 - 0.1 K/UL    ABS. IMM. GRANS. 0.0 K/UL    DF Manual      RBC COMMENTS Anisocytosis  2+        RBC COMMENTS Microcytosis  2+        RBC COMMENTS Hypochromia  2+        RBC COMMENTS Polychromasia  2+               Assessment and plan:     58-year-old white male with history of COPD and paraplegia came with shortness of breath and was found to have symptomatic anemia. Patient has no visible bleeding and has no history of previous anemia. Just based on his overall symptomatology I think his anemia must be chronic and must have developed over a long time. He does have anemia symptoms but not as bad as his numbers look. 1) anemia: I definitely agree with packed RBC transfusion. We have started anemia work-up and based on the work-up results we will make further recommendations.  -His peripheral smear on 21 February shows significant hypochromia and microcytosis, occasional nucleated RBCs, schistocytes, fragmented RBCs, occasional teardrop RBCs. There is significant anisocytosis and poikilocytosis is seen. There are no blast cells are seen. 12/21:  I had a very long discussion with patient and his son and daughter regarding his severe anemia and my current impression. We also discussed about further diagnostic plan and explained to them that if noninvasive work-up does not reveal the cause of his anemia then we may even consider doing bone marrow biopsy and aspiration.  -While we are giving him packed RBC transfusion we will have to be careful with fluid overload.   Patient may also develop delusional thrombocytopenia because of significant packed RBC transfusion. 12/22: Patient had packed RBC transfusion and his hemoglobin went up to 5.8. I will hold off any further transfusion today. We will repeat CBC again tomorrow and decide whether he needs any further transfusion. Patient's iron studies shows iron of 11, TIBC 461, iron saturation 2% and ferritin of 4. Patient's vitamin B12 is 1093 and folate is 15.6. These numbers are consistent with iron deficiency anemia. I have started patient on ferrous sulfate and I will get GI evaluation for possible GI blood loss. Case was discussed with attending physician. Case was also discussed with patient's nurse. Explained to patient about his diagnosis and work-up plan. Also did some reassurance and counseling. 12/23: Patient's hemoglobin has improved to 6.7 and hematocrit is 22.7 today. Discussed with patient about possible GI blood loss and GI consult. Patient was somewhat reluctant to go for endoscopy where I have asked him to at least talk to gastroenterologist before making final decision. 12/24: Patient was evaluated by Dr. Sienna Matt from GI and I have reviewed Dr. Pat Moura note and recommendations. Patient will definitely need GI work-up but I will defer timing to the gastroenterologist.  We discussed with patient why it is important to make sure he gets GI work-up. I have reviewed his blood work from today. Patient's hemoglobin was 6.3 today so he is getting packed RBC transfusion. This dictation was done by dragon, computer voice recognition software. Often unanticipated grammatical, syntax, phones and other interpretive errors are inadvertently transcribed. Please excuse errors that have escaped final proofreading.        Signed By: Basia Thomas MD     February 24, 2023

## 2023-02-25 NOTE — PROGRESS NOTES
Progress Note    Patient: Terence Sneed MRN: [id number]  SSN: [id number]    YOB: 1960  Age: 60 y.o. Sex: male      Admit Date: 2/21/2023    LOS: 4 days     Subjective:   Felt better after transfusion  No past medical history on file.      Current Facility-Administered Medications:     0.9% sodium chloride infusion 250 mL, 250 mL, IntraVENous, PRN, Silvia Connolly MD    oxyCODONE-acetaminophen (PERCOCET) 5-325 mg per tablet 1 Tablet, 1 Tablet, Oral, Q6H PRN, Nerissa Parnell MD, 1 Tablet at 02/25/23 0809    amitriptyline (ELAVIL) tablet 25 mg, 25 mg, Oral, QHS, Familia Brown MD, 25 mg at 02/24/23 2104    diazePAM (VALIUM) tablet 2 mg, 2 mg, Oral, Q6H PRN, Mariya Santos MD, 2 mg at 02/25/23 1102    risperiDONE (RisperDAL) tablet 1 mg, 1 mg, Oral, QHS, Mariya Santos MD, 1 mg at 02/24/23 2104    ferrous sulfate tablet 325 mg, 1 Tablet, Oral, DAILY WITH BREAKFAST, Joel Amanda MD, 325 mg at 02/25/23 0809    0.9% sodium chloride infusion 250 mL, 250 mL, IntraVENous, PRN, Alan Quintanilla MD    sodium chloride (NS) flush 5-40 mL, 5-40 mL, IntraVENous, Q8H, Betsy Contreras MD, 10 mL at 02/24/23 1414    sodium chloride (NS) flush 5-40 mL, 5-40 mL, IntraVENous, PRN, Dorys Contreras MD    acetaminophen (TYLENOL) tablet 650 mg, 650 mg, Oral, Q6H PRN, 650 mg at 02/21/23 2247 **OR** acetaminophen (TYLENOL) suppository 650 mg, 650 mg, Rectal, Q6H PRN, Dorys Contreras MD    polyethylene glycol (MIRALAX) packet 17 g, 17 g, Oral, DAILY PRN, Dorys Contreras MD    promethazine (PHENERGAN) tablet 12.5 mg, 12.5 mg, Oral, Q6H PRN **OR** ondansetron (ZOFRAN) injection 4 mg, 4 mg, IntraVENous, Q6H PRN, Betsy Contreras MD    melatonin tablet 6 mg, 6 mg, Oral, QHS PRN, Desiree Vera MD, 6 mg at 02/21/23 2247    lidocaine 4 % patch 2 Patch, 2 Patch, TransDERmal, Q12H PRN, Desiree Vera MD, 1 Patch at 02/24/23 2104    Objective:     Vitals:    02/24/23 2059 02/24/23 2230 02/24/23 2306 02/25/23 0838   BP: 112/64 104/72 126/72 118/74   Pulse: 95 86 65 (!) 103   Resp: 20 18 17 16   Temp: 98 °F (36.7 °C) 98.2 °F (36.8 °C) 98.4 °F (36.9 °C) 98.5 °F (36.9 °C)   SpO2: 98% 98%  98%   Weight:       Height:            Intake and Output:  Current Shift: No intake/output data recorded. Last three shifts: 02/23 1901 - 02/25 0700  In: 625.4   Out: -     Physical Exam:   Physical Exam  Constitutional:       Appearance: He is ill-appearing. Cardiovascular:      Rate and Rhythm: Rhythm irregular. Pulmonary:      Breath sounds: Normal breath sounds. Abdominal:      Palpations: Abdomen is soft. Musculoskeletal:      Cervical back: Neck supple. Neurological:      General: No focal deficit present. Lab/Data Review:  Recent Results (from the past 24 hour(s))   CBC WITH AUTOMATED DIFF    Collection Time: 02/25/23  5:01 AM   Result Value Ref Range    WBC 10.1 4.1 - 11.1 K/uL    RBC 3.60 (L) 4.10 - 5.70 M/uL    HGB 9.1 (L) 12.1 - 17.0 g/dL    HCT 29.4 (L) 36.6 - 50.3 %    MCV 81.7 80.0 - 99.0 FL    MCH 25.3 (L) 26.0 - 34.0 PG    MCHC 31.0 30.0 - 36.5 g/dL    RDW 28.4 (H) 11.5 - 14.5 %    PLATELET 312 (L) 796 - 400 K/uL    NRBC 0.9 (H) 0.0  WBC    ABSOLUTE NRBC 0.09 (H) 0.00 - 0.01 K/uL    NEUTROPHILS 66 32 - 75 %    LYMPHOCYTES 13 12 - 49 %    MONOCYTES 12 5 - 13 %    EOSINOPHILS 6 0 - 7 %    BASOPHILS 1 0 - 1 %    IMMATURE GRANULOCYTES 2 %    ABS. NEUTROPHILS 6.7 1.8 - 8.0 K/UL    ABS. LYMPHOCYTES 1.3 0.8 - 3.5 K/UL    ABS. MONOCYTES 1.2 (H) 0.0 - 1.0 K/UL    ABS. EOSINOPHILS 0.6 (H) 0.0 - 0.4 K/UL    ABS. BASOPHILS 0.1 0.0 - 0.1 K/UL    ABS. IMM. GRANS. 0.2 K/UL    DF Smear Scanned      RBC COMMENTS Polychromasia  2+        RBC COMMENTS Anisocytosis  3+        RBC COMMENTS Teardrop cells  2+        RBC COMMENTS Schistocytes  3+            CTA CHEST W OR W WO CONT   Final Result      1. No evidence of pulmonary embolism. 2. Mild pulmonary edema in the lower lobes. Trace right pleural effusion. Trace   perihepatic ascites. 3. Cardiomegaly. Enlarged pulmonary arteries, consistent with pulmonary arterial   hypertension. 4. Additional incidental findings as above. XR CHEST SNGL V   Final Result   Central pulmonary vascular congestion versus perihilar opacities   that can be seen with a viral process. Assessment:     Active Problems:    Severe anemia (2/21/2023)    Obscure GI blood loss, stool test was not done, no reported melena, hematochezia     Vascular weakness shortness of breath, indicating symptomatic anemia    Microcytic, hypochromic, iron deficiency,    Plan:   Continue hemoglobin closely,  Stool test as needed for Hemoccult pending   Iron replacement  Outpatient EGD colonoscopy study,  Findings, plan discussed with patient        This dictation was done by dragon, computer voice recognition software. Often unanticipated grammatical, syntax, phones and other interpretive errors are inadvertently transcribed. Please excuse errors that have escaped final proofreading.     Signed By: Elliot Omer MD     February 25, 2023        Thank you for allowing me to participate in this patients care  Cc Referring Physician   None

## 2023-02-26 PROCEDURE — 74011000250 HC RX REV CODE- 250: Performed by: INTERNAL MEDICINE

## 2023-02-26 PROCEDURE — 74011250637 HC RX REV CODE- 250/637: Performed by: INTERNAL MEDICINE

## 2023-02-26 PROCEDURE — 74011250637 HC RX REV CODE- 250/637: Performed by: PSYCHIATRY & NEUROLOGY

## 2023-02-26 PROCEDURE — 65660000001 HC RM ICU INTERMED STEPDOWN

## 2023-02-26 RX ADMIN — FERROUS SULFATE TAB 325 MG (65 MG ELEMENTAL FE) 325 MG: 325 (65 FE) TAB at 09:31

## 2023-02-26 RX ADMIN — DIAZEPAM 2 MG: 2 TABLET ORAL at 21:17

## 2023-02-26 RX ADMIN — OXYCODONE AND ACETAMINOPHEN 1 TABLET: 5; 325 TABLET ORAL at 06:35

## 2023-02-26 RX ADMIN — OXYCODONE AND ACETAMINOPHEN 1 TABLET: 5; 325 TABLET ORAL at 12:35

## 2023-02-26 RX ADMIN — AMITRIPTYLINE HYDROCHLORIDE 25 MG: 50 TABLET, FILM COATED ORAL at 21:07

## 2023-02-26 RX ADMIN — SODIUM CHLORIDE, PRESERVATIVE FREE 10 ML: 5 INJECTION INTRAVENOUS at 21:07

## 2023-02-26 RX ADMIN — OXYCODONE AND ACETAMINOPHEN 1 TABLET: 5; 325 TABLET ORAL at 21:17

## 2023-02-26 RX ADMIN — DIAZEPAM 2 MG: 2 TABLET ORAL at 12:35

## 2023-02-26 RX ADMIN — DIAZEPAM 2 MG: 2 TABLET ORAL at 06:35

## 2023-02-26 RX ADMIN — RISPERIDONE 1 MG: 1 TABLET ORAL at 21:07

## 2023-02-26 RX ADMIN — SODIUM CHLORIDE, PRESERVATIVE FREE 10 ML: 5 INJECTION INTRAVENOUS at 13:21

## 2023-02-26 NOTE — PROGRESS NOTES
Hematology and Oncology Progress Note    Patient: Preethi Monzon MRN: [id number]  SSN: [id number]    YOB: 1960  Age: 60 y.o. Sex: male      Admit Date: 2/21/2023    LOS: 5 days     Chief Complaint: Patient was admitted with increasing shortness of breath and severe fatigue    Subjective:     Patient is feeling tired. Does have some dyspnea. He is wondering about his GI work-up. Objective:     Vitals:    02/25/23 1647 02/25/23 2054 02/26/23 0534 02/26/23 0827   BP: 114/71 136/76 96/65 122/78   Pulse: (!) 104 (!) 101 (!) 102 94   Resp: 16 16 16 16   Temp: 97.5 °F (36.4 °C) 98 °F (36.7 °C) 98.6 °F (37 °C) 98.2 °F (36.8 °C)   SpO2: 98% 100% 98% 97%   Weight:       Height:          Physical Exam:  Constitutional: [de-identified] white male looks older for his age. Not in any acute distress or pain. His complexion is significantly pale. Eyes: Sclerae anicteric. Conjunctivae shows severe pallor. ENMT: Oral mucosa is moist, no thrush, mucositis, or petechiae. Neck: No adenopathy   Respiratory: Lungs are clear bilaterally. Cardiovascular: Sinus tachycardia with holosystolic murmur. Abdomen: Soft, nontender, no hepatosplenomegaly. No guarding or rigidity. Bowel sounds present. Extremities: No edema. Skin: No petechiae; no skin rash. Neurologic: Alert/oriented x 3. Has lower extremity weakness. Lab/Data Review:    No results found for this or any previous visit (from the past 24 hour(s)). Assessment and plan:     27-year-old white male with history of COPD and paraplegia came with shortness of breath and was found to have symptomatic anemia. Patient has no visible bleeding and has no history of previous anemia. Just based on his overall symptomatology I think his anemia must be chronic and must have developed over a long time. He does have anemia symptoms but not as bad as his numbers look. 1) anemia: I definitely agree with packed RBC transfusion.   We have started anemia work-up and based on the work-up results we will make further recommendations.  -His peripheral smear on 21 February shows significant hypochromia and microcytosis, occasional nucleated RBCs, schistocytes, fragmented RBCs, occasional teardrop RBCs. There is significant anisocytosis and poikilocytosis is seen. There are no blast cells are seen. 12/21:  I had a very long discussion with patient and his son and daughter regarding his severe anemia and my current impression. We also discussed about further diagnostic plan and explained to them that if noninvasive work-up does not reveal the cause of his anemia then we may even consider doing bone marrow biopsy and aspiration.  -While we are giving him packed RBC transfusion we will have to be careful with fluid overload. Patient may also develop delusional thrombocytopenia because of significant packed RBC transfusion. 12/22: Patient had packed RBC transfusion and his hemoglobin went up to 5.8. I will hold off any further transfusion today. We will repeat CBC again tomorrow and decide whether he needs any further transfusion. Patient's iron studies shows iron of 11, TIBC 461, iron saturation 2% and ferritin of 4. Patient's vitamin B12 is 1093 and folate is 15.6. These numbers are consistent with iron deficiency anemia. I have started patient on ferrous sulfate and I will get GI evaluation for possible GI blood loss. Case was discussed with attending physician. Case was also discussed with patient's nurse. Explained to patient about his diagnosis and work-up plan. Also did some reassurance and counseling. 12/23: Patient's hemoglobin has improved to 6.7 and hematocrit is 22.7 today. Discussed with patient about possible GI blood loss and GI consult. Patient was somewhat reluctant to go for endoscopy where I have asked him to at least talk to gastroenterologist before making final decision.     12/24: Patient was evaluated by Dr. Aiden Oquendo from GI and I have reviewed Dr. Sancho Young note and recommendations. Patient will definitely need GI work-up but I will defer timing to the gastroenterologist.  We discussed with patient why it is important to make sure he gets GI work-up. I have reviewed his blood work from today. Patient's hemoglobin was 6.3 today so he is getting packed RBC transfusion. 12/25: Patient's hemoglobin has improved to 9.1 and hematocrit 29.4. No further transfusion. Patient has developed thrombocytopenia with platelet count of 794,020. His thrombocytopenia is most likely from dilutional effect of multiple packed RBC transfusion. GI input appreciated. Case was discussed with patient's nurse. Patient will definitely need GI work-up. I am somewhat worried that patient may not keep his outpatient appointment and wanted GI to consider if they can do inpatient GI work-up. 12/26: Discussed with patient about his iron deficiency anemia and possible causes. Patient is undergoing further GI work-up. His H&H looks stable as per his CBC yesterday. I will repeat CBC tomorrow to make sure his platelet counts are stable and his hemoglobin is stable. Patient can start on oral iron once his GI work-up is done. I have discussed with patient about IV iron but he does not want to go with IV iron when I discussed with him about side effects including possible allergic or anaphylactic reaction. Hopefully he will take his oral iron and response to it. This dictation was done by dragon, computer voice recognition software. Often unanticipated grammatical, syntax, phones and other interpretive errors are inadvertently transcribed. Please excuse errors that have escaped final proofreading.        Signed By: Fidel Modi MD     February 26, 2023

## 2023-02-26 NOTE — PROGRESS NOTES
Bedside and Verbal shift change report given to Renu Salomon (oncoming nurse) by Abigail Azul (offgoing nurse).  Report included the following information SBAR reent results

## 2023-02-26 NOTE — PROGRESS NOTES
IV leaking and patient concerned about blood loss, I didn't notice any significant blood loss, only a spot of blood on the bottom sheet. Removed IV. Patient refuses to have another IV insertion at this time. Patient educated on importance of having an IV line while being admitted in the hospital. Will continue to monitor and let day shift nurse know.

## 2023-02-26 NOTE — PROGRESS NOTES
Problem: Pressure Injury - Risk of  Goal: *Prevention of pressure injury  Description: Document Jose Carlos Scale and appropriate interventions in the flowsheet. Outcome: Progressing Towards Goal  Note: Pressure Injury Interventions:  Sensory Interventions: Pressure redistribution bed/mattress (bed type)    Moisture Interventions: Absorbent underpads, Apply protective barrier, creams and emollients, Internal/External urinary devices    Activity Interventions: Pressure redistribution bed/mattress(bed type), Increase time out of bed    Mobility Interventions: Pressure redistribution bed/mattress (bed type), HOB 30 degrees or less    Nutrition Interventions: Document food/fluid/supplement intake    Friction and Shear Interventions: Apply protective barrier, creams and emollients                Problem: Patient Education: Go to Patient Education Activity  Goal: Patient/Family Education  Outcome: Progressing Towards Goal     Problem: Discharge Planning  Goal: *Discharge to safe environment  Outcome: Progressing Towards Goal  Goal: *Knowledge of medication management  Outcome: Progressing Towards Goal  Goal: *Knowledge of discharge instructions  Outcome: Progressing Towards Goal     Problem: Patient Education: Go to Patient Education Activity  Goal: Patient/Family Education  Outcome: Progressing Towards Goal     Problem: Falls - Risk of  Goal: *Absence of Falls  Description: Document Noa Fall Risk and appropriate interventions in the flowsheet.   Outcome: Progressing Towards Goal  Note: Fall Risk Interventions:  Mobility Interventions: Bed/chair exit alarm         Medication Interventions: Bed/chair exit alarm                   Problem: Patient Education: Go to Patient Education Activity  Goal: Patient/Family Education  Outcome: Progressing Towards Goal     Problem: Anemia Care Plan (Adult and Pediatric)  Goal: *Labs within defined limits  Outcome: Progressing Towards Goal  Goal: *Tolerates increased activity  Outcome: Progressing Towards Goal     Problem: Patient Education: Go to Patient Education Activity  Goal: Patient/Family Education  Outcome: Progressing Towards Goal

## 2023-02-26 NOTE — PROGRESS NOTES
Hospitalist Progress Note               Daily Progress Note: 2/26/2023      Subjective:     2/26/2023-no new complaints. Eating well. Not short of breath. Limited activity. 2/25/2023-  Short of breath on exertion. Patient wants endoscopies and full work-up before discharge. 2/24/23-GI recommends outpatient evaluation; transfused 2 units PRBC  Shortness of breath on exertion  Oncology wants patient evaluated for GI bleed prior to discharge  2/23/2023-states that he had a good night of sleep. Feels a lot better. Not as short of breath. Did not cooperative physical therapy. Eating better. 2/22/2023-transfused 3 units PRBCs, reported hallucinations, reported back spasms and leg spasms-escalated need for pain medications. Received intermittent Lasix. Did not appreciate fluid restriction and salt restricted diet  Later seen by psychiatry. Placed him on amitriptyline; Valium for anxiety and risperidone for hallucinations    Hospital course to date:  Winter Snider is a 60 y.o. male who presents with shortness of breath. He had past medical history of COPD and paraplegia. He reports to be experiencing worsening shortness of breath on exertion and must rest to regain his breath. He reports to be having difficult time forming sentences while short of breath. He reports chronic pain in his left axillae, left lower extremity and low back at the level of t10-l1 which apparently is the same level of his spinal damage. He is not very mobile and uses a wheel chair when not using leg bracing. He has history of amputations of the toes.            Problem List:  Problem List as of 2/26/2023 Never Reviewed            Codes Class Noted - Resolved    Severe anemia ICD-10-CM: D64.9  ICD-9-CM: 285.9  2/21/2023 - Present           Medications reviewed  Current Facility-Administered Medications   Medication Dose Route Frequency    0.9% sodium chloride infusion 250 mL  250 mL IntraVENous PRN oxyCODONE-acetaminophen (PERCOCET) 5-325 mg per tablet 1 Tablet  1 Tablet Oral Q6H PRN    amitriptyline (ELAVIL) tablet 25 mg  25 mg Oral QHS    diazePAM (VALIUM) tablet 2 mg  2 mg Oral Q6H PRN    risperiDONE (RisperDAL) tablet 1 mg  1 mg Oral QHS    ferrous sulfate tablet 325 mg  1 Tablet Oral DAILY WITH BREAKFAST    0.9% sodium chloride infusion 250 mL  250 mL IntraVENous PRN    sodium chloride (NS) flush 5-40 mL  5-40 mL IntraVENous Q8H    sodium chloride (NS) flush 5-40 mL  5-40 mL IntraVENous PRN    acetaminophen (TYLENOL) tablet 650 mg  650 mg Oral Q6H PRN    Or    acetaminophen (TYLENOL) suppository 650 mg  650 mg Rectal Q6H PRN    polyethylene glycol (MIRALAX) packet 17 g  17 g Oral DAILY PRN    promethazine (PHENERGAN) tablet 12.5 mg  12.5 mg Oral Q6H PRN    Or    ondansetron (ZOFRAN) injection 4 mg  4 mg IntraVENous Q6H PRN    melatonin tablet 6 mg  6 mg Oral QHS PRN    lidocaine 4 % patch 2 Patch  2 Patch TransDERmal Q12H PRN       Review of Systems:   Pertinent items are noted in HPI. Objective:   Physical Exam:     Visit Vitals  /78 (BP 1 Location: Right upper arm, BP Patient Position: Sitting)   Pulse 94   Temp 98.2 °F (36.8 °C)   Resp 16   Ht 5' 8\" (1.727 m)   Wt 54.4 kg (120 lb)   SpO2 97%   BMI 18.25 kg/m²      O2 Device: None (Room air)    Temp (24hrs), Av.1 °F (36.7 °C), Min:97.5 °F (36.4 °C), Max:98.6 °F (37 °C)    No intake/output data recorded.  1901 -  0700  In: 625.4   Out: -     General:   Awake and alert   Lungs:   Clear to auscultation bilaterally. Chest wall:  No tenderness or deformity. Heart:  Regular rate and rhythm, S1, S2 normal, no murmur, click, rub or gallop. Bibasilar crackles   Abdomen:   Soft, non-tender. Bowel sounds normal. No masses,  No organomegaly. Extremities: Extremities normal, atraumatic, no cyanosis or edema. Pulses: 2+ and symmetric all extremities.    Skin: Skin color, texture, turgor normal. No rashes or lesions   Neurologic: CNII-XII intact. No gross focal deficits    No gross lymphadenopathy          Data Review:       Recent Days:  Recent Labs     02/25/23  0501 02/24/23  0414   WBC 10.1 9.3   HGB 9.1* 6.3*   HCT 29.4* 21.2*   * 158     No results for input(s): NA, K, CL, CO2, GLU, BUN, CREA, CA, MG, PHOS, ALB, TBIL, TBILI, ALT, INR, INREXT, INREXT in the last 72 hours. No lab exists for component: SGOT    No results for input(s): PH, PCO2, PO2, HCO3, FIO2 in the last 72 hours. 24 Hour Results:  No results found for this or any previous visit (from the past 24 hour(s)). CTA CHEST W OR W WO CONT   Final Result      1. No evidence of pulmonary embolism. 2. Mild pulmonary edema in the lower lobes. Trace right pleural effusion. Trace   perihepatic ascites. 3. Cardiomegaly. Enlarged pulmonary arteries, consistent with pulmonary arterial   hypertension. 4. Additional incidental findings as above. XR CHEST SNGL V   Final Result   Central pulmonary vascular congestion versus perihilar opacities   that can be seen with a viral process. Assessment:    Severe chronic anemia-s/p blood transfusion with iron deficiency; resolved. Hemoglobin is now 9.3  Acute CHF, high-output secondary to severe anemia; symptom control better  History of major depression-followed by psychiatry and initiated on medications  Recurrent insomnia-initiated on medications  Protein calorie malnutrition  Paraparesis lower extremities  Bilateral foot drop  High lumbar low thoracic spinal fusion  Noncompliance      Discussion/MDM: Patient with multiple medical comorbidities, each with high likelihood for morbidity and mortality if left untreated. Patient being treated with medication that requires intensive monitoring due to increased risk for toxicity.  I have reviewed patient's presenting subjective and objective findings, as well as all laboratory studies, imaging studies, and vital signs to date as well as treatment rendered and patient's response to those treatments. In addition, prior medical, surgical and relevant social and family histories were reviewed. Prior to this, Patient expressively wants endoscopies and full evaluation before being discharged. He states he does not have the means to follow-up outpatient being a paraplegic and having no social support    Plan:  GI follow-up  Appreciate oncology opinion  May need bowel prep as  and when indicated by GI      Care Plan discussed with: Patient/Family    Code status: Full    Social determinants of health:    Disposition:     Total time spent with patient: 20 minutes.     Angelina Sanders MD

## 2023-02-27 ENCOUNTER — APPOINTMENT (OUTPATIENT)
Dept: ENDOSCOPY | Age: 63
DRG: 377 | End: 2023-02-27
Attending: INTERNAL MEDICINE
Payer: MEDICARE

## 2023-02-27 LAB
BASOPHILS # BLD: 0.2 K/UL (ref 0–0.1)
BASOPHILS NFR BLD: 2 % (ref 0–1)
DIFFERENTIAL METHOD BLD: ABNORMAL
EOSINOPHIL # BLD: 0.6 K/UL (ref 0–0.4)
EOSINOPHIL NFR BLD: 7 % (ref 0–7)
ERYTHROCYTE [DISTWIDTH] IN BLOOD BY AUTOMATED COUNT: 33 % (ref 11.5–14.5)
HCT VFR BLD AUTO: 31.7 % (ref 36.6–50.3)
HGB BLD-MCNC: 9.3 G/DL (ref 12.1–17)
IMM GRANULOCYTES # BLD AUTO: 0 K/UL
IMM GRANULOCYTES NFR BLD AUTO: 0 %
KAPPA LC FREE SER-MCNC: 43.4 MG/L (ref 3.3–19.4)
KAPPA LC FREE/LAMBDA FREE SER: 1.72 (ref 0.26–1.65)
LAMBDA LC FREE SERPL-MCNC: 25.3 MG/L (ref 5.7–26.3)
LYMPHOCYTES # BLD: 1 K/UL (ref 0.8–3.5)
LYMPHOCYTES NFR BLD: 11 % (ref 12–49)
MCH RBC QN AUTO: 25.6 PG (ref 26–34)
MCHC RBC AUTO-ENTMCNC: 29.3 G/DL (ref 30–36.5)
MCV RBC AUTO: 87.3 FL (ref 80–99)
MONOCYTES # BLD: 1 K/UL (ref 0–1)
MONOCYTES NFR BLD: 11 % (ref 5–13)
NEUTS SEG # BLD: 6.4 K/UL (ref 1.8–8)
NEUTS SEG NFR BLD: 69 % (ref 32–75)
NRBC # BLD: 0.06 K/UL (ref 0–0.01)
NRBC BLD-RTO: 0.7 PER 100 WBC
PLATELET # BLD AUTO: 107 K/UL (ref 150–400)
RBC # BLD AUTO: 3.63 M/UL (ref 4.1–5.7)
RBC MORPH BLD: ABNORMAL
WBC # BLD AUTO: 9.2 K/UL (ref 4.1–11.1)

## 2023-02-27 PROCEDURE — 65660000001 HC RM ICU INTERMED STEPDOWN

## 2023-02-27 PROCEDURE — 85025 COMPLETE CBC W/AUTO DIFF WBC: CPT

## 2023-02-27 PROCEDURE — 74011250637 HC RX REV CODE- 250/637: Performed by: INTERNAL MEDICINE

## 2023-02-27 PROCEDURE — 74011000250 HC RX REV CODE- 250: Performed by: INTERNAL MEDICINE

## 2023-02-27 PROCEDURE — 36415 COLL VENOUS BLD VENIPUNCTURE: CPT

## 2023-02-27 PROCEDURE — 74011250637 HC RX REV CODE- 250/637: Performed by: PSYCHIATRY & NEUROLOGY

## 2023-02-27 RX ADMIN — RISPERIDONE 1 MG: 1 TABLET ORAL at 21:04

## 2023-02-27 RX ADMIN — POLYETHYLENE GLYCOL 3350, SODIUM SULFATE ANHYDROUS, SODIUM BICARBONATE, SODIUM CHLORIDE, POTASSIUM CHLORIDE 4000 ML: 236; 22.74; 6.74; 5.86; 2.97 POWDER, FOR SOLUTION ORAL at 12:45

## 2023-02-27 RX ADMIN — DIAZEPAM 2 MG: 2 TABLET ORAL at 21:04

## 2023-02-27 RX ADMIN — DIAZEPAM 2 MG: 2 TABLET ORAL at 07:39

## 2023-02-27 RX ADMIN — OXYCODONE AND ACETAMINOPHEN 1 TABLET: 5; 325 TABLET ORAL at 13:31

## 2023-02-27 RX ADMIN — DIAZEPAM 2 MG: 2 TABLET ORAL at 13:31

## 2023-02-27 RX ADMIN — FERROUS SULFATE TAB 325 MG (65 MG ELEMENTAL FE) 325 MG: 325 (65 FE) TAB at 07:37

## 2023-02-27 RX ADMIN — AMITRIPTYLINE HYDROCHLORIDE 25 MG: 50 TABLET, FILM COATED ORAL at 21:05

## 2023-02-27 RX ADMIN — OXYCODONE AND ACETAMINOPHEN 1 TABLET: 5; 325 TABLET ORAL at 21:04

## 2023-02-27 RX ADMIN — OXYCODONE AND ACETAMINOPHEN 1 TABLET: 5; 325 TABLET ORAL at 07:37

## 2023-02-27 RX ADMIN — SODIUM CHLORIDE, PRESERVATIVE FREE 10 ML: 5 INJECTION INTRAVENOUS at 21:06

## 2023-02-27 RX ADMIN — SODIUM CHLORIDE, PRESERVATIVE FREE 10 ML: 5 INJECTION INTRAVENOUS at 13:01

## 2023-02-27 NOTE — PROGRESS NOTES
Hospitalist Progress Note               Daily Progress Note: 2/27/2023      Subjective:       2/27/2023-no new complaints. Not short of breath. Eating well. Refused work with physical therapy. Complaining of chronic pains and aches  2/26/2023-no new complaints. Eating well. Not short of breath. Limited activity. 2/25/2023-  Short of breath on exertion. Patient wants endoscopies and full work-up before discharge. 2/24/23-GI recommends outpatient evaluation; transfused 2 units PRBC  Shortness of breath on exertion  Oncology wants patient evaluated for GI bleed prior to discharge  2/23/2023-states that he had a good night of sleep. Feels a lot better. Not as short of breath. Did not cooperative physical therapy. Eating better. 2/22/2023-transfused 3 units PRBCs, reported hallucinations, reported back spasms and leg spasms-escalated need for pain medications. Received intermittent Lasix. Did not appreciate fluid restriction and salt restricted diet  Later seen by psychiatry. Placed him on amitriptyline; Valium for anxiety and risperidone for hallucinations    Hospital course to date:  Clay Hardy is a 60 y.o. male who presents with shortness of breath. He had past medical history of COPD and paraplegia. He reports to be experiencing worsening shortness of breath on exertion and must rest to regain his breath. He reports to be having difficult time forming sentences while short of breath. He reports chronic pain in his left axillae, left lower extremity and low back at the level of t10-l1 which apparently is the same level of his spinal damage. He is not very mobile and uses a wheel chair when not using leg bracing. He has history of amputations of the toes.            Problem List:  Problem List as of 2/27/2023 Never Reviewed            Codes Class Noted - Resolved    Severe anemia ICD-10-CM: D64.9  ICD-9-CM: 285.9  2/21/2023 - Present           Medications reviewed  Current Facility-Administered Medications   Medication Dose Route Frequency    0.9% sodium chloride infusion 250 mL  250 mL IntraVENous PRN    oxyCODONE-acetaminophen (PERCOCET) 5-325 mg per tablet 1 Tablet  1 Tablet Oral Q6H PRN    amitriptyline (ELAVIL) tablet 25 mg  25 mg Oral QHS    diazePAM (VALIUM) tablet 2 mg  2 mg Oral Q6H PRN    risperiDONE (RisperDAL) tablet 1 mg  1 mg Oral QHS    ferrous sulfate tablet 325 mg  1 Tablet Oral DAILY WITH BREAKFAST    0.9% sodium chloride infusion 250 mL  250 mL IntraVENous PRN    sodium chloride (NS) flush 5-40 mL  5-40 mL IntraVENous Q8H    sodium chloride (NS) flush 5-40 mL  5-40 mL IntraVENous PRN    acetaminophen (TYLENOL) tablet 650 mg  650 mg Oral Q6H PRN    Or    acetaminophen (TYLENOL) suppository 650 mg  650 mg Rectal Q6H PRN    polyethylene glycol (MIRALAX) packet 17 g  17 g Oral DAILY PRN    promethazine (PHENERGAN) tablet 12.5 mg  12.5 mg Oral Q6H PRN    Or    ondansetron (ZOFRAN) injection 4 mg  4 mg IntraVENous Q6H PRN    melatonin tablet 6 mg  6 mg Oral QHS PRN    lidocaine 4 % patch 2 Patch  2 Patch TransDERmal Q12H PRN       Review of Systems:   Pertinent items are noted in HPI. Objective:   Physical Exam:     Visit Vitals  /77 (BP 1 Location: Right upper arm, BP Patient Position: At rest)   Pulse 68   Temp 98.7 °F (37.1 °C)   Resp 16   Ht 5' 8\" (1.727 m)   Wt 54.4 kg (120 lb)   SpO2 99%   BMI 18.25 kg/m²      O2 Device: None (Room air)    Temp (24hrs), Av.3 °F (36.8 °C), Min:97.9 °F (36.6 °C), Max:98.7 °F (37.1 °C)    No intake/output data recorded. No intake/output data recorded. General:   Awake and alert   Lungs:   Clear to auscultation bilaterally. Chest wall:  No tenderness or deformity. Heart:  Regular rate and rhythm, S1, S2 normal, no murmur, click, rub or gallop. Bibasilar crackles   Abdomen:   Soft, non-tender. Bowel sounds normal. No masses,  No organomegaly. Extremities: Extremities normal, atraumatic, no cyanosis or edema. Pulses: 2+ and symmetric all extremities. Skin: Skin color, texture, turgor normal. No rashes or lesions   Neurologic: CNII-XII intact. No gross focal deficits    No gross lymphadenopathy          Data Review:       Recent Days:  Recent Labs     02/27/23  1030 02/25/23  0501   WBC 9.2 10.1   HGB 9.3* 9.1*   HCT 31.7* 29.4*   * 128*     No results for input(s): NA, K, CL, CO2, GLU, BUN, CREA, CA, MG, PHOS, ALB, TBIL, TBILI, ALT, INR, INREXT, INREXT in the last 72 hours. No lab exists for component: SGOT    No results for input(s): PH, PCO2, PO2, HCO3, FIO2 in the last 72 hours. 24 Hour Results:  Recent Results (from the past 24 hour(s))   CBC WITH AUTOMATED DIFF    Collection Time: 02/27/23 10:30 AM   Result Value Ref Range    WBC 9.2 4.1 - 11.1 K/uL    RBC 3.63 (L) 4.10 - 5.70 M/uL    HGB 9.3 (L) 12.1 - 17.0 g/dL    HCT 31.7 (L) 36.6 - 50.3 %    MCV 87.3 80.0 - 99.0 FL    MCH 25.6 (L) 26.0 - 34.0 PG    MCHC 29.3 (L) 30.0 - 36.5 g/dL    RDW 33.0 (H) 11.5 - 14.5 %    PLATELET 466 (L) 106 - 400 K/uL    NRBC 0.7 (H) 0.0  WBC    ABSOLUTE NRBC 0.06 (H) 0.00 - 0.01 K/uL    NEUTROPHILS 69 32 - 75 %    LYMPHOCYTES 11 (L) 12 - 49 %    MONOCYTES 11 5 - 13 %    EOSINOPHILS 7 0 - 7 %    BASOPHILS 2 (H) 0 - 1 %    IMMATURE GRANULOCYTES 0 %    ABS. NEUTROPHILS 6.4 1.8 - 8.0 K/UL    ABS. LYMPHOCYTES 1.0 0.8 - 3.5 K/UL    ABS. MONOCYTES 1.0 0.0 - 1.0 K/UL    ABS. EOSINOPHILS 0.6 (H) 0.0 - 0.4 K/UL    ABS. BASOPHILS 0.2 (H) 0.0 - 0.1 K/UL    ABS. IMM. GRANS. 0.0 K/UL    DF Smear Scanned      RBC COMMENTS Anisocytosis  1+        RBC COMMENTS Microcytosis  1+        RBC COMMENTS Polychromasia  1+             CTA CHEST W OR W WO CONT   Final Result      1. No evidence of pulmonary embolism. 2. Mild pulmonary edema in the lower lobes. Trace right pleural effusion. Trace   perihepatic ascites. 3. Cardiomegaly. Enlarged pulmonary arteries, consistent with pulmonary arterial   hypertension.    4. Additional incidental findings as above. XR CHEST SNGL V   Final Result   Central pulmonary vascular congestion versus perihilar opacities   that can be seen with a viral process. Assessment:    Severe chronic anemia-s/p blood transfusion with iron deficiency; resolved. Hemoglobin stable at 9.3  Chronic CHF, high-output secondary to severe anemia; symptom control better  History of major depression-followed by psychiatry and initiated on medications  Severe iron deficiency  Recurrent insomnia-initiated on medications  Protein calorie malnutrition  Paraparesis lower extremities  Bilateral foot drop  High lumbar low thoracic spinal fusion  Noncompliance      Discussion/MDM: Patient with multiple medical comorbidities, each with high likelihood for morbidity and mortality if left untreated. Patient being treated with medication that requires intensive monitoring due to increased risk for toxicity. I have reviewed patient's presenting subjective and objective findings, as well as all laboratory studies, imaging studies, and vital signs to date as well as treatment rendered and patient's response to those treatments. In addition, prior medical, surgical and relevant social and family histories were reviewed. Prior to this, Patient expressively wants endoscopies and full evaluation before being discharged. He states he does not have the means to follow-up outpatient being a paraplegic and having no social support    Plan:  Appreciate GI working him up for possible causes of bleeding. CMP tomorrow morning  Discharge once work-up is clear  Anemia markers suggestive of severe iron deficiency      Care Plan discussed with: Patient/Family    Code status: Full    Social determinants of health:    Disposition:     Total time spent with patient: 20 minutes.     Emmy Mora MD

## 2023-02-27 NOTE — PROGRESS NOTES
Hematology and Oncology Progress Note    Patient: Malika Camejo MRN: [id number]  SSN: [id number]    YOB: 1960  Age: 60 y.o. Sex: male      Admit Date: 2/21/2023    LOS: 6 days     Chief Complaint: Patient was admitted with increasing shortness of breath and severe fatigue    Subjective:     Patient is feeling better. Denies pain. Objective:     Vitals:    02/26/23 0827 02/26/23 1705 02/26/23 2123 02/27/23 0835   BP: 122/78 122/81 (!) 147/96 107/77   Pulse: 94 97 (!) 49 68   Resp: 16 16 16    Temp: 98.2 °F (36.8 °C) 97.9 °F (36.6 °C) 98.4 °F (36.9 °C) 98.7 °F (37.1 °C)   SpO2: 97% 98% 97% 99%   Weight:       Height:          Physical Exam:  Constitutional: [de-identified] white male looks older for his age. Not in any acute distress or pain. His complexion is significantly pale. Eyes: Sclerae anicteric. Conjunctivae shows severe pallor. ENMT: Oral mucosa is moist, no thrush, mucositis, or petechiae. Neck: No adenopathy   Respiratory: Lungs are clear bilaterally. Cardiovascular: Sinus tachycardia with holosystolic murmur. Abdomen: Soft, nontender, no hepatosplenomegaly. No guarding or rigidity. Bowel sounds present. Extremities: No edema. Skin: No petechiae; no skin rash. Neurologic: Alert/oriented x 3. Has lower extremity weakness.     Lab/Data Review:    Recent Results (from the past 24 hour(s))   CBC WITH AUTOMATED DIFF    Collection Time: 02/27/23 10:30 AM   Result Value Ref Range    WBC 9.2 4.1 - 11.1 K/uL    RBC 3.63 (L) 4.10 - 5.70 M/uL    HGB 9.3 (L) 12.1 - 17.0 g/dL    HCT 31.7 (L) 36.6 - 50.3 %    MCV 87.3 80.0 - 99.0 FL    MCH 25.6 (L) 26.0 - 34.0 PG    MCHC 29.3 (L) 30.0 - 36.5 g/dL    RDW 33.0 (H) 11.5 - 14.5 %    PLATELET 755 (L) 462 - 400 K/uL    NRBC 0.7 (H) 0.0  WBC    ABSOLUTE NRBC 0.06 (H) 0.00 - 0.01 K/uL    NEUTROPHILS 69 32 - 75 %    LYMPHOCYTES 11 (L) 12 - 49 %    MONOCYTES 11 5 - 13 %    EOSINOPHILS 7 0 - 7 %    BASOPHILS 2 (H) 0 - 1 %    IMMATURE GRANULOCYTES 0 %    ABS. NEUTROPHILS 6.4 1.8 - 8.0 K/UL    ABS. LYMPHOCYTES 1.0 0.8 - 3.5 K/UL    ABS. MONOCYTES 1.0 0.0 - 1.0 K/UL    ABS. EOSINOPHILS 0.6 (H) 0.0 - 0.4 K/UL    ABS. BASOPHILS 0.2 (H) 0.0 - 0.1 K/UL    ABS. IMM. GRANS. 0.0 K/UL    DF Smear Scanned      RBC COMMENTS Anisocytosis  1+        RBC COMMENTS Microcytosis  1+        RBC COMMENTS Polychromasia  1+                 Assessment and plan:     26-year-old white male with history of COPD and paraplegia came with shortness of breath and was found to have symptomatic anemia. Iron deficiency anemia: Status post  5 units of PRBC. Patient hemoglobin better. Monitor. Patient seen by GI. Plans for EGD and colonoscopy tomorrow noted. Transfuse PRBC for hemoglobin less than 7 or symptomatic. Thrombocytopenia: Mild. Possible dilutional.  Monitor. Check peripheral smear and serum protein electrophoresis. B12 and folate levels adequate. This dictation was done by dragon, computer voice recognition software. Often unanticipated grammatical, syntax, phones and other interpretive errors are inadvertently transcribed. Please excuse errors that have escaped final proofreading.        Signed By: Terri Schwartz MD     February 27, 2023

## 2023-02-27 NOTE — PROGRESS NOTES
OT orders received and acknowledged, per PT evaluation pt currently ambulating INDly within room and appears at baseline. He is declining therapy services at this time. Pt currently has no skilled acute care OT needs, please reorder if status changes. Current OT orders discontinued at this time. Thank you.

## 2023-02-27 NOTE — PROGRESS NOTES
Problem: Pressure Injury - Risk of  Goal: *Prevention of pressure injury  Description: Document Jose Carlos Scale and appropriate interventions in the flowsheet. Outcome: Progressing Towards Goal  Note: Pressure Injury Interventions:  Sensory Interventions: Assess changes in LOC, Keep linens dry and wrinkle-free, Minimize linen layers, Pressure redistribution bed/mattress (bed type)    Moisture Interventions: Absorbent underpads, Minimize layers    Activity Interventions: Pressure redistribution bed/mattress(bed type)    Mobility Interventions: Pressure redistribution bed/mattress (bed type)    Nutrition Interventions: Document food/fluid/supplement intake    Friction and Shear Interventions: Minimize layers                Problem: Falls - Risk of  Goal: *Absence of Falls  Description: Document Noa Fall Risk and appropriate interventions in the flowsheet.   Outcome: Progressing Towards Goal  Note: Fall Risk Interventions:  Mobility Interventions: Bed/chair exit alarm, Communicate number of staff needed for ambulation/transfer, Patient to call before getting OOB    Mentation Interventions: Adequate sleep, hydration, pain control, Bed/chair exit alarm, Room close to nurse's station    Medication Interventions: Bed/chair exit alarm, Patient to call before getting OOB         History of Falls Interventions: Bed/chair exit alarm, Room close to nurse's station

## 2023-02-27 NOTE — PROGRESS NOTES
Chart reviewed, DCP remains for patient to d/c from CM to home self once medically stable. CM continues to follow and monitor for needs.

## 2023-02-27 NOTE — PROGRESS NOTES
Progress Note    Patient: Malika Camejo MRN: [id number]  SSN: [id number]    YOB: 1960  Age: 60 y.o. Sex: male      Admit Date: 2/21/2023    LOS: 6 days     Subjective:     No melena no pain  Want all endo to be done here  No past medical history on file.      Current Facility-Administered Medications:     peg 3350-electrolytes (COLYTE) 4000 mL, 4,000 mL, Oral, BID, Andrea Ross MD    0.9% sodium chloride infusion 250 mL, 250 mL, IntraVENous, PRN, Andrea Ross MD    oxyCODONE-acetaminophen (PERCOCET) 5-325 mg per tablet 1 Tablet, 1 Tablet, Oral, Q6H PRN, Sedrick Hopson MD, 1 Tablet at 02/27/23 0737    amitriptyline (ELAVIL) tablet 25 mg, 25 mg, Oral, QHS, Familia Brown MD, 25 mg at 02/26/23 2107    diazePAM (VALIUM) tablet 2 mg, 2 mg, Oral, Q6H PRN, Marycruz ATYLOR MD, 2 mg at 02/27/23 4317    risperiDONE (RisperDAL) tablet 1 mg, 1 mg, Oral, QHS, Familia Borwn MD, 1 mg at 02/26/23 2107    ferrous sulfate tablet 325 mg, 1 Tablet, Oral, DAILY WITH BREAKFAST, Ct Gaitan MD, 325 mg at 02/27/23 0737    0.9% sodium chloride infusion 250 mL, 250 mL, IntraVENous, PRN, Jonathan Ro MD    sodium chloride (NS) flush 5-40 mL, 5-40 mL, IntraVENous, Q8H, Titi Contreras MD, 10 mL at 02/26/23 2107    sodium chloride (NS) flush 5-40 mL, 5-40 mL, IntraVENous, PRN, Titi Contreras MD    acetaminophen (TYLENOL) tablet 650 mg, 650 mg, Oral, Q6H PRN, 650 mg at 02/21/23 2247 **OR** acetaminophen (TYLENOL) suppository 650 mg, 650 mg, Rectal, Q6H PRN, Titi Contreras MD    polyethylene glycol (MIRALAX) packet 17 g, 17 g, Oral, DAILY PRN, Titi Contreras MD    promethazine (PHENERGAN) tablet 12.5 mg, 12.5 mg, Oral, Q6H PRN **OR** ondansetron (ZOFRAN) injection 4 mg, 4 mg, IntraVENous, Q6H PRN, Betsy Contreras MD    melatonin tablet 6 mg, 6 mg, Oral, QHS PRN, Fay Ochoa MD, 6 mg at 02/21/23 8305    lidocaine 4 % patch 2 Patch, 2 Patch, TransDERmal, Q12H PRN, Fay Ochoa MD, 1 Patch at 02/24/23 2104    Objective:     Vitals:    02/26/23 0827 02/26/23 1705 02/26/23 2123 02/27/23 0835   BP: 122/78 122/81 (!) 147/96 107/77   Pulse: 94 97 (!) 49 68   Resp: 16 16 16    Temp: 98.2 °F (36.8 °C) 97.9 °F (36.6 °C) 98.4 °F (36.9 °C) 98.7 °F (37.1 °C)   SpO2: 97% 98% 97% 99%   Weight:       Height:            Intake and Output:  Current Shift: No intake/output data recorded. Last three shifts: No intake/output data recorded. Physical Exam:   Physical Exam  Constitutional:       Appearance: He is ill-appearing. Cardiovascular:      Rate and Rhythm: Rhythm irregular. Pulmonary:      Breath sounds: Normal breath sounds. Abdominal:      Palpations: Abdomen is soft. Musculoskeletal:      Cervical back: Neck supple. Neurological:      General: No focal deficit present. Lab/Data Review:  Recent Results (from the past 24 hour(s))   CBC WITH AUTOMATED DIFF    Collection Time: 02/27/23 10:30 AM   Result Value Ref Range    WBC 9.2 4.1 - 11.1 K/uL    RBC 3.63 (L) 4.10 - 5.70 M/uL    HGB 9.3 (L) 12.1 - 17.0 g/dL    HCT 31.7 (L) 36.6 - 50.3 %    MCV 87.3 80.0 - 99.0 FL    MCH 25.6 (L) 26.0 - 34.0 PG    MCHC 29.3 (L) 30.0 - 36.5 g/dL    RDW 33.0 (H) 11.5 - 14.5 %    PLATELET 595 (L) 655 - 400 K/uL    NRBC 0.7 (H) 0.0  WBC    ABSOLUTE NRBC 0.06 (H) 0.00 - 0.01 K/uL    NEUTROPHILS 69 32 - 75 %    LYMPHOCYTES 11 (L) 12 - 49 %    MONOCYTES 11 5 - 13 %    EOSINOPHILS 7 0 - 7 %    BASOPHILS 2 (H) 0 - 1 %    IMMATURE GRANULOCYTES 0 %    ABS. NEUTROPHILS 6.4 1.8 - 8.0 K/UL    ABS. LYMPHOCYTES 1.0 0.8 - 3.5 K/UL    ABS. MONOCYTES 1.0 0.0 - 1.0 K/UL    ABS. EOSINOPHILS 0.6 (H) 0.0 - 0.4 K/UL    ABS. BASOPHILS 0.2 (H) 0.0 - 0.1 K/UL    ABS. IMM. GRANS. 0.0 K/UL    DF Smear Scanned      RBC COMMENTS Anisocytosis  1+        RBC COMMENTS Microcytosis  1+        RBC COMMENTS Polychromasia  1+            CTA CHEST W OR W WO CONT   Final Result      1. No evidence of pulmonary embolism.    2. Mild pulmonary edema in the lower lobes. Trace right pleural effusion. Trace   perihepatic ascites. 3. Cardiomegaly. Enlarged pulmonary arteries, consistent with pulmonary arterial   hypertension. 4. Additional incidental findings as above. XR CHEST SNGL V   Final Result   Central pulmonary vascular congestion versus perihilar opacities   that can be seen with a viral process. Assessment:     Active Problems:    Severe anemia (2/21/2023)  Obscure GI blood loss, stool test was not done, no reported melena, hematochezia     Vascular weakness shortness of breath, indicating symptomatic anemia    Microcytic, hypochromic, iron deficiency,    Plan:   Continue hemoglobin closely,  Stool test as needed for Hemoccult pending or not collected  Bowel prep today    EGD colonoscopy in am   Findings, plan discussed with patient  and his RN       This dictation was done by dragon, computer voice recognition software. Often unanticipated grammatical, syntax, phones and other interpretive errors are inadvertently transcribed. Please excuse errors that have escaped final proofreading.     Signed By: Josafat Pepe MD     February 27, 2023        Thank you for allowing me to participate in this patients care  Cc Referring Physician   None

## 2023-02-27 NOTE — PROGRESS NOTES
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 Patient denied any injury but complained of pain to left shoulder and arm, and back. PRN pain medication received. No visible injuries present on the patient skin during assessment. Dr. Anai Zhou came into the patient's room around [place] [address] and was also notified of the patient's fall. Dr. Anai Zhou requested that the patient receive physical therapy for further evaluation. Yet the patient refused.

## 2023-02-28 ENCOUNTER — APPOINTMENT (OUTPATIENT)
Dept: ENDOSCOPY | Age: 63
DRG: 377 | End: 2023-02-28
Attending: INTERNAL MEDICINE
Payer: MEDICARE

## 2023-02-28 ENCOUNTER — ANESTHESIA EVENT (OUTPATIENT)
Dept: ENDOSCOPY | Age: 63
DRG: 377 | End: 2023-02-28
Payer: MEDICARE

## 2023-02-28 ENCOUNTER — ANESTHESIA (OUTPATIENT)
Dept: ENDOSCOPY | Age: 63
DRG: 377 | End: 2023-02-28
Payer: MEDICARE

## 2023-02-28 LAB
ALBUMIN SERPL-MCNC: 2.8 G/DL (ref 3.5–5)
ALBUMIN/GLOB SERPL: 0.8 (ref 1.1–2.2)
ALP SERPL-CCNC: 101 U/L (ref 45–117)
ALT SERPL-CCNC: 78 U/L (ref 12–78)
ANION GAP SERPL CALC-SCNC: 4 MMOL/L (ref 5–15)
AST SERPL W P-5'-P-CCNC: 26 U/L (ref 15–37)
BILIRUB SERPL-MCNC: 1 MG/DL (ref 0.2–1)
BUN SERPL-MCNC: 7 MG/DL (ref 6–20)
BUN/CREAT SERPL: 13 (ref 12–20)
CA-I BLD-MCNC: 8.5 MG/DL (ref 8.5–10.1)
CHLORIDE SERPL-SCNC: 108 MMOL/L (ref 97–108)
CO2 SERPL-SCNC: 30 MMOL/L (ref 21–32)
CREAT SERPL-MCNC: 0.53 MG/DL (ref 0.7–1.3)
GLOBULIN SER CALC-MCNC: 3.5 G/DL (ref 2–4)
GLUCOSE SERPL-MCNC: 94 MG/DL (ref 65–100)
IGA SERPL-MCNC: 293 MG/DL (ref 61–437)
IGG SERPL-MCNC: 1431 MG/DL (ref 603–1613)
IGM SERPL-MCNC: 139 MG/DL (ref 20–172)
POTASSIUM SERPL-SCNC: 3.9 MMOL/L (ref 3.5–5.1)
PROT PATTERN SERPL IFE-IMP: NORMAL
PROT SERPL-MCNC: 6.3 G/DL (ref 6.4–8.2)
SODIUM SERPL-SCNC: 142 MMOL/L (ref 136–145)

## 2023-02-28 PROCEDURE — 0DB68ZX EXCISION OF STOMACH, VIA NATURAL OR ARTIFICIAL OPENING ENDOSCOPIC, DIAGNOSTIC: ICD-10-PCS | Performed by: INTERNAL MEDICINE

## 2023-02-28 PROCEDURE — 74011000250 HC RX REV CODE- 250: Performed by: INTERNAL MEDICINE

## 2023-02-28 PROCEDURE — 80053 COMPREHEN METABOLIC PANEL: CPT

## 2023-02-28 PROCEDURE — 74011250637 HC RX REV CODE- 250/637: Performed by: INTERNAL MEDICINE

## 2023-02-28 PROCEDURE — 77030021593 HC FCPS BIOP ENDOSC BSC -A: Performed by: INTERNAL MEDICINE

## 2023-02-28 PROCEDURE — 74011250637 HC RX REV CODE- 250/637: Performed by: PSYCHIATRY & NEUROLOGY

## 2023-02-28 PROCEDURE — 88305 TISSUE EXAM BY PATHOLOGIST: CPT

## 2023-02-28 PROCEDURE — 2709999900 HC NON-CHARGEABLE SUPPLY: Performed by: INTERNAL MEDICINE

## 2023-02-28 PROCEDURE — 0DJD8ZZ INSPECTION OF LOWER INTESTINAL TRACT, VIA NATURAL OR ARTIFICIAL OPENING ENDOSCOPIC: ICD-10-PCS | Performed by: INTERNAL MEDICINE

## 2023-02-28 PROCEDURE — 74011250636 HC RX REV CODE- 250/636: Performed by: NURSE PRACTITIONER

## 2023-02-28 PROCEDURE — 74011000250 HC RX REV CODE- 250: Performed by: NURSE PRACTITIONER

## 2023-02-28 PROCEDURE — 88312 SPECIAL STAINS GROUP 1: CPT

## 2023-02-28 PROCEDURE — 65660000001 HC RM ICU INTERMED STEPDOWN

## 2023-02-28 PROCEDURE — 76040000019: Performed by: INTERNAL MEDICINE

## 2023-02-28 PROCEDURE — 76060000031 HC ANESTHESIA FIRST 0.5 HR: Performed by: INTERNAL MEDICINE

## 2023-02-28 PROCEDURE — 36415 COLL VENOUS BLD VENIPUNCTURE: CPT

## 2023-02-28 RX ORDER — PROPOFOL 10 MG/ML
INJECTION, EMULSION INTRAVENOUS AS NEEDED
Status: DISCONTINUED | OUTPATIENT
Start: 2023-02-28 | End: 2023-02-28 | Stop reason: HOSPADM

## 2023-02-28 RX ORDER — SODIUM CHLORIDE, SODIUM LACTATE, POTASSIUM CHLORIDE, CALCIUM CHLORIDE 600; 310; 30; 20 MG/100ML; MG/100ML; MG/100ML; MG/100ML
INJECTION, SOLUTION INTRAVENOUS
Status: DISCONTINUED | OUTPATIENT
Start: 2023-02-28 | End: 2023-02-28 | Stop reason: HOSPADM

## 2023-02-28 RX ORDER — LIDOCAINE HYDROCHLORIDE 20 MG/ML
INJECTION, SOLUTION EPIDURAL; INFILTRATION; INTRACAUDAL; PERINEURAL AS NEEDED
Status: DISCONTINUED | OUTPATIENT
Start: 2023-02-28 | End: 2023-02-28 | Stop reason: HOSPADM

## 2023-02-28 RX ADMIN — SODIUM CHLORIDE, PRESERVATIVE FREE 10 ML: 5 INJECTION INTRAVENOUS at 13:04

## 2023-02-28 RX ADMIN — ACETAMINOPHEN 650 MG: 325 TABLET ORAL at 21:29

## 2023-02-28 RX ADMIN — OXYCODONE AND ACETAMINOPHEN 1 TABLET: 5; 325 TABLET ORAL at 07:51

## 2023-02-28 RX ADMIN — LIDOCAINE HYDROCHLORIDE 100 MG: 20 INJECTION, SOLUTION EPIDURAL; INFILTRATION; INTRACAUDAL; PERINEURAL at 13:45

## 2023-02-28 RX ADMIN — RISPERIDONE 1 MG: 1 TABLET ORAL at 21:30

## 2023-02-28 RX ADMIN — PROPOFOL 100 MG: 10 INJECTION, EMULSION INTRAVENOUS at 13:45

## 2023-02-28 RX ADMIN — SODIUM CHLORIDE, PRESERVATIVE FREE 10 ML: 5 INJECTION INTRAVENOUS at 22:39

## 2023-02-28 RX ADMIN — SODIUM CHLORIDE, POTASSIUM CHLORIDE, SODIUM LACTATE AND CALCIUM CHLORIDE: 600; 310; 30; 20 INJECTION, SOLUTION INTRAVENOUS at 13:29

## 2023-02-28 RX ADMIN — OXYCODONE AND ACETAMINOPHEN 1 TABLET: 5; 325 TABLET ORAL at 21:29

## 2023-02-28 RX ADMIN — AMITRIPTYLINE HYDROCHLORIDE 25 MG: 50 TABLET, FILM COATED ORAL at 21:29

## 2023-02-28 RX ADMIN — PHENYLEPHRINE HYDROCHLORIDE 100 MCG: 10 INJECTION INTRAVENOUS at 13:54

## 2023-02-28 RX ADMIN — POLYETHYLENE GLYCOL 3350, SODIUM SULFATE ANHYDROUS, SODIUM BICARBONATE, SODIUM CHLORIDE, POTASSIUM CHLORIDE 4000 ML: 236; 22.74; 6.74; 5.86; 2.97 POWDER, FOR SOLUTION ORAL at 16:01

## 2023-02-28 RX ADMIN — PROPOFOL 50 MG: 10 INJECTION, EMULSION INTRAVENOUS at 13:53

## 2023-02-28 RX ADMIN — FERROUS SULFATE TAB 325 MG (65 MG ELEMENTAL FE) 325 MG: 325 (65 FE) TAB at 07:51

## 2023-02-28 RX ADMIN — PROPOFOL 50 MG: 10 INJECTION, EMULSION INTRAVENOUS at 13:49

## 2023-02-28 RX ADMIN — DIAZEPAM 2 MG: 2 TABLET ORAL at 07:51

## 2023-02-28 NOTE — ANESTHESIA PREPROCEDURE EVALUATION
Relevant Problems   HEMATOLOGY   (+) Severe anemia       Anesthetic History   No history of anesthetic complications            Review of Systems / Medical History  Patient summary reviewed and pertinent labs reviewed    Pulmonary    COPD: mild               Neuro/Psych   Within defined limits           Cardiovascular  Within defined limits                     GI/Hepatic/Renal  Within defined limits              Endo/Other        Anemia     Other Findings          No past medical history on file. No past surgical history on file.     No current outpatient medications    Current Facility-Administered Medications   Medication Dose Route Frequency    0.9% sodium chloride infusion 250 mL  250 mL IntraVENous PRN    oxyCODONE-acetaminophen (PERCOCET) 5-325 mg per tablet 1 Tablet  1 Tablet Oral Q6H PRN    amitriptyline (ELAVIL) tablet 25 mg  25 mg Oral QHS    diazePAM (VALIUM) tablet 2 mg  2 mg Oral Q6H PRN    risperiDONE (RisperDAL) tablet 1 mg  1 mg Oral QHS    ferrous sulfate tablet 325 mg  1 Tablet Oral DAILY WITH BREAKFAST    0.9% sodium chloride infusion 250 mL  250 mL IntraVENous PRN    sodium chloride (NS) flush 5-40 mL  5-40 mL IntraVENous Q8H    sodium chloride (NS) flush 5-40 mL  5-40 mL IntraVENous PRN    acetaminophen (TYLENOL) tablet 650 mg  650 mg Oral Q6H PRN    Or    acetaminophen (TYLENOL) suppository 650 mg  650 mg Rectal Q6H PRN    polyethylene glycol (MIRALAX) packet 17 g  17 g Oral DAILY PRN    promethazine (PHENERGAN) tablet 12.5 mg  12.5 mg Oral Q6H PRN    Or    ondansetron (ZOFRAN) injection 4 mg  4 mg IntraVENous Q6H PRN    melatonin tablet 6 mg  6 mg Oral QHS PRN    lidocaine 4 % patch 2 Patch  2 Patch TransDERmal Q12H PRN       Patient Vitals for the past 24 hrs:   Temp Pulse Resp BP SpO2   02/28/23 0949 36.7 °C (98 °F) 90 18 120/83 98 %   02/27/23 2029 37 °C (98.6 °F) 92 16 124/79 98 %   02/27/23 1718 37.1 °C (98.7 °F) 98 16 125/80 98 %       Lab Results   Component Value Date/Time    WBC 9.2 02/27/2023 10:30 AM    HGB 9.3 (L) 02/27/2023 10:30 AM    HCT 31.7 (L) 02/27/2023 10:30 AM    PLATELET 520 (L) 52/04/8785 10:30 AM    MCV 87.3 02/27/2023 10:30 AM     Lab Results   Component Value Date/Time    Sodium 142 02/28/2023 09:16 AM    Potassium 3.9 02/28/2023 09:16 AM    Chloride 108 02/28/2023 09:16 AM    CO2 30 02/28/2023 09:16 AM    Anion gap 4 (L) 02/28/2023 09:16 AM    Glucose 94 02/28/2023 09:16 AM    BUN 7 02/28/2023 09:16 AM    Creatinine 0.53 (L) 02/28/2023 09:16 AM    BUN/Creatinine ratio 13 02/28/2023 09:16 AM    Calcium 8.5 02/28/2023 09:16 AM     No results found for: APTT, PTP, INR, INREXT  Lab Results   Component Value Date/Time    Glucose 94 02/28/2023 09:16 AM         Physical Exam    Airway  Mallampati: III  TM Distance: 4 - 6 cm  Neck ROM: normal range of motion   Mouth opening: Normal     Cardiovascular    Rhythm: regular  Rate: normal         Dental    Dentition: Edentulous     Pulmonary  Breath sounds clear to auscultation               Abdominal  GI exam deferred       Other Findings            Anesthetic Plan    ASA: 3  Anesthesia type: total IV anesthesia and MAC    Monitoring Plan: Continuous noninvasive hemodynamic monitoring      Induction: Intravenous  Anesthetic plan and risks discussed with: Patient

## 2023-02-28 NOTE — ANESTHESIA POSTPROCEDURE EVALUATION
Procedure(s):  ESOPHAGOGASTRODUODENOSCOPY (EGD)  COLONOSCOPY.    total IV anesthesia, MAC    Anesthesia Post Evaluation      Multimodal analgesia: multimodal analgesia not used between 6 hours prior to anesthesia start to PACU discharge  Patient location during evaluation: bedside (Endoscopy suite)  Patient participation: complete - patient cannot participate  Level of consciousness: sleepy but conscious  Pain score: 0  Pain management: adequate  Airway patency: patent  Anesthetic complications: no  Cardiovascular status: acceptable  Respiratory status: acceptable and nasal cannula  Hydration status: acceptable  Comments: This patient remained on the stretcher. The patient was handed off to the endoscopy nursing team.  All questions regarding pre-, intra-, and postoperative care were answered.   Post anesthesia nausea and vomiting:  none      INITIAL Post-op Vital signs:   Vitals Value Taken Time   BP 94/67 02/28/23 1358   Temp     Pulse 106 02/28/23 1358   Resp 11 02/28/23 1358   SpO2 92 % 02/28/23 1358

## 2023-02-28 NOTE — PROGRESS NOTES
Problem: Pressure Injury - Risk of  Goal: *Prevention of pressure injury  Description: Document Jose Carlos Scale and appropriate interventions in the flowsheet. Outcome: Progressing Towards Goal  Note: Pressure Injury Interventions:  Sensory Interventions: Assess changes in LOC, Keep linens dry and wrinkle-free, Minimize linen layers, Pressure redistribution bed/mattress (bed type)    Moisture Interventions: Absorbent underpads, Internal/External urinary devices, Minimize layers    Activity Interventions: Pressure redistribution bed/mattress(bed type)    Mobility Interventions: Pressure redistribution bed/mattress (bed type)    Nutrition Interventions: Document food/fluid/supplement intake    Friction and Shear Interventions: Minimize layers                Problem: Falls - Risk of  Goal: *Absence of Falls  Description: Document Noa Fall Risk and appropriate interventions in the flowsheet.   Outcome: Progressing Towards Goal  Note: Fall Risk Interventions:  Mobility Interventions: Bed/chair exit alarm, Patient to call before getting OOB, Communicate number of staff needed for ambulation/transfer    Mentation Interventions: Adequate sleep, hydration, pain control, Bed/chair exit alarm, Door open when patient unattended, Room close to nurse's station    Medication Interventions: Bed/chair exit alarm, Patient to call before getting OOB         History of Falls Interventions: Bed/chair exit alarm, Door open when patient unattended, Room close to nurse's station

## 2023-02-28 NOTE — PROGRESS NOTES
Daily Progress Note: 2/28/2023      Subjective:     2/28223: Feels well. For EGD today. Hb stable. 2/27/2023-no new complaints. Not short of breath. Eating well. Refused work with physical therapy. Complaining of chronic pains and aches  2/26/2023-no new complaints. Eating well. Not short of breath. Limited activity. 2/25/2023-  Short of breath on exertion. Patient wants endoscopies and full work-up before discharge. 2/24/23-GI recommends outpatient evaluation; transfused 2 units PRBC  Shortness of breath on exertion  Oncology wants patient evaluated for GI bleed prior to discharge  2/23/2023-states that he had a good night of sleep. Feels a lot better. Not as short of breath. Did not cooperative physical therapy. Eating better. 2/22/2023-transfused 3 units PRBCs, reported hallucinations, reported back spasms and leg spasms-escalated need for pain medications. Received intermittent Lasix. Did not appreciate fluid restriction and salt restricted diet  Later seen by psychiatry. Placed him on amitriptyline; Valium for anxiety and risperidone for hallucinations    Hospital course to date:  Hollie Winston is a 60 y.o. male who presents with shortness of breath. He had past medical history of COPD and paraplegia. He reports to be experiencing worsening shortness of breath on exertion and must rest to regain his breath. He reports to be having difficult time forming sentences while short of breath. He reports chronic pain in his left axillae, left lower extremity and low back at the level of t10-l1 which apparently is the same level of his spinal damage. He is not very mobile and uses a wheel chair when not using leg bracing. He has history of amputations of the toes.      Medications reviewed  Current Facility-Administered Medications   Medication Dose Route Frequency    0.9% sodium chloride infusion 250 mL  250 mL IntraVENous PRN    oxyCODONE-acetaminophen (PERCOCET) 5-325 mg per tablet 1 Tablet  1 Tablet Oral Q6H PRN    amitriptyline (ELAVIL) tablet 25 mg  25 mg Oral QHS    diazePAM (VALIUM) tablet 2 mg  2 mg Oral Q6H PRN    risperiDONE (RisperDAL) tablet 1 mg  1 mg Oral QHS    ferrous sulfate tablet 325 mg  1 Tablet Oral DAILY WITH BREAKFAST    0.9% sodium chloride infusion 250 mL  250 mL IntraVENous PRN    sodium chloride (NS) flush 5-40 mL  5-40 mL IntraVENous Q8H    sodium chloride (NS) flush 5-40 mL  5-40 mL IntraVENous PRN    acetaminophen (TYLENOL) tablet 650 mg  650 mg Oral Q6H PRN    Or    acetaminophen (TYLENOL) suppository 650 mg  650 mg Rectal Q6H PRN    polyethylene glycol (MIRALAX) packet 17 g  17 g Oral DAILY PRN    promethazine (PHENERGAN) tablet 12.5 mg  12.5 mg Oral Q6H PRN    Or    ondansetron (ZOFRAN) injection 4 mg  4 mg IntraVENous Q6H PRN    melatonin tablet 6 mg  6 mg Oral QHS PRN    lidocaine 4 % patch 2 Patch  2 Patch TransDERmal Q12H PRN     Facility-Administered Medications Ordered in Other Encounters   Medication Dose Route Frequency    lactated Ringers infusion   IntraVENous CONTINUOUS       Review of Systems:   Pertinent items are noted in HPI. Objective:   Physical Exam:     Visit Vitals  /83 (BP 1 Location: Right upper arm, BP Patient Position: Sitting)   Pulse 90   Temp 98 °F (36.7 °C)   Resp 18   Ht 5' 8\" (1.727 m)   Wt 54.4 kg (120 lb)   SpO2 98%   BMI 18.25 kg/m²      O2 Device: None (Room air)    Temp (24hrs), Av.4 °F (36.9 °C), Min:98 °F (36.7 °C), Max:98.7 °F (37.1 °C)    No intake/output data recorded.  1901 -  0700  In: 444 [P.O.:444]  Out: -     General:   Awake and alert   Lungs:   Clear to auscultation bilaterally. Chest wall:  No tenderness or deformity. Heart:  Regular rate and rhythm, S1, S2 normal, no murmur, click, rub or gallop. Bibasilar crackles   Abdomen:   Soft, non-tender. Bowel sounds normal. No masses,  No organomegaly. Extremities: Extremities normal, atraumatic, no cyanosis or edema.    Pulses: 2+ and symmetric all extremities. Skin: Skin color, texture, turgor normal. No rashes or lesions   Neurologic: CNII-XII intact. No gross focal deficits    No gross lymphadenopathy          Data Review:       Recent Days:  Recent Labs     02/27/23  1030   WBC 9.2   HGB 9.3*   HCT 31.7*   *       Recent Labs     02/28/23  0916      K 3.9      CO2 30   GLU 94   BUN 7   CREA 0.53*   CA 8.5   ALB 2.8*   TBILI 1.0   ALT 78       24 Hour Results:  Recent Results (from the past 24 hour(s))   METABOLIC PANEL, COMPREHENSIVE    Collection Time: 02/28/23  9:16 AM   Result Value Ref Range    Sodium 142 136 - 145 mmol/L    Potassium 3.9 3.5 - 5.1 mmol/L    Chloride 108 97 - 108 mmol/L    CO2 30 21 - 32 mmol/L    Anion gap 4 (L) 5 - 15 mmol/L    Glucose 94 65 - 100 mg/dL    BUN 7 6 - 20 mg/dL    Creatinine 0.53 (L) 0.70 - 1.30 mg/dL    BUN/Creatinine ratio 13 12 - 20      eGFR >60 >60 ml/min/1.73m2    Calcium 8.5 8.5 - 10.1 mg/dL    Bilirubin, total 1.0 0.2 - 1.0 mg/dL    AST (SGOT) 26 15 - 37 U/L    ALT (SGPT) 78 12 - 78 U/L    Alk. phosphatase 101 45 - 117 U/L    Protein, total 6.3 (L) 6.4 - 8.2 g/dL    Albumin 2.8 (L) 3.5 - 5.0 g/dL    Globulin 3.5 2.0 - 4.0 g/dL    A-G Ratio 0.8 (L) 1.1 - 2.2           CTA CHEST W OR W WO CONT   Final Result      1. No evidence of pulmonary embolism. 2. Mild pulmonary edema in the lower lobes. Trace right pleural effusion. Trace   perihepatic ascites. 3. Cardiomegaly. Enlarged pulmonary arteries, consistent with pulmonary arterial   hypertension. 4. Additional incidental findings as above. XR CHEST SNGL V   Final Result   Central pulmonary vascular congestion versus perihilar opacities   that can be seen with a viral process. Assessment:    Severe chronic anemia-s/p blood transfusion with iron deficiency; resolved.   Hemoglobin stable at 9.3  Chronic CHF, high-output secondary to severe anemia; symptom control better  History of major depression-followed by psychiatry and initiated on medications  Severe iron deficiency  Recurrent insomnia-initiated on medications  Protein calorie malnutrition  Paraparesis lower extremities  Bilateral foot drop  High lumbar low thoracic spinal fusion  Noncompliance    Plan:  Await ROXANA Dixon MD

## 2023-03-01 ENCOUNTER — APPOINTMENT (OUTPATIENT)
Dept: ENDOSCOPY | Age: 63
DRG: 377 | End: 2023-03-01
Attending: INTERNAL MEDICINE
Payer: MEDICARE

## 2023-03-01 ENCOUNTER — ANESTHESIA (OUTPATIENT)
Dept: ENDOSCOPY | Age: 63
DRG: 377 | End: 2023-03-01
Payer: OTHER MISCELLANEOUS

## 2023-03-01 ENCOUNTER — ANESTHESIA EVENT (OUTPATIENT)
Dept: ENDOSCOPY | Age: 63
DRG: 377 | End: 2023-03-01
Payer: OTHER MISCELLANEOUS

## 2023-03-01 LAB
ALBUMIN SERPL-MCNC: 2.7 G/DL (ref 3.5–5)
ALBUMIN/GLOB SERPL: 0.8 (ref 1.1–2.2)
ALP SERPL-CCNC: 101 U/L (ref 45–117)
ALT SERPL-CCNC: 65 U/L (ref 12–78)
ANION GAP SERPL CALC-SCNC: 3 MMOL/L (ref 5–15)
AST SERPL W P-5'-P-CCNC: 24 U/L (ref 15–37)
BASOPHILS # BLD: 0.1 K/UL (ref 0–0.1)
BASOPHILS NFR BLD: 1 % (ref 0–1)
BILIRUB SERPL-MCNC: 1.1 MG/DL (ref 0.2–1)
BUN SERPL-MCNC: 6 MG/DL (ref 6–20)
BUN/CREAT SERPL: 11 (ref 12–20)
CA-I BLD-MCNC: 8.7 MG/DL (ref 8.5–10.1)
CHLORIDE SERPL-SCNC: 106 MMOL/L (ref 97–108)
CO2 SERPL-SCNC: 31 MMOL/L (ref 21–32)
CREAT SERPL-MCNC: 0.53 MG/DL (ref 0.7–1.3)
DIFFERENTIAL METHOD BLD: ABNORMAL
EOSINOPHIL # BLD: 0.5 K/UL (ref 0–0.4)
EOSINOPHIL NFR BLD: 5 % (ref 0–7)
GLOBULIN SER CALC-MCNC: 3.3 G/DL (ref 2–4)
GLUCOSE SERPL-MCNC: 97 MG/DL (ref 65–100)
HCT VFR BLD AUTO: 30.1 % (ref 36.6–50.3)
HGB BLD-MCNC: 9 G/DL (ref 12.1–17)
IMM GRANULOCYTES # BLD AUTO: 0 K/UL
IMM GRANULOCYTES NFR BLD AUTO: 0 %
LYMPHOCYTES # BLD: 0.8 K/UL (ref 0.8–3.5)
LYMPHOCYTES NFR BLD: 9 % (ref 12–49)
MCH RBC QN AUTO: 26.1 PG (ref 26–34)
MCHC RBC AUTO-ENTMCNC: 29.9 G/DL (ref 30–36.5)
MCV RBC AUTO: 87.2 FL (ref 80–99)
MONOCYTES # BLD: 1 K/UL (ref 0–1)
MONOCYTES NFR BLD: 11 % (ref 5–13)
NEUTS SEG # BLD: 6.9 K/UL (ref 1.8–8)
NEUTS SEG NFR BLD: 74 % (ref 32–75)
NRBC # BLD: 0 K/UL (ref 0–0.01)
NRBC BLD-RTO: 0 PER 100 WBC
PLATELET # BLD AUTO: 105 K/UL (ref 150–400)
POTASSIUM SERPL-SCNC: 3.5 MMOL/L (ref 3.5–5.1)
PROT SERPL-MCNC: 6 G/DL (ref 6.4–8.2)
RBC # BLD AUTO: 3.45 M/UL (ref 4.1–5.7)
RBC MORPH BLD: ABNORMAL
SODIUM SERPL-SCNC: 140 MMOL/L (ref 136–145)
WBC # BLD AUTO: 9.3 K/UL (ref 4.1–11.1)

## 2023-03-01 PROCEDURE — 85025 COMPLETE CBC W/AUTO DIFF WBC: CPT

## 2023-03-01 PROCEDURE — 74011250636 HC RX REV CODE- 250/636: Performed by: NURSE PRACTITIONER

## 2023-03-01 PROCEDURE — 74011250637 HC RX REV CODE- 250/637: Performed by: INTERNAL MEDICINE

## 2023-03-01 PROCEDURE — 2709999900 HC NON-CHARGEABLE SUPPLY: Performed by: INTERNAL MEDICINE

## 2023-03-01 PROCEDURE — 36415 COLL VENOUS BLD VENIPUNCTURE: CPT

## 2023-03-01 PROCEDURE — 0DJD8ZZ INSPECTION OF LOWER INTESTINAL TRACT, VIA NATURAL OR ARTIFICIAL OPENING ENDOSCOPIC: ICD-10-PCS | Performed by: INTERNAL MEDICINE

## 2023-03-01 PROCEDURE — 76060000032 HC ANESTHESIA 0.5 TO 1 HR: Performed by: INTERNAL MEDICINE

## 2023-03-01 PROCEDURE — 74011000250 HC RX REV CODE- 250: Performed by: INTERNAL MEDICINE

## 2023-03-01 PROCEDURE — 76040000007: Performed by: INTERNAL MEDICINE

## 2023-03-01 PROCEDURE — 80053 COMPREHEN METABOLIC PANEL: CPT

## 2023-03-01 PROCEDURE — 65660000001 HC RM ICU INTERMED STEPDOWN

## 2023-03-01 PROCEDURE — 74011250637 HC RX REV CODE- 250/637: Performed by: PSYCHIATRY & NEUROLOGY

## 2023-03-01 RX ORDER — PANTOPRAZOLE SODIUM 40 MG/1
40 TABLET, DELAYED RELEASE ORAL 2 TIMES DAILY
Status: DISCONTINUED | OUTPATIENT
Start: 2023-03-01 | End: 2023-03-02 | Stop reason: HOSPADM

## 2023-03-01 RX ORDER — PROPOFOL 10 MG/ML
INJECTION, EMULSION INTRAVENOUS
Status: DISPENSED
Start: 2023-03-01 | End: 2023-03-02

## 2023-03-01 RX ORDER — SODIUM CHLORIDE, SODIUM LACTATE, POTASSIUM CHLORIDE, CALCIUM CHLORIDE 600; 310; 30; 20 MG/100ML; MG/100ML; MG/100ML; MG/100ML
INJECTION, SOLUTION INTRAVENOUS
Status: DISCONTINUED | OUTPATIENT
Start: 2023-03-01 | End: 2023-03-01 | Stop reason: HOSPADM

## 2023-03-01 RX ORDER — PROPOFOL 10 MG/ML
INJECTION, EMULSION INTRAVENOUS AS NEEDED
Status: DISCONTINUED | OUTPATIENT
Start: 2023-03-01 | End: 2023-03-01 | Stop reason: HOSPADM

## 2023-03-01 RX ORDER — LANOLIN ALCOHOL/MO/W.PET/CERES
1 CREAM (GRAM) TOPICAL 2 TIMES DAILY WITH MEALS
Status: DISCONTINUED | OUTPATIENT
Start: 2023-03-01 | End: 2023-03-02 | Stop reason: HOSPADM

## 2023-03-01 RX ORDER — NYSTATIN 100000 [USP'U]/ML
500000 SUSPENSION ORAL 4 TIMES DAILY
Status: DISCONTINUED | OUTPATIENT
Start: 2023-03-01 | End: 2023-03-02 | Stop reason: HOSPADM

## 2023-03-01 RX ADMIN — ACETAMINOPHEN 650 MG: 325 TABLET ORAL at 17:19

## 2023-03-01 RX ADMIN — NYSTATIN 500000 UNITS: 100000 SUSPENSION ORAL at 21:29

## 2023-03-01 RX ADMIN — OXYCODONE AND ACETAMINOPHEN 1 TABLET: 5; 325 TABLET ORAL at 07:56

## 2023-03-01 RX ADMIN — SODIUM PHOSPHATE 1 ENEMA: 7; 19 ENEMA RECTAL at 12:42

## 2023-03-01 RX ADMIN — PROPOFOL 50 MG: 10 INJECTION, EMULSION INTRAVENOUS at 14:43

## 2023-03-01 RX ADMIN — SODIUM CHLORIDE, PRESERVATIVE FREE 10 ML: 5 INJECTION INTRAVENOUS at 21:00

## 2023-03-01 RX ADMIN — SODIUM CHLORIDE, PRESERVATIVE FREE 10 ML: 5 INJECTION INTRAVENOUS at 13:22

## 2023-03-01 RX ADMIN — RISPERIDONE 1 MG: 1 TABLET ORAL at 21:29

## 2023-03-01 RX ADMIN — PROPOFOL 50 MG: 10 INJECTION, EMULSION INTRAVENOUS at 14:45

## 2023-03-01 RX ADMIN — NYSTATIN 500000 UNITS: 100000 SUSPENSION ORAL at 17:16

## 2023-03-01 RX ADMIN — AMITRIPTYLINE HYDROCHLORIDE 25 MG: 50 TABLET, FILM COATED ORAL at 21:29

## 2023-03-01 RX ADMIN — PROPOFOL 50 MG: 10 INJECTION, EMULSION INTRAVENOUS at 14:40

## 2023-03-01 RX ADMIN — FERROUS SULFATE TAB 325 MG (65 MG ELEMENTAL FE) 325 MG: 325 (65 FE) TAB at 07:58

## 2023-03-01 RX ADMIN — OXYCODONE AND ACETAMINOPHEN 1 TABLET: 5; 325 TABLET ORAL at 21:43

## 2023-03-01 RX ADMIN — SODIUM CHLORIDE, PRESERVATIVE FREE 10 ML: 5 INJECTION INTRAVENOUS at 05:10

## 2023-03-01 RX ADMIN — FERROUS SULFATE TAB 325 MG (65 MG ELEMENTAL FE) 325 MG: 325 (65 FE) TAB at 17:16

## 2023-03-01 RX ADMIN — DIAZEPAM 2 MG: 2 TABLET ORAL at 08:01

## 2023-03-01 RX ADMIN — SODIUM CHLORIDE, POTASSIUM CHLORIDE, SODIUM LACTATE AND CALCIUM CHLORIDE: 600; 310; 30; 20 INJECTION, SOLUTION INTRAVENOUS at 14:38

## 2023-03-01 RX ADMIN — PANTOPRAZOLE SODIUM 40 MG: 40 TABLET, DELAYED RELEASE ORAL at 21:29

## 2023-03-01 RX ADMIN — PROPOFOL 50 MG: 10 INJECTION, EMULSION INTRAVENOUS at 14:49

## 2023-03-01 NOTE — PROGRESS NOTES
Daily Progress Note: 3/1/2023      Subjective:     3/1/23: Poor prep yesterday. For reattempt of colonoscopy today. I spoke with Dr. Krystle Miguel. Patient states he now has liquid clear BM. EGD: gastritis, esophagitis (plaques possibly sujatha per Dr. Krystle Miguel). 2/28223: Feels well. For EGD today. Hb stable. 2/27/2023-no new complaints. Not short of breath. Eating well. Refused work with physical therapy. Complaining of chronic pains and aches  2/26/2023-no new complaints. Eating well. Not short of breath. Limited activity. 2/25/2023-  Short of breath on exertion. Patient wants endoscopies and full work-up before discharge. 2/24/23-GI recommends outpatient evaluation; transfused 2 units PRBC  Shortness of breath on exertion  Oncology wants patient evaluated for GI bleed prior to discharge  2/23/2023-states that he had a good night of sleep. Feels a lot better. Not as short of breath. Did not cooperative physical therapy. Eating better. 2/22/2023-transfused 3 units PRBCs, reported hallucinations, reported back spasms and leg spasms-escalated need for pain medications. Received intermittent Lasix. Did not appreciate fluid restriction and salt restricted diet  Later seen by psychiatry. Placed him on amitriptyline; Valium for anxiety and risperidone for hallucinations    Hospital course to date:  Hollie Winston is a 60 y.o. male who presents with shortness of breath. He had past medical history of COPD and paraplegia. He reports to be experiencing worsening shortness of breath on exertion and must rest to regain his breath. He reports to be having difficult time forming sentences while short of breath. He reports chronic pain in his left axillae, left lower extremity and low back at the level of t10-l1 which apparently is the same level of his spinal damage. He is not very mobile and uses a wheel chair when not using leg bracing. He has history of amputations of the toes.      Medications reviewed  Current Facility-Administered Medications   Medication Dose Route Frequency    propofoL (DIPRIVAN) 10 mg/mL injection        0.9% sodium chloride infusion 250 mL  250 mL IntraVENous PRN    oxyCODONE-acetaminophen (PERCOCET) 5-325 mg per tablet 1 Tablet  1 Tablet Oral Q6H PRN    amitriptyline (ELAVIL) tablet 25 mg  25 mg Oral QHS    diazePAM (VALIUM) tablet 2 mg  2 mg Oral Q6H PRN    risperiDONE (RisperDAL) tablet 1 mg  1 mg Oral QHS    ferrous sulfate tablet 325 mg  1 Tablet Oral DAILY WITH BREAKFAST    0.9% sodium chloride infusion 250 mL  250 mL IntraVENous PRN    sodium chloride (NS) flush 5-40 mL  5-40 mL IntraVENous Q8H    sodium chloride (NS) flush 5-40 mL  5-40 mL IntraVENous PRN    acetaminophen (TYLENOL) tablet 650 mg  650 mg Oral Q6H PRN    Or    acetaminophen (TYLENOL) suppository 650 mg  650 mg Rectal Q6H PRN    polyethylene glycol (MIRALAX) packet 17 g  17 g Oral DAILY PRN    promethazine (PHENERGAN) tablet 12.5 mg  12.5 mg Oral Q6H PRN    Or    ondansetron (ZOFRAN) injection 4 mg  4 mg IntraVENous Q6H PRN    melatonin tablet 6 mg  6 mg Oral QHS PRN    lidocaine 4 % patch 2 Patch  2 Patch TransDERmal Q12H PRN       Review of Systems:   Pertinent items are noted in HPI. Objective:   Physical Exam:     Visit Vitals  BP (!) 100/58   Pulse 89   Temp 99.1 °F (37.3 °C)   Resp 22   Ht 5' 8\" (1.727 m)   Wt 54.4 kg (120 lb)   SpO2 97%   BMI 18.25 kg/m²    O2 Flow Rate (L/min): 4 l/min O2 Device: None (Room air)    Temp (24hrs), Av.2 °F (36.8 °C), Min:97.7 °F (36.5 °C), Max:99.1 °F (37.3 °C)    701 - 1900  In: 200 [I.V.:200]  Out: -    1901 -  0700  In: 200 [I.V.:200]  Out: -     General:   Awake and alert   Lungs:   Clear to auscultation bilaterally. Chest wall:  No tenderness or deformity. Heart:  Regular rate and rhythm, S1, S2 normal, no murmur, click, rub or gallop. Bibasilar crackles   Abdomen:   Soft, non-tender.  Bowel sounds normal. No masses,  No organomegaly. Extremities: Extremities normal, atraumatic, no cyanosis or edema. Pulses: 2+ and symmetric all extremities. Skin: Skin color, texture, turgor normal. No rashes or lesions   Neurologic: CNII-XII intact. No gross focal deficits    No gross lymphadenopathy          Data Review:       Recent Days:  Recent Labs     03/01/23  0729 02/27/23  1030   WBC 9.3 9.2   HGB 9.0* 9.3*   HCT 30.1* 31.7*   * 107*       Recent Labs     03/01/23  0729 02/28/23  0916    142   K 3.5 3.9    108   CO2 31 30   GLU 97 94   BUN 6 7   CREA 0.53* 0.53*   CA 8.7 8.5   ALB 2.7* 2.8*   TBILI 1.1* 1.0   ALT 65 78         24 Hour Results:  Recent Results (from the past 24 hour(s))   METABOLIC PANEL, COMPREHENSIVE    Collection Time: 03/01/23  7:29 AM   Result Value Ref Range    Sodium 140 136 - 145 mmol/L    Potassium 3.5 3.5 - 5.1 mmol/L    Chloride 106 97 - 108 mmol/L    CO2 31 21 - 32 mmol/L    Anion gap 3 (L) 5 - 15 mmol/L    Glucose 97 65 - 100 mg/dL    BUN 6 6 - 20 mg/dL    Creatinine 0.53 (L) 0.70 - 1.30 mg/dL    BUN/Creatinine ratio 11 (L) 12 - 20      eGFR >60 >60 ml/min/1.73m2    Calcium 8.7 8.5 - 10.1 mg/dL    Bilirubin, total 1.1 (H) 0.2 - 1.0 mg/dL    AST (SGOT) 24 15 - 37 U/L    ALT (SGPT) 65 12 - 78 U/L    Alk.  phosphatase 101 45 - 117 U/L    Protein, total 6.0 (L) 6.4 - 8.2 g/dL    Albumin 2.7 (L) 3.5 - 5.0 g/dL    Globulin 3.3 2.0 - 4.0 g/dL    A-G Ratio 0.8 (L) 1.1 - 2.2     CBC WITH AUTOMATED DIFF    Collection Time: 03/01/23  7:29 AM   Result Value Ref Range    WBC 9.3 4.1 - 11.1 K/uL    RBC 3.45 (L) 4.10 - 5.70 M/uL    HGB 9.0 (L) 12.1 - 17.0 g/dL    HCT 30.1 (L) 36.6 - 50.3 %    MCV 87.2 80.0 - 99.0 FL    MCH 26.1 26.0 - 34.0 PG    MCHC 29.9 (L) 30.0 - 36.5 g/dL    PLATELET 222 (L) 085 - 400 K/uL    NRBC 0.0 0.0  WBC    ABSOLUTE NRBC 0.00 0.00 - 0.01 K/uL    NEUTROPHILS 74 32 - 75 %    LYMPHOCYTES 9 (L) 12 - 49 %    MONOCYTES 11 5 - 13 %    EOSINOPHILS 5 0 - 7 %    BASOPHILS 1 0 - 1 %    IMMATURE GRANULOCYTES 0 %    ABS. NEUTROPHILS 6.9 1.8 - 8.0 K/UL    ABS. LYMPHOCYTES 0.8 0.8 - 3.5 K/UL    ABS. MONOCYTES 1.0 0.0 - 1.0 K/UL    ABS. EOSINOPHILS 0.5 (H) 0.0 - 0.4 K/UL    ABS. BASOPHILS 0.1 0.0 - 0.1 K/UL    ABS. IMM. GRANS. 0.0 K/UL    DF Smear Scanned      RBC COMMENTS Anisocytosis  1+        RBC COMMENTS Microcytosis  1+        RBC COMMENTS Polychromasia  1+             CTA CHEST W OR W WO CONT   Final Result      1. No evidence of pulmonary embolism. 2. Mild pulmonary edema in the lower lobes. Trace right pleural effusion. Trace   perihepatic ascites. 3. Cardiomegaly. Enlarged pulmonary arteries, consistent with pulmonary arterial   hypertension. 4. Additional incidental findings as above. XR CHEST SNGL V   Final Result   Central pulmonary vascular congestion versus perihilar opacities   that can be seen with a viral process. Assessment:    Severe chronic anemia-s/p blood transfusion with iron deficiency; resolved.   Hemoglobin stable at 9.3  Chronic CHF, high-output secondary to severe anemia; symptom control better  History of major depression-followed by psychiatry and initiated on medications  Severe iron deficiency  Recurrent insomnia-initiated on medications  Protein calorie malnutrition  Paraparesis lower extremities  Bilateral foot drop  High lumbar low thoracic spinal fusion  Noncompliance    EGD 2/28/23:  Gastritis/Esophagitis  Possible Esophageal Thrush    Plan:  Await repeat colonoscopy attempt  Nystatin  PPI    Elizabeth Ochoa MD

## 2023-03-01 NOTE — ANESTHESIA PREPROCEDURE EVALUATION
Relevant Problems   HEMATOLOGY   (+) Severe anemia     EGD/CLN yesterday (2/28/2023) with 200mg propofol    Anesthetic History   No history of anesthetic complications            Review of Systems / Medical History  Patient summary reviewed and pertinent labs reviewed    Pulmonary    COPD: mild               Neuro/Psych   Within defined limits           Cardiovascular  Within defined limits                     GI/Hepatic/Renal  Within defined limits              Endo/Other        Anemia     Other Findings            No past medical history on file. Past Surgical History:   Procedure Laterality Date    COLONOSCOPY N/A 2/28/2023    COLONOSCOPY performed by Criss Gonzalez MD at [place] [id number]       No current outpatient medications    No current facility-administered medications for this visit. No current outpatient medications on file.      Facility-Administered Medications Ordered in Other Visits   Medication Dose Route Frequency    0.9% sodium chloride infusion 250 mL  250 mL IntraVENous PRN    oxyCODONE-acetaminophen (PERCOCET) 5-325 mg per tablet 1 Tablet  1 Tablet Oral Q6H PRN    amitriptyline (ELAVIL) tablet 25 mg  25 mg Oral QHS    diazePAM (VALIUM) tablet 2 mg  2 mg Oral Q6H PRN    risperiDONE (RisperDAL) tablet 1 mg  1 mg Oral QHS    ferrous sulfate tablet 325 mg  1 Tablet Oral DAILY WITH BREAKFAST    0.9% sodium chloride infusion 250 mL  250 mL IntraVENous PRN    sodium chloride (NS) flush 5-40 mL  5-40 mL IntraVENous Q8H    sodium chloride (NS) flush 5-40 mL  5-40 mL IntraVENous PRN    acetaminophen (TYLENOL) tablet 650 mg  650 mg Oral Q6H PRN    Or    acetaminophen (TYLENOL) suppository 650 mg  650 mg Rectal Q6H PRN    polyethylene glycol (MIRALAX) packet 17 g  17 g Oral DAILY PRN    promethazine (PHENERGAN) tablet 12.5 mg  12.5 mg Oral Q6H PRN    Or    ondansetron (ZOFRAN) injection 4 mg  4 mg IntraVENous Q6H PRN    melatonin tablet 6 mg  6 mg Oral QHS PRN    lidocaine 4 % patch 2 Patch  2 Patch TransDERmal Q12H PRN       No data found.     Lab Results   Component Value Date/Time    WBC 9.3 03/01/2023 07:29 AM    HGB 9.0 (L) 03/01/2023 07:29 AM    HCT 30.1 (L) 03/01/2023 07:29 AM    PLATELET 746 (L) 15/66/3202 07:29 AM    MCV 87.2 03/01/2023 07:29 AM     Lab Results   Component Value Date/Time    Sodium 140 03/01/2023 07:29 AM    Potassium 3.5 03/01/2023 07:29 AM    Chloride 106 03/01/2023 07:29 AM    CO2 31 03/01/2023 07:29 AM    Anion gap 3 (L) 03/01/2023 07:29 AM    Glucose 97 03/01/2023 07:29 AM    BUN 6 03/01/2023 07:29 AM    Creatinine 0.53 (L) 03/01/2023 07:29 AM    BUN/Creatinine ratio 11 (L) 03/01/2023 07:29 AM    Calcium 8.7 03/01/2023 07:29 AM     No results found for: APTT, PTP, INR, INREXT, INREXT  Lab Results   Component Value Date/Time    Glucose 97 03/01/2023 07:29 AM         Physical Exam    Airway  Mallampati: III  TM Distance: 4 - 6 cm  Neck ROM: normal range of motion   Mouth opening: Normal     Cardiovascular    Rhythm: regular  Rate: normal         Dental    Dentition: Edentulous     Pulmonary  Breath sounds clear to auscultation               Abdominal  GI exam deferred       Other Findings            Anesthetic Plan    ASA: 3  Anesthesia type: total IV anesthesia and MAC    Monitoring Plan: Continuous noninvasive hemodynamic monitoring      Induction: Intravenous  Anesthetic plan and risks discussed with: Patient

## 2023-03-01 NOTE — PROGRESS NOTES
Comprehensive Nutrition Assessment    Type and Reason for Visit: Initial, Positive nutrition screen (BMI <18)    Nutrition Recommendations/Plan:   Liberalize to regular diet w/ 2000ml fluid restriction, after colonoscopy, as medically appropriate  Continue to monitor, document intake, wts, bms in I/Os thanks     Malnutrition Assessment:  Malnutrition Status:  No malnutrition (02/22/23 1412)    Context:  Acute illness     Findings of the 6 clinical characteristics of malnutrition:   Energy Intake:  No significant decrease in energy intake  Weight Loss:  No significant weight loss     Body Fat Loss:  Unable to assess,     Muscle Mass Loss:  Unable to assess,    Fluid Accumulation:  Unable to assess,     Strength:  Not performed     Nutrition Assessment:    Admitted for severe anemia (H/H 2.0/8.2) presenting w/ SOB and chest pain. (2/22) Received RBC transfusion. Per pt, #, denies changes to wt, appetite/PO intake [id number]. Reports drinking 1/2-gal milk every 2 days, 32 oz orange juice daily, eating fried foods/fast food, 1-2 meals/day PTA. Denied alcohol consumption. Endorsed eating 100% of all bfast this am. Continue w/ current diet.(3/1) Hbg stable. Unable to see pt d/t prepareations for colonoscopy, EGD today. RN team endorses strong appetite (% PO), non-compliance w/ diet recommendations, drinking OJ and fluids. No indication to continue cardiac diet w/ strict fluid restriction- no longer receiving diuretic meds, blood transfusions. Will plan to liberalize diet/fluids after colonoscopy. Labs: H/H 9.0/30.1, Gluc 97, Cr 0.53, Alb 2.7. Meds: amitriptyline, fe sulf, resperidone    Nutrition Related Findings:    NFPE deferred d/t pt agitated. Appears without visual signs muscle/fat wasting. No n/v, c/d, nor issues chewing/swallowing. No edema noted.  Wound Type: None    Current Nutrition Intake & Therapies:  Average Meal Intake: %  Average Supplement Intake: None ordered  ADULT DIET Regular; 2000 ml; strongly prefers orange juice, please add w/ meals    Anthropometric Measures:  Height: 5' 8\" (172.7 cm)  Ideal Body Weight (IBW): 154 lbs (70 kg)  Admission Body Weight: 120 lb  Current Body Wt:  54.4 kg (120 lb), 77.9 % IBW.  Stated (by pt)  Current BMI (kg/m2): 18.3  Usual Body Weight: 54.4 kg (120 lb)  % Weight Change (Calculated): 0  Weight Adjustment: Paraplegia  Total Amputation Percentage: 7.5  Adjusted Ideal Body Weight (lbs) (Calculated): 142.5  Adjusted Ideal Body Weight (kg) (Calculated): 64.77 kg  Adjusted % IBW (Calculated): 84.2  Adjusted BMI (Calculated): 19.7  BMI Category: Normal weight (BMI 18.5-24.9) (adj- paraplg)    Estimated Daily Nutrient Needs:  Energy Requirements Based On: Kcal/kg  Weight Used for Energy Requirements: Current  Energy (kcal/day): 2097-0129 kcal/day (30-35 kcal/kg, COPD)  Weight Used for Protein Requirements: Current  Protein (g/day): 55-65 g/dday (1.0-1.2 g/kg)  Method Used for Fluid Requirements: 1 ml/kcal (or per MD rec)  Fluid (ml/day): 3234-5422 ml/day    Nutrition Diagnosis:   Altered nutrition-related lab values related to other (specify) (undetermined at this time) as evidenced by lab values (Hgb 9.0, Hct 30.1)    Nutrition Interventions:   Food and/or Nutrient Delivery: Modify current diet  Nutrition Education/Counseling: No recommendations at this time  Coordination of Nutrition Care: Continue to monitor while inpatient  Plan of Care discussed with: Rn, Attending    Goals:  Goals: Meet at least 75% of estimated needs, by next RD assessment       Nutrition Monitoring and Evaluation:   Behavioral-Environmental Outcomes: None identified  Food/Nutrient Intake Outcomes: Food and nutrient intake  Physical Signs/Symptoms Outcomes: Biochemical data, Weight, Meal time behavior    Discharge Planning:    Continue current diet    Loan Alberto RD  Contact: Ext [phone] or via PS

## 2023-03-01 NOTE — BSMART NOTE
Initial BSMART Liaison Assessment Form     Section I - Integrated Summary    Chief Complaint is Hallucinations and restlessness. Referred for Psychiatric Consult by Hernandez Duenas for Hallucinations and restlessness. Seen by Dr. Ben Corbin for consult who dame the following disposition: 1. Start him on amitriptyline 25 mg at night for insomnia. 2.  Valium 2 mg every 4 hours p.r.n. for anxiety. 3.  I will place him on risperidone 1 mg at night to help hallucinations. LOS:  8     Presenting problem/Summary:  Pt reported some hallucinating and difficult time sleeping. Pt admitted to history of depression but currently denies depressive feelings and other depressive symptoms. Pt reported to some anxiety which he feels is causing the restlessness. Pt denied SI/HI, denied substance abuse, was pleasant and cooperative. Precipitant Factors are Pt is not currently taking medication for his Psychiatric needs. He reported that he has been off medication since November 2021. The information is given by the patient. Current Psychiatrist and/or  is none at this time. Previous Hospitalizations/Treatment: Pt has prior history of being treated for depression and anxiety. Lethality Assessment:  The potential for suicide noted by the following: note noted . The potential for homicide is not noted. The patient has not been a perpetrator of sexual or physical abuse. There are not pending charges. The patient is not felt to be at risk for self-harm or harm to others. Section II - Psychosocial  The patient's overall mood and attitude is calm, polite, cooperative and jovial.  Feelings of helplessness and hopelessness are not observed. Generalized anxiety is not observed. Panic is not observed. Phobias are not observed. Obsessive compulsive tendencies are not observed. Section III - Mental Status Exam  The patient's appearance shows no evidence of impairment.   The patient's behavior shows no evidence of impairment. The patient is oriented to time, place, person and situation. The patient's speech shows no evidence of impairment. The patient's mood is calm. The range of affect is congruent with mood. The patient's thought content demonstrates no evidence of impairment. The thought process shows no evidence of impairment. The patient's perception shows no evidence of impairment. The patient's memory shows no evidence of impairment. The patient's appetite shows no evidence of impairment. The patient's sleep has evidence of insomnia. The patient's insight shows no evidence of impairment. The patient's judgement shows no evidence of impairment. The patient has demonstrated mental capacity to provide informed consent. Section IV - Substance Abuse  The patient denied substance use. Section V - Medical  No past medical history on file. Section VI - Living Arrangements  The patient is single. The patient lives alone. The patient has three adult children. The patient does plan to return home upon discharge. The patient's source of income comes from disability. The patient's greatest support comes from his adult children. The patient has not been in an event described as horrible or outside the realm of ordinary life experience either currently or in the past. The patient has not been a victim of sexual/physical abuse. Section VII - Other Areas of Clinical Concern    The highest grade achieved is high school diploma. The patient is currently disabled and speaks [place] as a primary language. The patient has no communication impairments affecting communication. The patient's preference for learning can be described as: can read and write adequately.   The patient's hearing is normal.  The patient's vision is normal.    The patient reports coping skills include: playing with his grand kids     No additional follow up needed for pt as pt reported not need for brief therapy or support from liaison.      Chhaya Sellers, [address], [address] [place] [address]

## 2023-03-01 NOTE — PROGRESS NOTES
Hematology and Oncology Progress Note    Patient: Wili Gardiner MRN: [id number]  SSN: [id number]    YOB: 1960  Age: 60 y.o. Sex: male      Admit Date: 2/21/2023    LOS: 8 days     Chief Complaint: Patient was admitted with increasing shortness of breath and severe fatigue    Subjective:     Patient denies bleeding. Objective:     Vitals:    03/01/23 1456 03/01/23 1506 03/01/23 1510 03/01/23 1515   BP: (!) 101/59 (!) 92/52 (!) 97/59 (!) 100/58   Pulse: 95 91 89 89   Resp: 17 20 15 22   Temp:       SpO2: 100% 92% 97% 97%   Weight:       Height:          Physical Exam:  Constitutional: [de-identified] white male looks older for his age. Not in any acute distress or pain. His complexion is significantly pale. Eyes: Sclerae anicteric. Conjunctivae shows severe pallor. ENMT: Oral mucosa is moist, no thrush, mucositis, or petechiae. Neck: No adenopathy   Respiratory: Lungs are clear bilaterally. Cardiovascular: Sinus tachycardia with holosystolic murmur. Abdomen: Soft, nontender, no hepatosplenomegaly. No guarding or rigidity. Bowel sounds present. Extremities: No edema. Skin: No petechiae; no skin rash. Neurologic: Alert/oriented x 3. Has lower extremity weakness. Lab/Data Review:    Recent Results (from the past 24 hour(s))   METABOLIC PANEL, COMPREHENSIVE    Collection Time: 03/01/23  7:29 AM   Result Value Ref Range    Sodium 140 136 - 145 mmol/L    Potassium 3.5 3.5 - 5.1 mmol/L    Chloride 106 97 - 108 mmol/L    CO2 31 21 - 32 mmol/L    Anion gap 3 (L) 5 - 15 mmol/L    Glucose 97 65 - 100 mg/dL    BUN 6 6 - 20 mg/dL    Creatinine 0.53 (L) 0.70 - 1.30 mg/dL    BUN/Creatinine ratio 11 (L) 12 - 20      eGFR >60 >60 ml/min/1.73m2    Calcium 8.7 8.5 - 10.1 mg/dL    Bilirubin, total 1.1 (H) 0.2 - 1.0 mg/dL    AST (SGOT) 24 15 - 37 U/L    ALT (SGPT) 65 12 - 78 U/L    Alk.  phosphatase 101 45 - 117 U/L    Protein, total 6.0 (L) 6.4 - 8.2 g/dL    Albumin 2.7 (L) 3.5 - 5.0 g/dL    Globulin 3.3 2.0 - 4.0 g/dL    A-G Ratio 0.8 (L) 1.1 - 2.2     CBC WITH AUTOMATED DIFF    Collection Time: 03/01/23  7:29 AM   Result Value Ref Range    WBC 9.3 4.1 - 11.1 K/uL    RBC 3.45 (L) 4.10 - 5.70 M/uL    HGB 9.0 (L) 12.1 - 17.0 g/dL    HCT 30.1 (L) 36.6 - 50.3 %    MCV 87.2 80.0 - 99.0 FL    MCH 26.1 26.0 - 34.0 PG    MCHC 29.9 (L) 30.0 - 36.5 g/dL    PLATELET 529 (L) 713 - 400 K/uL    NRBC 0.0 0.0  WBC    ABSOLUTE NRBC 0.00 0.00 - 0.01 K/uL    NEUTROPHILS 74 32 - 75 %    LYMPHOCYTES 9 (L) 12 - 49 %    MONOCYTES 11 5 - 13 %    EOSINOPHILS 5 0 - 7 %    BASOPHILS 1 0 - 1 %    IMMATURE GRANULOCYTES 0 %    ABS. NEUTROPHILS 6.9 1.8 - 8.0 K/UL    ABS. LYMPHOCYTES 0.8 0.8 - 3.5 K/UL    ABS. MONOCYTES 1.0 0.0 - 1.0 K/UL    ABS. EOSINOPHILS 0.5 (H) 0.0 - 0.4 K/UL    ABS. BASOPHILS 0.1 0.0 - 0.1 K/UL    ABS. IMM. GRANS. 0.0 K/UL    DF Smear Scanned      RBC COMMENTS Anisocytosis  1+        RBC COMMENTS Microcytosis  1+        RBC COMMENTS Polychromasia  1+                 Assessment and plan:     58-year-old white male with history of COPD and paraplegia came with shortness of breath and was found to have symptomatic anemia. Iron deficiency anemia: Status post 5 units of PRBC. Patient hemoglobin stable. Monitor. Patient seen by GI.  EGD showed gastritis,esophagitis and possible candidiasis . patient getting colonoscopy today. Transfuse PRBC for hemoglobin less than 7 or symptomatic. Pt do not want IV iron. Continue ferrous sulfate. Thrombocytopenia: Mild. Possible dilutional.  Patient also on Protonix and risperidone which can cause thrombocytopenia .monitor. Check peripheral smear and serum protein electrophoresis. B12 and folate levels adequate. Follow-up with Dr. Koby Napoles in 2 weeks. This dictation was done by dragon, computer voice recognition software. Often unanticipated grammatical, syntax, phones and other interpretive errors are inadvertently transcribed.   Please excuse errors that have escaped final proofreading.        Signed By: Latoya Moise MD     March 1, 2023

## 2023-03-01 NOTE — ANESTHESIA PREPROCEDURE EVALUATION
Relevant Problems   HEMATOLOGY   (+) Severe anemia     EGD/CLN yesterday (2/28/2023) with 200mg propofol    Anesthetic History   No history of anesthetic complications            Review of Systems / Medical History  Patient summary reviewed and pertinent labs reviewed    Pulmonary    COPD: mild               Neuro/Psych   Within defined limits           Cardiovascular  Within defined limits                     GI/Hepatic/Renal  Within defined limits              Endo/Other        Anemia     Other Findings            No past medical history on file. Past Surgical History:   Procedure Laterality Date    COLONOSCOPY N/A 2/28/2023    COLONOSCOPY performed by Deri Litten, MD at [place] [address]       No current outpatient medications    No current facility-administered medications for this visit. No current outpatient medications on file.      Facility-Administered Medications Ordered in Other Visits   Medication Dose Route Frequency    0.9% sodium chloride infusion 250 mL  250 mL IntraVENous PRN    oxyCODONE-acetaminophen (PERCOCET) 5-325 mg per tablet 1 Tablet  1 Tablet Oral Q6H PRN    amitriptyline (ELAVIL) tablet 25 mg  25 mg Oral QHS    diazePAM (VALIUM) tablet 2 mg  2 mg Oral Q6H PRN    risperiDONE (RisperDAL) tablet 1 mg  1 mg Oral QHS    ferrous sulfate tablet 325 mg  1 Tablet Oral DAILY WITH BREAKFAST    0.9% sodium chloride infusion 250 mL  250 mL IntraVENous PRN    sodium chloride (NS) flush 5-40 mL  5-40 mL IntraVENous Q8H    sodium chloride (NS) flush 5-40 mL  5-40 mL IntraVENous PRN    acetaminophen (TYLENOL) tablet 650 mg  650 mg Oral Q6H PRN    Or    acetaminophen (TYLENOL) suppository 650 mg  650 mg Rectal Q6H PRN    polyethylene glycol (MIRALAX) packet 17 g  17 g Oral DAILY PRN    promethazine (PHENERGAN) tablet 12.5 mg  12.5 mg Oral Q6H PRN    Or    ondansetron (ZOFRAN) injection 4 mg  4 mg IntraVENous Q6H PRN    melatonin tablet 6 mg  6 mg Oral QHS PRN    lidocaine 4 % patch 2 Patch  2 Patch TransDERmal Q12H PRN       No data found.     Lab Results   Component Value Date/Time    WBC 9.3 03/01/2023 07:29 AM    HGB 9.0 (L) 03/01/2023 07:29 AM    HCT 30.1 (L) 03/01/2023 07:29 AM    PLATELET 419 (L) 39/02/6241 07:29 AM    MCV 87.2 03/01/2023 07:29 AM     Lab Results   Component Value Date/Time    Sodium 140 03/01/2023 07:29 AM    Potassium 3.5 03/01/2023 07:29 AM    Chloride 106 03/01/2023 07:29 AM    CO2 31 03/01/2023 07:29 AM    Anion gap 3 (L) 03/01/2023 07:29 AM    Glucose 97 03/01/2023 07:29 AM    BUN 6 03/01/2023 07:29 AM    Creatinine 0.53 (L) 03/01/2023 07:29 AM    BUN/Creatinine ratio 11 (L) 03/01/2023 07:29 AM    Calcium 8.7 03/01/2023 07:29 AM     No results found for: APTT, PTP, INR, INREXT, INREXT  Lab Results   Component Value Date/Time    Glucose 97 03/01/2023 07:29 AM         Physical Exam    Airway  Mallampati: III  TM Distance: 4 - 6 cm  Neck ROM: normal range of motion   Mouth opening: Normal     Cardiovascular    Rhythm: regular  Rate: normal         Dental    Dentition: Edentulous     Pulmonary  Breath sounds clear to auscultation               Abdominal  GI exam deferred       Other Findings            Anesthetic Plan    ASA: 3  Anesthesia type: total IV anesthesia and MAC    Monitoring Plan: Continuous noninvasive hemodynamic monitoring      Induction: Intravenous  Anesthetic plan and risks discussed with: Patient

## 2023-03-01 NOTE — PROGRESS NOTES
Problem: Pressure Injury - Risk of  Goal: *Prevention of pressure injury  Description: Document Jose Carlos Scale and appropriate interventions in the flowsheet. Outcome: Progressing Towards Goal  Note: Pressure Injury Interventions:  Sensory Interventions: Minimize linen layers, Assess changes in LOC    Moisture Interventions: Absorbent underpads    Activity Interventions: Assess need for specialty bed    Mobility Interventions: HOB 30 degrees or less    Nutrition Interventions: Document food/fluid/supplement intake    Friction and Shear Interventions: Feet elevated on foot rest, Apply protective barrier, creams and emollients                Problem: Falls - Risk of  Goal: *Absence of Falls  Description: Document Noa Fall Risk and appropriate interventions in the flowsheet.   Outcome: Progressing Towards Goal  Note: Fall Risk Interventions:  Mobility Interventions: Bed/chair exit alarm, Communicate number of staff needed for ambulation/transfer, Patient to call before getting OOB    Mentation Interventions: Bed/chair exit alarm, Door open when patient unattended, Room close to nurse's station, Toileting rounds    Medication Interventions: Bed/chair exit alarm, Patient to call before getting OOB    Elimination Interventions: Call light in reach, Bed/chair exit alarm, Patient to call for help with toileting needs    History of Falls Interventions: Bed/chair exit alarm, Room close to nurse's station, Door open when patient unattended

## 2023-03-02 VITALS
DIASTOLIC BLOOD PRESSURE: 83 MMHG | SYSTOLIC BLOOD PRESSURE: 124 MMHG | HEIGHT: 68 IN | BODY MASS INDEX: 18.19 KG/M2 | RESPIRATION RATE: 16 BRPM | TEMPERATURE: 98 F | HEART RATE: 98 BPM | WEIGHT: 120 LBS | OXYGEN SATURATION: 99 %

## 2023-03-02 PROCEDURE — 74011250637 HC RX REV CODE- 250/637: Performed by: INTERNAL MEDICINE

## 2023-03-02 PROCEDURE — 74011000250 HC RX REV CODE- 250: Performed by: INTERNAL MEDICINE

## 2023-03-02 RX ORDER — PANTOPRAZOLE SODIUM 40 MG/1
40 TABLET, DELAYED RELEASE ORAL 2 TIMES DAILY
Qty: 60 TABLET | Refills: 0 | Status: SHIPPED | OUTPATIENT
Start: 2023-03-02

## 2023-03-02 RX ORDER — LANOLIN ALCOHOL/MO/W.PET/CERES
325 CREAM (GRAM) TOPICAL 2 TIMES DAILY WITH MEALS
Qty: 60 TABLET | Refills: 0 | Status: SHIPPED | OUTPATIENT
Start: 2023-03-02

## 2023-03-02 RX ORDER — NYSTATIN 100000 [USP'U]/ML
500000 SUSPENSION ORAL 4 TIMES DAILY
Qty: 150 ML | Refills: 0 | Status: SHIPPED | OUTPATIENT
Start: 2023-03-02

## 2023-03-02 RX ORDER — RISPERIDONE 1 MG/1
1 TABLET, FILM COATED ORAL
Qty: 30 TABLET | Refills: 0 | Status: SHIPPED | OUTPATIENT
Start: 2023-03-02

## 2023-03-02 RX ORDER — AMITRIPTYLINE HYDROCHLORIDE 25 MG/1
25 TABLET, FILM COATED ORAL
Qty: 30 TABLET | Refills: 0 | Status: SHIPPED | OUTPATIENT
Start: 2023-03-02

## 2023-03-02 RX ADMIN — NYSTATIN 500000 UNITS: 100000 SUSPENSION ORAL at 08:23

## 2023-03-02 RX ADMIN — PANTOPRAZOLE SODIUM 40 MG: 40 TABLET, DELAYED RELEASE ORAL at 08:24

## 2023-03-02 RX ADMIN — SODIUM CHLORIDE, PRESERVATIVE FREE 10 ML: 5 INJECTION INTRAVENOUS at 05:37

## 2023-03-02 RX ADMIN — FERROUS SULFATE TAB 325 MG (65 MG ELEMENTAL FE) 325 MG: 325 (65 FE) TAB at 08:24

## 2023-03-02 RX ADMIN — OXYCODONE AND ACETAMINOPHEN 1 TABLET: 5; 325 TABLET ORAL at 08:32

## 2023-03-02 NOTE — DISCHARGE SUMMARY
Hospitalist Discharge Summary     Patient ID:    Aakash Zarate  [id number]  60 y.o.  1960    Admit date: 2/21/2023    Discharge date : 3/2/2023      Final Diagnoses & Acute Mgmt:    Severe acute/chronic iron deficiency anemia-s/p 5U PRBC; Hb 9 now    - s/p EGD, Colonoscopy; see below  - Refused IV Iron  - Evaluated by GI and Heme    Chronic CHF, high-output secondary to severe anemia; symptom control better    Major depression and Insomnia    - Evaluated by Dr. Petra Shelley    Protein calorie malnutrition  Paraparesis lower extremities  Bilateral foot drop  High lumbar low thoracic spinal fusion  Noncompliance    Reason for Hospitalization & Hospital Course:     3/2/23: No new issue. S/p Colonoscopy yesterday showing AVMs without active bleeding per Dr. Carina Rivero.    3/1/23: Poor prep yesterday. For reattempt of colonoscopy today. I spoke with Dr. Carina Rivero. Patient states he now has liquid clear BM. EGD: gastritis, esophagitis (plaques possibly sujatha per Dr. Carina Rivero). 2/28223: Feels well. For EGD today. Hb stable. 2/27/2023-no new complaints. Not short of breath. Eating well. Refused work with physical therapy. Complaining of chronic pains and aches  2/26/2023-no new complaints. Eating well. Not short of breath. Limited activity. 2/25/2023-  Short of breath on exertion. Patient wants endoscopies and full work-up before discharge. 2/24/23-GI recommends outpatient evaluation; transfused 2 units PRBC  Shortness of breath on exertion  Oncology wants patient evaluated for GI bleed prior to discharge  2/23/2023-states that he had a good night of sleep. Feels a lot better. Not as short of breath. Did not cooperative physical therapy. Eating better. 2/22/2023-transfused 3 units PRBCs, reported hallucinations, reported back spasms and leg spasms-escalated need for pain medications. Received intermittent Lasix.   Did not appreciate fluid restriction and salt restricted diet  Later seen by psychiatry. Placed him on amitriptyline; Valium for anxiety and risperidone for hallucinations     Hospital course to date:  Jaylen Gutierres is a 60 y.o. male who presents with shortness of breath. He had past medical history of COPD and paraplegia. He reports to be experiencing worsening shortness of breath on exertion and must rest to regain his breath. He reports to be having difficult time forming sentences while short of breath. He reports chronic pain in his left axillae, left lower extremity and low back at the level of t10-l1 which apparently is the same level of his spinal damage. He is not very mobile and uses a wheel chair when not using leg bracing. He has history of amputations of the toes. Discharge Medications    Current Discharge Medication List        START taking these medications    Details   amitriptyline (ELAVIL) 25 mg tablet Take 1 Tablet by mouth nightly. Qty: 30 Tablet, Refills: 0  Start date: 3/2/2023      ferrous sulfate 325 mg (65 mg iron) tablet Take 1 Tablet by mouth two (2) times daily (with meals). Qty: 60 Tablet, Refills: 0  Start date: 3/2/2023      nystatin (MYCOSTATIN) 100,000 unit/mL suspension Take 5 mL by mouth four (4) times daily. swish and spit  Qty: 150 mL, Refills: 0  Start date: 3/2/2023      pantoprazole (PROTONIX) 40 mg tablet Take 1 Tablet by mouth two (2) times a day. Qty: 60 Tablet, Refills: 0  Start date: 3/2/2023      risperiDONE (RisperDAL) 1 mg tablet Take 1 Tablet by mouth nightly. Qty: 30 Tablet, Refills: 0  Start date: 3/2/2023             Follow up Care    1. Needs PCP; refer to Dr. Jewel Rosales or Liam Coronado, JOVANNI; 1 week or sooner  2. Dr. Koby Napoles, Hematology  3. Dr. Elvis Waddell  4. Dr. Mateo Wiley    Patient Follow Up Instructions:    Activity: Activity as tolerated  Diet:  Regular Diet    Condition at Discharge:  Stable    Disposition  Home or Self Care    Code Status:  Full Code    Discharge Exam:  Patient seen and examined by me on discharge day.    Constitutional: NAD, Awake, conversant, comfortable    Neck: Supple     Cardiovascular: S1S2, no murmur; Tele:    Respiratory:  CTA ant bilat; vesicular breath sounds, no overt wheeze     GI: Soft, NT; no guarding/rigidity; + bowel sounds    Neurological:  AxOx3; No new focal weakness; No overt tremors    Psychiatric: Appropriate mood; oriented, coherent/cogent at present    Skin: No new rash or significant discoloration     Vascular: Palpable pulses; No edema      CONSULTATIONS: GI, Hematology/Oncology, and Psychiatry    Significant Diagnostic Studies:   No results found for this or any previous visit (from the past 24 hour(s)). CTA CHEST W OR W WO CONT   Final Result      1. No evidence of pulmonary embolism. 2. Mild pulmonary edema in the lower lobes. Trace right pleural effusion. Trace   perihepatic ascites. 3. Cardiomegaly. Enlarged pulmonary arteries, consistent with pulmonary arterial   hypertension. 4. Additional incidental findings as above. XR CHEST SNGL V   Final Result   Central pulmonary vascular congestion versus perihilar opacities   that can be seen with a viral process.               Total DC Time and Coordination of Care: 25 mins    Signed:  Martina Friedman MD  3/2/2023  8:34 AM

## 2023-03-02 NOTE — PROGRESS NOTES
0930 States pain much better and is an 2 now. To be discharged home today. 1025 Patient up and about in room. Wears bilateral lower leg braces. IV access dc'ed and dgt-in-law Alyssa Khalil notified of discharge. Discharge instructions gone over with patient, signed and opportunity for questions done. 1045 Discharged home via w/c with personal belongings to privately owned vehicle.

## 2023-05-22 RX ORDER — FERROUS SULFATE 325(65) MG
325 TABLET ORAL 2 TIMES DAILY WITH MEALS
COMMUNITY
Start: 2023-03-02

## 2023-05-22 RX ORDER — AMITRIPTYLINE HYDROCHLORIDE 25 MG/1
1 TABLET, FILM COATED ORAL NIGHTLY
COMMUNITY
Start: 2023-03-02

## 2023-05-22 RX ORDER — RISPERIDONE 1 MG/1
1 TABLET ORAL NIGHTLY
COMMUNITY
Start: 2023-03-02

## 2023-05-22 RX ORDER — PANTOPRAZOLE SODIUM 40 MG/1
40 TABLET, DELAYED RELEASE ORAL 2 TIMES DAILY
COMMUNITY
Start: 2023-03-02

## 2024-02-07 ENCOUNTER — TELEPHONE (OUTPATIENT)
Facility: CLINIC | Age: 64
End: 2024-02-07

## 2024-02-07 NOTE — TELEPHONE ENCOUNTER
----- Message from Jae Lucero sent at 2/6/2024  5:01 PM EST -----  Subject: Referral Request    Reason for referral request? patient is being care for via work comp claim   staffed by Lo [phone] juliana is the nurse . She states   that the patient has be paraplegic since 9/02/1980. due to decrease in   mobility they are seeking a Physician order for a in home eval. they have   secured the company to go in home but are seeking a doctor referral to   initiate the service. States that the patient reports he has hx of care at   the practice and with Practitioner Manny. please call call to advise   regarding   Provider patient wants to be referred to(if known):     Provider Phone Number(if known):    Additional Information for Provider? Juliana - employer is -[employer]   [phone]   ---------------------------------------------------------------------------  --------------  CALL BACK INFO    [phone]; OK to leave message on voicemail  ---------------------------------------------------------------------------  --------------

## 2025-02-28 ENCOUNTER — HOSPITAL ENCOUNTER (INPATIENT)
Facility: HOSPITAL | Age: 65
LOS: 3 days | Discharge: HOME | DRG: 812 | End: 2025-03-03
Attending: STUDENT IN AN ORGANIZED HEALTH CARE EDUCATION/TRAINING PROGRAM | Admitting: STUDENT IN AN ORGANIZED HEALTH CARE EDUCATION/TRAINING PROGRAM
Payer: MEDICARE

## 2025-02-28 ENCOUNTER — APPOINTMENT (OUTPATIENT)
Facility: HOSPITAL | Age: 65
DRG: 812 | End: 2025-02-28
Payer: MEDICARE

## 2025-02-28 DIAGNOSIS — J10.1 INFLUENZA A: ICD-10-CM

## 2025-02-28 DIAGNOSIS — D64.9 ANEMIA, UNSPECIFIED TYPE: Primary | ICD-10-CM

## 2025-02-28 DIAGNOSIS — A41.9 SEPSIS, DUE TO UNSPECIFIED ORGANISM, UNSPECIFIED WHETHER ACUTE ORGAN DYSFUNCTION PRESENT (HCC): ICD-10-CM

## 2025-02-28 DIAGNOSIS — F41.9 ANXIETY: ICD-10-CM

## 2025-02-28 LAB
ALBUMIN SERPL-MCNC: 3.7 G/DL (ref 3.5–5)
ALBUMIN/GLOB SERPL: 1.1 (ref 1.1–2.2)
ALP SERPL-CCNC: 89 U/L (ref 45–117)
ALT SERPL-CCNC: 13 U/L (ref 12–78)
ANION GAP SERPL CALC-SCNC: 6 MMOL/L (ref 2–12)
AST SERPL W P-5'-P-CCNC: 19 U/L (ref 15–37)
BASE EXCESS BLD CALC-SCNC: 0.7 MMOL/L
BASOPHILS # BLD: 0.02 K/UL (ref 0–0.1)
BASOPHILS NFR BLD: 0.1 % (ref 0–1)
BILIRUB SERPL-MCNC: 0.8 MG/DL (ref 0.2–1)
BNP SERPL-MCNC: 277 PG/ML
BUN SERPL-MCNC: 13 MG/DL (ref 6–20)
BUN/CREAT SERPL: 17 (ref 12–20)
CA-I BLD-MCNC: 1.12 MMOL/L (ref 1.12–1.32)
CA-I BLD-MCNC: 8.6 MG/DL (ref 8.5–10.1)
CHLORIDE BLD-SCNC: 97 MMOL/L (ref 98–107)
CHLORIDE SERPL-SCNC: 100 MMOL/L (ref 97–108)
CO2 BLD-SCNC: 23 MMOL/L
CO2 SERPL-SCNC: 28 MMOL/L (ref 21–32)
CREAT SERPL-MCNC: 0.76 MG/DL (ref 0.7–1.3)
CREAT UR-MCNC: 0.79 MG/DL (ref 0.6–1.3)
DIFFERENTIAL METHOD BLD: ABNORMAL
EOSINOPHIL # BLD: 0.02 K/UL (ref 0–0.4)
EOSINOPHIL NFR BLD: 0.1 % (ref 0–7)
ERYTHROCYTE [DISTWIDTH] IN BLOOD BY AUTOMATED COUNT: 20.6 % (ref 11.5–14.5)
FLUAV RNA SPEC QL NAA+PROBE: DETECTED
FLUBV RNA SPEC QL NAA+PROBE: NOT DETECTED
GLOBULIN SER CALC-MCNC: 3.5 G/DL (ref 2–4)
GLUCOSE BLD STRIP.AUTO-MCNC: 131 MG/DL (ref 65–100)
GLUCOSE SERPL-MCNC: 115 MG/DL (ref 65–100)
HCO3 BLD-SCNC: 24.1 MMOL/L (ref 19–28)
HCT VFR BLD AUTO: 18.4 % (ref 36.6–50.3)
HGB BLD-MCNC: 4.7 G/DL (ref 12.1–17)
IMM GRANULOCYTES # BLD AUTO: 0.16 K/UL (ref 0–0.04)
IMM GRANULOCYTES NFR BLD AUTO: 1 % (ref 0–0.5)
LACTATE BLD-SCNC: 1.18 MMOL/L (ref 0.4–2)
LYMPHOCYTES # BLD: 0.59 K/UL (ref 0.8–3.5)
LYMPHOCYTES NFR BLD: 3.6 % (ref 12–49)
MAGNESIUM SERPL-MCNC: 1.8 MG/DL (ref 1.6–2.4)
MCH RBC QN AUTO: 15.2 PG (ref 26–34)
MCHC RBC AUTO-ENTMCNC: 25.5 G/DL (ref 30–36.5)
MCV RBC AUTO: 59.5 FL (ref 80–99)
MONOCYTES # BLD: 1.66 K/UL (ref 0–1)
MONOCYTES NFR BLD: 10.2 % (ref 5–13)
NEUTS SEG # BLD: 13.86 K/UL (ref 1.8–8)
NEUTS SEG NFR BLD: 85 % (ref 32–75)
NRBC # BLD: 0.13 K/UL (ref 0–0.01)
NRBC BLD-RTO: 0.8 PER 100 WBC
PCO2 BLD: 31.9 MMHG (ref 35–45)
PERFORMED BY:: ABNORMAL
PH BLD: 7.49 (ref 7.35–7.45)
PLATELET # BLD AUTO: 316 K/UL (ref 150–400)
PMV BLD AUTO: 9.8 FL (ref 8.9–12.9)
PO2 BLD: <27 MMHG (ref 75–100)
POTASSIUM BLD-SCNC: 3.2 MMOL/L (ref 3.5–5.5)
POTASSIUM SERPL-SCNC: 3.6 MMOL/L (ref 3.5–5.1)
PROCALCITONIN SERPL-MCNC: 0.72 NG/ML
PROT SERPL-MCNC: 7.2 G/DL (ref 6.4–8.2)
RBC # BLD AUTO: 3.09 M/UL (ref 4.1–5.7)
RBC MORPH BLD: ABNORMAL
RBC MORPH BLD: ABNORMAL
SARS-COV-2 RNA RESP QL NAA+PROBE: NOT DETECTED
SODIUM BLD-SCNC: 134 MMOL/L (ref 136–145)
SODIUM SERPL-SCNC: 134 MMOL/L (ref 136–145)
SPECIMEN SITE: ABNORMAL
TROPONIN I SERPL HS-MCNC: 14 NG/L (ref 0–76)
WBC # BLD AUTO: 16.3 K/UL (ref 4.1–11.1)

## 2025-02-28 PROCEDURE — 99285 EMERGENCY DEPT VISIT HI MDM: CPT

## 2025-02-28 PROCEDURE — 94762 N-INVAS EAR/PLS OXIMTRY CONT: CPT

## 2025-02-28 PROCEDURE — 83605 ASSAY OF LACTIC ACID: CPT

## 2025-02-28 PROCEDURE — 86901 BLOOD TYPING SEROLOGIC RH(D): CPT

## 2025-02-28 PROCEDURE — 86900 BLOOD TYPING SEROLOGIC ABO: CPT

## 2025-02-28 PROCEDURE — 82728 ASSAY OF FERRITIN: CPT

## 2025-02-28 PROCEDURE — 82947 ASSAY GLUCOSE BLOOD QUANT: CPT

## 2025-02-28 PROCEDURE — 82607 VITAMIN B-12: CPT

## 2025-02-28 PROCEDURE — 94761 N-INVAS EAR/PLS OXIMETRY MLT: CPT

## 2025-02-28 PROCEDURE — 93005 ELECTROCARDIOGRAM TRACING: CPT | Performed by: STUDENT IN AN ORGANIZED HEALTH CARE EDUCATION/TRAINING PROGRAM

## 2025-02-28 PROCEDURE — 80053 COMPREHEN METABOLIC PANEL: CPT

## 2025-02-28 PROCEDURE — 84484 ASSAY OF TROPONIN QUANT: CPT

## 2025-02-28 PROCEDURE — 84295 ASSAY OF SERUM SODIUM: CPT

## 2025-02-28 PROCEDURE — 94640 AIRWAY INHALATION TREATMENT: CPT

## 2025-02-28 PROCEDURE — 2000000000 HC ICU R&B

## 2025-02-28 PROCEDURE — 86850 RBC ANTIBODY SCREEN: CPT

## 2025-02-28 PROCEDURE — 96375 TX/PRO/DX INJ NEW DRUG ADDON: CPT

## 2025-02-28 PROCEDURE — 36415 COLL VENOUS BLD VENIPUNCTURE: CPT

## 2025-02-28 PROCEDURE — 84132 ASSAY OF SERUM POTASSIUM: CPT

## 2025-02-28 PROCEDURE — 2500000003 HC RX 250 WO HCPCS: Performed by: NURSE PRACTITIONER

## 2025-02-28 PROCEDURE — 82330 ASSAY OF CALCIUM: CPT

## 2025-02-28 PROCEDURE — 96374 THER/PROPH/DIAG INJ IV PUSH: CPT

## 2025-02-28 PROCEDURE — 85045 AUTOMATED RETICULOCYTE COUNT: CPT

## 2025-02-28 PROCEDURE — 2580000003 HC RX 258: Performed by: STUDENT IN AN ORGANIZED HEALTH CARE EDUCATION/TRAINING PROGRAM

## 2025-02-28 PROCEDURE — 30233N1 TRANSFUSION OF NONAUTOLOGOUS RED BLOOD CELLS INTO PERIPHERAL VEIN, PERCUTANEOUS APPROACH: ICD-10-PCS | Performed by: STUDENT IN AN ORGANIZED HEALTH CARE EDUCATION/TRAINING PROGRAM

## 2025-02-28 PROCEDURE — 87636 SARSCOV2 & INF A&B AMP PRB: CPT

## 2025-02-28 PROCEDURE — 83540 ASSAY OF IRON: CPT

## 2025-02-28 PROCEDURE — 2500000003 HC RX 250 WO HCPCS: Performed by: STUDENT IN AN ORGANIZED HEALTH CARE EDUCATION/TRAINING PROGRAM

## 2025-02-28 PROCEDURE — 84145 PROCALCITONIN (PCT): CPT

## 2025-02-28 PROCEDURE — 85025 COMPLETE CBC W/AUTO DIFF WBC: CPT

## 2025-02-28 PROCEDURE — 6370000000 HC RX 637 (ALT 250 FOR IP): Performed by: STUDENT IN AN ORGANIZED HEALTH CARE EDUCATION/TRAINING PROGRAM

## 2025-02-28 PROCEDURE — 6360000002 HC RX W HCPCS: Performed by: STUDENT IN AN ORGANIZED HEALTH CARE EDUCATION/TRAINING PROGRAM

## 2025-02-28 PROCEDURE — 82803 BLOOD GASES ANY COMBINATION: CPT

## 2025-02-28 PROCEDURE — 83735 ASSAY OF MAGNESIUM: CPT

## 2025-02-28 PROCEDURE — 83880 ASSAY OF NATRIURETIC PEPTIDE: CPT

## 2025-02-28 PROCEDURE — 87040 BLOOD CULTURE FOR BACTERIA: CPT

## 2025-02-28 PROCEDURE — 82746 ASSAY OF FOLIC ACID SERUM: CPT

## 2025-02-28 PROCEDURE — P9016 RBC LEUKOCYTES REDUCED: HCPCS

## 2025-02-28 PROCEDURE — 71045 X-RAY EXAM CHEST 1 VIEW: CPT

## 2025-02-28 PROCEDURE — 86923 COMPATIBILITY TEST ELECTRIC: CPT

## 2025-02-28 RX ORDER — ONDANSETRON 2 MG/ML
4 INJECTION INTRAMUSCULAR; INTRAVENOUS EVERY 6 HOURS PRN
Status: DISCONTINUED | OUTPATIENT
Start: 2025-02-28 | End: 2025-03-03 | Stop reason: HOSPADM

## 2025-02-28 RX ORDER — SODIUM CHLORIDE 9 MG/ML
INJECTION, SOLUTION INTRAVENOUS PRN
Status: DISCONTINUED | OUTPATIENT
Start: 2025-02-28 | End: 2025-03-03 | Stop reason: HOSPADM

## 2025-02-28 RX ORDER — POLYETHYLENE GLYCOL 3350 17 G/17G
17 POWDER, FOR SOLUTION ORAL DAILY PRN
Status: DISCONTINUED | OUTPATIENT
Start: 2025-02-28 | End: 2025-03-03 | Stop reason: HOSPADM

## 2025-02-28 RX ORDER — IPRATROPIUM BROMIDE AND ALBUTEROL SULFATE 2.5; .5 MG/3ML; MG/3ML
1 SOLUTION RESPIRATORY (INHALATION)
Status: COMPLETED | OUTPATIENT
Start: 2025-02-28 | End: 2025-02-28

## 2025-02-28 RX ORDER — POTASSIUM CHLORIDE 29.8 MG/ML
20 INJECTION INTRAVENOUS PRN
Status: DISCONTINUED | OUTPATIENT
Start: 2025-02-28 | End: 2025-03-03 | Stop reason: HOSPADM

## 2025-02-28 RX ORDER — MAGNESIUM SULFATE IN WATER 40 MG/ML
2000 INJECTION, SOLUTION INTRAVENOUS PRN
Status: DISCONTINUED | OUTPATIENT
Start: 2025-02-28 | End: 2025-03-03 | Stop reason: HOSPADM

## 2025-02-28 RX ORDER — MORPHINE SULFATE 4 MG/ML
4 INJECTION, SOLUTION INTRAMUSCULAR; INTRAVENOUS
Status: COMPLETED | OUTPATIENT
Start: 2025-02-28 | End: 2025-02-28

## 2025-02-28 RX ORDER — ACETAMINOPHEN 325 MG/1
650 TABLET ORAL EVERY 6 HOURS PRN
Status: DISCONTINUED | OUTPATIENT
Start: 2025-02-28 | End: 2025-03-03 | Stop reason: HOSPADM

## 2025-02-28 RX ORDER — 0.9 % SODIUM CHLORIDE 0.9 %
250 INTRAVENOUS SOLUTION INTRAVENOUS ONCE
Status: COMPLETED | OUTPATIENT
Start: 2025-02-28 | End: 2025-02-28

## 2025-02-28 RX ORDER — POTASSIUM CHLORIDE 7.45 MG/ML
10 INJECTION INTRAVENOUS PRN
Status: DISCONTINUED | OUTPATIENT
Start: 2025-02-28 | End: 2025-03-03 | Stop reason: HOSPADM

## 2025-02-28 RX ORDER — ONDANSETRON 4 MG/1
4 TABLET, ORALLY DISINTEGRATING ORAL EVERY 8 HOURS PRN
Status: DISCONTINUED | OUTPATIENT
Start: 2025-02-28 | End: 2025-03-03 | Stop reason: HOSPADM

## 2025-02-28 RX ORDER — SODIUM CHLORIDE 0.9 % (FLUSH) 0.9 %
5-40 SYRINGE (ML) INJECTION PRN
Status: DISCONTINUED | OUTPATIENT
Start: 2025-02-28 | End: 2025-03-03 | Stop reason: HOSPADM

## 2025-02-28 RX ORDER — SODIUM CHLORIDE 0.9 % (FLUSH) 0.9 %
5-40 SYRINGE (ML) INJECTION EVERY 12 HOURS SCHEDULED
Status: DISCONTINUED | OUTPATIENT
Start: 2025-02-28 | End: 2025-03-03 | Stop reason: HOSPADM

## 2025-02-28 RX ORDER — ACETAMINOPHEN 650 MG/1
650 SUPPOSITORY RECTAL EVERY 6 HOURS PRN
Status: DISCONTINUED | OUTPATIENT
Start: 2025-02-28 | End: 2025-03-03 | Stop reason: HOSPADM

## 2025-02-28 RX ADMIN — MORPHINE SULFATE 4 MG: 4 INJECTION, SOLUTION INTRAMUSCULAR; INTRAVENOUS at 21:51

## 2025-02-28 RX ADMIN — VANCOMYCIN HYDROCHLORIDE 1250 MG: 1.25 INJECTION, POWDER, LYOPHILIZED, FOR SOLUTION INTRAVENOUS at 21:57

## 2025-02-28 RX ADMIN — CEFEPIME 2000 MG: 2 INJECTION, POWDER, FOR SOLUTION INTRAVENOUS at 21:51

## 2025-02-28 RX ADMIN — IPRATROPIUM BROMIDE AND ALBUTEROL SULFATE 1 DOSE: 2.5; .5 SOLUTION RESPIRATORY (INHALATION) at 21:11

## 2025-02-28 RX ADMIN — SODIUM CHLORIDE 250 ML: 0.9 INJECTION, SOLUTION INTRAVENOUS at 21:50

## 2025-02-28 RX ADMIN — SODIUM CHLORIDE, PRESERVATIVE FREE 10 ML: 5 INJECTION INTRAVENOUS at 22:09

## 2025-02-28 ASSESSMENT — PAIN DESCRIPTION - LOCATION
LOCATION: BACK
LOCATION: GENERALIZED

## 2025-02-28 ASSESSMENT — PAIN - FUNCTIONAL ASSESSMENT
PAIN_FUNCTIONAL_ASSESSMENT: 0-10
PAIN_FUNCTIONAL_ASSESSMENT: 0-10

## 2025-02-28 ASSESSMENT — PAIN SCALES - GENERAL
PAINLEVEL_OUTOF10: 10

## 2025-02-28 ASSESSMENT — PAIN DESCRIPTION - FREQUENCY: FREQUENCY: CONTINUOUS

## 2025-02-28 ASSESSMENT — LIFESTYLE VARIABLES
HOW MANY STANDARD DRINKS CONTAINING ALCOHOL DO YOU HAVE ON A TYPICAL DAY: PATIENT DOES NOT DRINK
HOW OFTEN DO YOU HAVE A DRINK CONTAINING ALCOHOL: NEVER

## 2025-03-01 LAB
ANION GAP SERPL CALC-SCNC: 2 MMOL/L (ref 2–12)
APPEARANCE UR: ABNORMAL
APTT PPP: 25.5 SEC (ref 21.2–34.1)
BACTERIA URNS QL MICRO: ABNORMAL /HPF
BILIRUB UR QL: NEGATIVE
BUN SERPL-MCNC: 11 MG/DL (ref 6–20)
BUN/CREAT SERPL: 16 (ref 12–20)
CA-I BLD-MCNC: 8.7 MG/DL (ref 8.5–10.1)
CHLORIDE SERPL-SCNC: 107 MMOL/L (ref 97–108)
CO2 SERPL-SCNC: 27 MMOL/L (ref 21–32)
COLOR UR: ABNORMAL
CREAT SERPL-MCNC: 0.67 MG/DL (ref 0.7–1.3)
EKG ATRIAL RATE: 104 BPM
EKG DIAGNOSIS: NORMAL
EKG P AXIS: 72 DEGREES
EKG P-R INTERVAL: 92 MS
EKG Q-T INTERVAL: 344 MS
EKG QRS DURATION: 84 MS
EKG QTC CALCULATION (BAZETT): 452 MS
EKG R AXIS: 73 DEGREES
EKG T AXIS: 78 DEGREES
EKG VENTRICULAR RATE: 104 BPM
EPITH CASTS URNS QL MICRO: ABNORMAL /LPF
ERYTHROCYTE [DISTWIDTH] IN BLOOD BY AUTOMATED COUNT: 28 % (ref 11.5–14.5)
FERRITIN SERPL-MCNC: 14 NG/ML (ref 26–388)
FIBRINOGEN PPP-MCNC: 465 MG/DL (ref 220–535)
FOLATE SERPL-MCNC: 13.8 NG/ML (ref 5–21)
GLUCOSE SERPL-MCNC: 102 MG/DL (ref 65–100)
GLUCOSE UR STRIP.AUTO-MCNC: NEGATIVE MG/DL
HCT VFR BLD AUTO: 29.8 % (ref 36.6–50.3)
HCT VFR BLD AUTO: 31.2 % (ref 36.6–50.3)
HGB BLD-MCNC: 8.7 G/DL (ref 12.1–17)
HGB BLD-MCNC: 9.1 G/DL (ref 12.1–17)
HGB UR QL STRIP: ABNORMAL
INR PPP: 1 (ref 0.9–1.1)
IRON SATN MFR SERPL: 2 % (ref 20–50)
IRON SERPL-MCNC: 7 UG/DL (ref 35–150)
KETONES UR QL STRIP.AUTO: 20 MG/DL
LDH SERPL L TO P-CCNC: 248 U/L (ref 85–241)
LEUKOCYTE ESTERASE UR QL STRIP.AUTO: ABNORMAL
MAGNESIUM SERPL-MCNC: 2 MG/DL (ref 1.6–2.4)
MCH RBC QN AUTO: 21.2 PG (ref 26–34)
MCHC RBC AUTO-ENTMCNC: 29.2 G/DL (ref 30–36.5)
MCV RBC AUTO: 72.6 FL (ref 80–99)
MUCOUS THREADS URNS QL MICRO: ABNORMAL /LPF
NITRITE UR QL STRIP.AUTO: POSITIVE
NRBC # BLD: 0.15 K/UL (ref 0–0.01)
NRBC BLD-RTO: 1.2 PER 100 WBC
PERIPHERAL SMEAR, MD REVIEW: NORMAL
PERIPHERAL SMEAR, MD REVIEW: NORMAL
PH UR STRIP: 5 (ref 5–8)
PHOSPHATE SERPL-MCNC: 1.9 MG/DL (ref 2.6–4.7)
PLATELET # BLD AUTO: 243 K/UL (ref 150–400)
PMV BLD AUTO: 10.3 FL (ref 8.9–12.9)
POTASSIUM SERPL-SCNC: 3.5 MMOL/L (ref 3.5–5.1)
PROT UR STRIP-MCNC: 30 MG/DL
PROTHROMBIN TIME: 13.7 SEC (ref 11.9–14.6)
RBC # BLD AUTO: 4.3 M/UL (ref 4.1–5.7)
RBC #/AREA URNS HPF: ABNORMAL /HPF (ref 0–5)
RETICS # AUTO: 0.03 M/UL (ref 0.03–0.1)
RETICS/RBC NFR AUTO: 0.9 % (ref 0.7–2.1)
SODIUM SERPL-SCNC: 136 MMOL/L (ref 136–145)
SP GR UR REFRACTOMETRY: 1.02 (ref 1–1.03)
THERAPEUTIC RANGE: NORMAL SEC (ref 82–109)
TIBC SERPL-MCNC: 438 UG/DL (ref 250–450)
URINE CULTURE IF INDICATED: ABNORMAL
UROBILINOGEN UR QL STRIP.AUTO: 0.1 EU/DL (ref 0.1–1)
VIT B12 SERPL-MCNC: 473 PG/ML (ref 193–986)
WBC # BLD AUTO: 13 K/UL (ref 4.1–11.1)
WBC URNS QL MICRO: ABNORMAL /HPF (ref 0–4)

## 2025-03-01 PROCEDURE — 85014 HEMATOCRIT: CPT

## 2025-03-01 PROCEDURE — 6360000002 HC RX W HCPCS: Performed by: NURSE PRACTITIONER

## 2025-03-01 PROCEDURE — 94761 N-INVAS EAR/PLS OXIMETRY MLT: CPT

## 2025-03-01 PROCEDURE — 85384 FIBRINOGEN ACTIVITY: CPT

## 2025-03-01 PROCEDURE — 6370000000 HC RX 637 (ALT 250 FOR IP): Performed by: INTERNAL MEDICINE

## 2025-03-01 PROCEDURE — 6370000000 HC RX 637 (ALT 250 FOR IP): Performed by: STUDENT IN AN ORGANIZED HEALTH CARE EDUCATION/TRAINING PROGRAM

## 2025-03-01 PROCEDURE — 2700000000 HC OXYGEN THERAPY PER DAY

## 2025-03-01 PROCEDURE — 84100 ASSAY OF PHOSPHORUS: CPT

## 2025-03-01 PROCEDURE — 85610 PROTHROMBIN TIME: CPT

## 2025-03-01 PROCEDURE — 2000000000 HC ICU R&B

## 2025-03-01 PROCEDURE — 6360000002 HC RX W HCPCS: Performed by: INTERNAL MEDICINE

## 2025-03-01 PROCEDURE — 80048 BASIC METABOLIC PNL TOTAL CA: CPT

## 2025-03-01 PROCEDURE — 2500000003 HC RX 250 WO HCPCS: Performed by: INTERNAL MEDICINE

## 2025-03-01 PROCEDURE — 83735 ASSAY OF MAGNESIUM: CPT

## 2025-03-01 PROCEDURE — 36430 TRANSFUSION BLD/BLD COMPNT: CPT

## 2025-03-01 PROCEDURE — 85018 HEMOGLOBIN: CPT

## 2025-03-01 PROCEDURE — 2580000003 HC RX 258: Performed by: NURSE PRACTITIONER

## 2025-03-01 PROCEDURE — 2500000003 HC RX 250 WO HCPCS: Performed by: NURSE PRACTITIONER

## 2025-03-01 PROCEDURE — 6370000000 HC RX 637 (ALT 250 FOR IP)

## 2025-03-01 PROCEDURE — 81001 URINALYSIS AUTO W/SCOPE: CPT

## 2025-03-01 PROCEDURE — 6370000000 HC RX 637 (ALT 250 FOR IP): Performed by: NURSE PRACTITIONER

## 2025-03-01 PROCEDURE — 94640 AIRWAY INHALATION TREATMENT: CPT

## 2025-03-01 PROCEDURE — 85730 THROMBOPLASTIN TIME PARTIAL: CPT

## 2025-03-01 PROCEDURE — P9016 RBC LEUKOCYTES REDUCED: HCPCS

## 2025-03-01 PROCEDURE — 85027 COMPLETE CBC AUTOMATED: CPT

## 2025-03-01 PROCEDURE — 83615 LACTATE (LD) (LDH) ENZYME: CPT

## 2025-03-01 RX ORDER — MORPHINE SULFATE 4 MG/ML
4 INJECTION, SOLUTION INTRAMUSCULAR; INTRAVENOUS EVERY 4 HOURS PRN
Status: DISPENSED | OUTPATIENT
Start: 2025-03-01 | End: 2025-03-02

## 2025-03-01 RX ORDER — LORAZEPAM 0.5 MG/1
0.5 TABLET ORAL ONCE
Status: COMPLETED | OUTPATIENT
Start: 2025-03-01 | End: 2025-03-01

## 2025-03-01 RX ORDER — IPRATROPIUM BROMIDE AND ALBUTEROL SULFATE 2.5; .5 MG/3ML; MG/3ML
1 SOLUTION RESPIRATORY (INHALATION)
Status: DISCONTINUED | OUTPATIENT
Start: 2025-03-01 | End: 2025-03-01

## 2025-03-01 RX ORDER — OSELTAMIVIR PHOSPHATE 75 MG/1
75 CAPSULE ORAL 2 TIMES DAILY
Status: DISCONTINUED | OUTPATIENT
Start: 2025-03-01 | End: 2025-03-03 | Stop reason: HOSPADM

## 2025-03-01 RX ORDER — ALBUTEROL SULFATE 2.5 MG/.5ML
2.5 SOLUTION RESPIRATORY (INHALATION) EVERY 6 HOURS PRN
Status: DISCONTINUED | OUTPATIENT
Start: 2025-03-01 | End: 2025-03-03 | Stop reason: HOSPADM

## 2025-03-01 RX ORDER — IPRATROPIUM BROMIDE AND ALBUTEROL SULFATE 2.5; .5 MG/3ML; MG/3ML
1 SOLUTION RESPIRATORY (INHALATION)
Status: DISCONTINUED | OUTPATIENT
Start: 2025-03-01 | End: 2025-03-02

## 2025-03-01 RX ADMIN — OSELTAMIVIR PHOSPHATE 75 MG: 75 CAPSULE ORAL at 21:31

## 2025-03-01 RX ADMIN — LORAZEPAM 0.5 MG: 0.5 TABLET ORAL at 13:04

## 2025-03-01 RX ADMIN — MORPHINE SULFATE 4 MG: 4 INJECTION, SOLUTION INTRAMUSCULAR; INTRAVENOUS at 13:52

## 2025-03-01 RX ADMIN — SODIUM CHLORIDE, PRESERVATIVE FREE 40 MG: 5 INJECTION INTRAVENOUS at 02:34

## 2025-03-01 RX ADMIN — MORPHINE SULFATE 4 MG: 4 INJECTION, SOLUTION INTRAMUSCULAR; INTRAVENOUS at 22:52

## 2025-03-01 RX ADMIN — MORPHINE SULFATE 4 MG: 4 INJECTION, SOLUTION INTRAMUSCULAR; INTRAVENOUS at 18:43

## 2025-03-01 RX ADMIN — IPRATROPIUM BROMIDE AND ALBUTEROL SULFATE 1 DOSE: 2.5; .5 SOLUTION RESPIRATORY (INHALATION) at 13:13

## 2025-03-01 RX ADMIN — SODIUM CHLORIDE, PRESERVATIVE FREE 10 ML: 5 INJECTION INTRAVENOUS at 22:59

## 2025-03-01 RX ADMIN — OSELTAMIVIR PHOSPHATE 75 MG: 75 CAPSULE ORAL at 00:51

## 2025-03-01 RX ADMIN — POTASSIUM & SODIUM PHOSPHATES POWDER PACK 280-160-250 MG 250 MG: 280-160-250 PACK at 08:59

## 2025-03-01 RX ADMIN — MORPHINE SULFATE 4 MG: 4 INJECTION, SOLUTION INTRAMUSCULAR; INTRAVENOUS at 09:02

## 2025-03-01 RX ADMIN — CEFTRIAXONE SODIUM 2000 MG: 2 INJECTION, POWDER, FOR SOLUTION INTRAMUSCULAR; INTRAVENOUS at 10:44

## 2025-03-01 RX ADMIN — OSELTAMIVIR PHOSPHATE 75 MG: 75 CAPSULE ORAL at 08:59

## 2025-03-01 RX ADMIN — MORPHINE SULFATE 4 MG: 4 INJECTION, SOLUTION INTRAMUSCULAR; INTRAVENOUS at 04:10

## 2025-03-01 RX ADMIN — LORAZEPAM 0.5 MG: 0.5 TABLET ORAL at 23:36

## 2025-03-01 RX ADMIN — IPRATROPIUM BROMIDE AND ALBUTEROL SULFATE 1 DOSE: 2.5; .5 SOLUTION RESPIRATORY (INHALATION) at 21:56

## 2025-03-01 RX ADMIN — ACETAMINOPHEN 650 MG: 325 TABLET ORAL at 00:51

## 2025-03-01 ASSESSMENT — PAIN SCALES - GENERAL
PAINLEVEL_OUTOF10: 1
PAINLEVEL_OUTOF10: 1
PAINLEVEL_OUTOF10: 4
PAINLEVEL_OUTOF10: 9
PAINLEVEL_OUTOF10: 2
PAINLEVEL_OUTOF10: 9
PAINLEVEL_OUTOF10: 0
PAINLEVEL_OUTOF10: 0
PAINLEVEL_OUTOF10: 1
PAINLEVEL_OUTOF10: 8

## 2025-03-01 ASSESSMENT — PAIN DESCRIPTION - LOCATION
LOCATION: GENERALIZED
LOCATION: GENERALIZED
LOCATION: OTHER (COMMENT)

## 2025-03-01 ASSESSMENT — PAIN SCALES - WONG BAKER: WONGBAKER_NUMERICALRESPONSE: HURTS A LITTLE BIT

## 2025-03-01 ASSESSMENT — PAIN DESCRIPTION - DESCRIPTORS: DESCRIPTORS: ACHING;CRUSHING;CRAMPING

## 2025-03-01 NOTE — PLAN OF CARE
Problem: Discharge Planning  Goal: Discharge to home or other facility with appropriate resources  Outcome: Progressing     Problem: Pain  Goal: Verbalizes/displays adequate comfort level or baseline comfort level  Outcome: Progressing     Problem: Infection - Adult  Goal: Absence of infection at discharge  Outcome: Progressing     Problem: Hematologic - Adult  Goal: Maintains hematologic stability  Outcome: Progressing     Problem: Skin/Tissue Integrity  Goal: Skin integrity remains intact  Description: 1.  Monitor for areas of redness and/or skin breakdown  2.  Assess vascular access sites hourly  3.  Every 4-6 hours minimum:  Change oxygen saturation probe site  4.  Every 4-6 hours:  If on nasal continuous positive airway pressure, respiratory therapy assess nares and determine need for appliance change or resting period  Outcome: Progressing     Problem: Safety - Adult  Goal: Free from fall injury  Outcome: Progressing

## 2025-03-01 NOTE — ED TRIAGE NOTES
Patient BIBA for c/o shortness of breath and fatigue says that he thinks that he needs blood transfusion, hx anemia.   States that he did not come earlier because he had been having flu like sx for 3 days generalized body aches, nausea, vomiting, chest congestion, productive cough.    Paraplegic from waist down  Alert and oriented and ambulates without assistance of cane, wheelchair, or walker

## 2025-03-01 NOTE — ACP (ADVANCE CARE PLANNING)
Advance Care Planning     Advance Care Planning Inpatient Note  Spiritual Care Department    Today's Date: 3/1/2025  Unit: [facility] 2 [facility] ICU    Received request from admission screening.  Upon review of chart and communication with care team, Spiritual Care will defer advance care planning with patient at this time.. Patient was/were present in the room during visit.    Goals of ACP Conversation:  Discuss advance care planning documents    Health Care Decision Makers:       Primary Decision Maker: Navid Cuello JrTami - Child - [phone]  Summary:  No Decision Maker named by patient at this time    Advance Care Planning Documents (Patient Wishes):  None     Assessment:    is responding to the consult for spiritual services and the completion of Advance Medical Directive.  confers with patient's nurse who affirms that patient was in the right frame of mind to make own decisions.  proceeds to patient's room.  meets patient sitting on the bed, leaning on his left side.  observes patient looking uneasy and confused.  introduces self and role to patient and tries to make him relax. Patient declines  interventions, notes he is having an anxiety attack and wants to be left alone.  honors his request and informs him of availability at his convenience.  returns to notify his nurse about patient's current state and discuss possible plan of care.    Interventions:  Patient DECLINED ACP conversation    Care Preferences Communicated:   No    Outcomes/Plan:  ACP Discussion: Refused    Electronically signed by KELLEN MARTINEZ, Chaplain Resident on 3/1/2025 at 1:01 PM

## 2025-03-01 NOTE — H&P
assessment to treat or prevent imminent deterioration.    I personally spent 35 minutes of critical care time.  This is time spent at this critically ill patient's bedside actively involved in patient care as well as the coordination of care.  This does not include any procedural time which has been billed separately.    Elvie Kumari, FIDEL - [facility]  2/28/2025

## 2025-03-01 NOTE — ED PROVIDER NOTES
CO2 28 21 - 32 mmol/L    Anion Gap 6 2 - 12 mmol/L    Glucose 115 (H) 65 - 100 mg/dL    BUN 13 6 - 20 mg/dL    Creatinine 0.76 0.70 - 1.30 mg/dL    BUN/Creatinine Ratio 17 12 - 20      Est, Glom Filt Rate >90 >60 ml/min/1.73m2    Calcium 8.6 8.5 - 10.1 mg/dL    Total Bilirubin 0.8 0.2 - 1.0 mg/dL    AST 19 15 - 37 U/L    ALT 13 12 - 78 U/L    Alk Phosphatase 89 45 - 117 U/L    Total Protein 7.2 6.4 - 8.2 g/dL    Albumin 3.7 3.5 - 5.0 g/dL    Globulin 3.5 2.0 - 4.0 g/dL    Albumin/Globulin Ratio 1.1 1.1 - 2.2     Magnesium    Collection Time: 02/28/25  8:30 PM   Result Value Ref Range    Magnesium 1.8 1.6 - 2.4 mg/dL   Troponin    Collection Time: 02/28/25  8:30 PM   Result Value Ref Range    Troponin, High Sensitivity 14 0 - 76 ng/L   Brain Natriuretic Peptide    Collection Time: 02/28/25  8:30 PM   Result Value Ref Range    NT Pro- (H) <125 pg/mL   Procalcitonin    Collection Time: 02/28/25  8:30 PM   Result Value Ref Range    Procalcitonin 0.72 (H) 0 ng/mL   TYPE AND SCREEN    Collection Time: 02/28/25  8:30 PM   Result Value Ref Range    Crossmatch expiration date 03/03/2025,9351     ABO/Rh A Positive     Antibody Screen Negative     Unit Number [id number]     Product Code Blood Bank RC LR     Unit Divison 00     Dispense Status Blood Bank Issued     Unit Issue Date/Time [id number]     Product Code Blood Bank [id number]     Blood Bank Unit Type and Rh A POS     Blood Bank ISBT Product Blood Type 6200     Blood Bank Blood Product Expiration Date [id number]     Transfusion Status Ok to transfuse     Crossmatch Result Compatible    POC Blood Gas and Chemistry    Collection Time: 02/28/25  9:54 PM   Result Value Ref Range    POC pH 7.49 (H) 7.35 - 7.45      POC pCO2 31.9 (L) 35.0 - 45.0 mmHg    POC PO2 <27 (LL) 75 - 100 mmHg    POC Sodium 134 (L) 136 - 145 mmol/L    POC Potassium 3.2 (L) 3.5 - 5.5 mmol/L    POC Ionized Calcium 1.12 1.12 - 1.32 mmol/L    POC Glucose 131 (H) 65 - 100 mg/dL    POC Chloride

## 2025-03-01 NOTE — PLAN OF CARE
Patient has transfer orders to med tele, pt c/o of lots of anxiety and feels like he can't catch his breath, pt getting lorazepam, will continue to monitor

## 2025-03-01 NOTE — CONSULTS
Gastroenterology Consult Note        Patient: Navid Cuello MRN: [id number]  SSN: [id number]    YOB: 1960  Age: 64 y.o.  Sex: male      Subjective:      Navid Cuello is a 64 y.o. male who is being seen for anemia obscure GI blood.  Patient seen the hospital, multiple history from medical records,  A gentleman with past medical history of anemia, history of GI bleeding presented hospital with shortness of breath, patient had nausea no vomiting no hematemesis, symptoms going on for days, while emergency room patient was found to have a hemoglobin less than 5, for which he received blood transfusion, patient also had influenza A meds ICU due to the hypoxia, currently patient on breathing treatment, no melena, no hematochezia no abdominal pain, feel better after blood transfusion  No past medical history on file.  Past Surgical History:   Procedure Laterality Date    COLONOSCOPY N/A 2/28/2023    COLONOSCOPY performed by Madelyn Oden MD at [facility] ENDOSCOPY    COLONOSCOPY N/A 3/1/2023    COLONOSCOPY performed by Madelyn Oden MD at [facility] ENDOSCOPY      No family history on file.  Social History     Tobacco Use    Smoking status: Not on file    Smokeless tobacco: Not on file   Substance Use Topics    Alcohol use: Not on file      Current Facility-Administered Medications   Medication Dose Route Frequency Provider Last Rate Last Admin    oseltamivir (TAMIFLU) capsule 75 mg  75 mg Oral BID Elvie Kumari APRN - CNP   75 mg at 03/01/25 0859    albuterol (PROVENTIL) nebulizer solution 2.5 mg  2.5 mg Nebulization Q6H PRN Elvie Kumari APRN - CNP        pantoprazole (PROTONIX) 40 mg in sodium chloride (PF) 0.9 % 10 mL injection  40 mg IntraVENous Q12H Elvie Kumari APRN - CNP   40 mg at 03/01/25 0234    morphine (PF) injection 4 mg  4 mg IntraVENous Q4H PRN Elvie Kumari APRN - CNP   4 mg at 03/01/25 0902    cefTRIAXone (ROCEPHIN) 2,000 mg in sterile water 20 mL IV 
Gravity, UA 1.018 1.003 - 1.030      pH, Urine 5.0 5.0 - 8.0      Protein, UA 30 (A) Negative mg/dL    Glucose, Ur Negative Negative mg/dL    Ketones, Urine 20 (A) Negative mg/dL    Bilirubin, Urine Negative Negative      Blood, Urine Small (A) Negative      Urobilinogen, Urine 0.1 0.1 - 1.0 EU/dL    Nitrite, Urine Positive (A) Negative      Leukocyte Esterase, Urine Trace (A) Negative      WBC, UA 5-10 0 - 4 /hpf    RBC, UA 0-5 0 - 5 /hpf    Epithelial Cells, UA Moderate (A) Few /lpf    BACTERIA, URINE 4+ (A) Negative /hpf    Urine Culture if Indicated Culture not indicated by UA result Culture not indicated by UA result      Mucus, UA Trace (A) Negative /lpf   Hemoglobin and Hematocrit    Collection Time: 03/01/25  6:20 AM   Result Value Ref Range    Hemoglobin 8.7 (L) 12.1 - 17.0 g/dL    Hematocrit 29.8 (L) 36.6 - 50.3 %   Basic Metabolic Panel    Collection Time: 03/01/25  7:30 AM   Result Value Ref Range    Sodium 136 136 - 145 mmol/L    Potassium 3.5 3.5 - 5.1 mmol/L    Chloride 107 97 - 108 mmol/L    CO2 27 21 - 32 mmol/L    Anion Gap 2 2 - 12 mmol/L    Glucose 102 (H) 65 - 100 mg/dL    BUN 11 6 - 20 mg/dL    Creatinine 0.67 (L) 0.70 - 1.30 mg/dL    BUN/Creatinine Ratio 16 12 - 20      Est, Glom Filt Rate >90 >60 ml/min/1.73m2    Calcium 8.7 8.5 - 10.1 mg/dL   Magnesium    Collection Time: 03/01/25  7:30 AM   Result Value Ref Range    Magnesium 2.0 1.6 - 2.4 mg/dL   Phosphorus    Collection Time: 03/01/25  7:30 AM   Result Value Ref Range    Phosphorus 1.9 (L) 2.6 - 4.7 mg/dL         Imaging:   XR CHEST PORTABLE   Final Result   No acute cardiopulmonary process.         Electronically signed by Casa Guadarrama             Discussion/Medical Decision Making: Patient with numerous medical comorbidities, each with increased risk for mortality and morbidity if left untreated.   I have reviewed patient's presenting subjective and objective findings, as well as all laboratory studies, imaging studies, and vital

## 2025-03-02 LAB
ABO + RH BLD: NORMAL
ANION GAP SERPL CALC-SCNC: 6 MMOL/L (ref 2–12)
BLD PROD TYP BPU: NORMAL
BLOOD BANK BLOOD PRODUCT EXPIRATION DATE: NORMAL
BLOOD BANK DISPENSE STATUS: NORMAL
BLOOD BANK ISBT PRODUCT BLOOD TYPE: 6200
BLOOD BANK PRODUCT CODE: NORMAL
BLOOD BANK UNIT TYPE AND RH: NORMAL
BLOOD GROUP ANTIBODIES SERPL: NEGATIVE
BPU ID: NORMAL
BUN SERPL-MCNC: 6 MG/DL (ref 6–20)
BUN/CREAT SERPL: 8 (ref 12–20)
CA-I BLD-MCNC: 8.5 MG/DL (ref 8.5–10.1)
CHLORIDE SERPL-SCNC: 103 MMOL/L (ref 97–108)
CO2 SERPL-SCNC: 27 MMOL/L (ref 21–32)
CREAT SERPL-MCNC: 0.75 MG/DL (ref 0.7–1.3)
CROSSMATCH RESULT: NORMAL
ERYTHROCYTE [DISTWIDTH] IN BLOOD BY AUTOMATED COUNT: 28.1 % (ref 11.5–14.5)
GLUCOSE SERPL-MCNC: 116 MG/DL (ref 65–100)
HCT VFR BLD AUTO: 30.5 % (ref 36.6–50.3)
HGB BLD-MCNC: 8.8 G/DL (ref 12.1–17)
MAGNESIUM SERPL-MCNC: 1.9 MG/DL (ref 1.6–2.4)
MCH RBC QN AUTO: 21 PG (ref 26–34)
MCHC RBC AUTO-ENTMCNC: 28.9 G/DL (ref 30–36.5)
MCV RBC AUTO: 72.6 FL (ref 80–99)
NRBC # BLD: 0.13 K/UL (ref 0–0.01)
NRBC BLD-RTO: 1.3 PER 100 WBC
PHOSPHATE SERPL-MCNC: 1.8 MG/DL (ref 2.6–4.7)
PLATELET # BLD AUTO: 230 K/UL (ref 150–400)
PMV BLD AUTO: 10.1 FL (ref 8.9–12.9)
POTASSIUM SERPL-SCNC: 3.2 MMOL/L (ref 3.5–5.1)
RBC # BLD AUTO: 4.2 M/UL (ref 4.1–5.7)
SODIUM SERPL-SCNC: 136 MMOL/L (ref 136–145)
SPECIMEN EXP DATE BLD: NORMAL
TRANSFUSION STATUS PATIENT QL: NORMAL
UNIT DIVISION: 0
UNIT ISSUE DATE/TIME: NORMAL
WBC # BLD AUTO: 10.3 K/UL (ref 4.1–11.1)

## 2025-03-02 PROCEDURE — 6360000002 HC RX W HCPCS: Performed by: NURSE PRACTITIONER

## 2025-03-02 PROCEDURE — 94761 N-INVAS EAR/PLS OXIMETRY MLT: CPT

## 2025-03-02 PROCEDURE — 6360000002 HC RX W HCPCS: Performed by: INTERNAL MEDICINE

## 2025-03-02 PROCEDURE — 83010 ASSAY OF HAPTOGLOBIN QUANT: CPT

## 2025-03-02 PROCEDURE — 2500000003 HC RX 250 WO HCPCS: Performed by: INTERNAL MEDICINE

## 2025-03-02 PROCEDURE — 6370000000 HC RX 637 (ALT 250 FOR IP): Performed by: STUDENT IN AN ORGANIZED HEALTH CARE EDUCATION/TRAINING PROGRAM

## 2025-03-02 PROCEDURE — 2500000003 HC RX 250 WO HCPCS: Performed by: NURSE PRACTITIONER

## 2025-03-02 PROCEDURE — 94640 AIRWAY INHALATION TREATMENT: CPT

## 2025-03-02 PROCEDURE — 6370000000 HC RX 637 (ALT 250 FOR IP): Performed by: NURSE PRACTITIONER

## 2025-03-02 PROCEDURE — 85027 COMPLETE CBC AUTOMATED: CPT

## 2025-03-02 PROCEDURE — 80048 BASIC METABOLIC PNL TOTAL CA: CPT

## 2025-03-02 PROCEDURE — 83735 ASSAY OF MAGNESIUM: CPT

## 2025-03-02 PROCEDURE — 36415 COLL VENOUS BLD VENIPUNCTURE: CPT

## 2025-03-02 PROCEDURE — 84100 ASSAY OF PHOSPHORUS: CPT

## 2025-03-02 PROCEDURE — 1100000000 HC RM PRIVATE

## 2025-03-02 RX ORDER — HYDROXYZINE PAMOATE 25 MG/1
25 CAPSULE ORAL 3 TIMES DAILY PRN
Status: DISCONTINUED | OUTPATIENT
Start: 2025-03-02 | End: 2025-03-03

## 2025-03-02 RX ORDER — CYCLOBENZAPRINE HCL 10 MG
10 TABLET ORAL 3 TIMES DAILY PRN
Status: DISCONTINUED | OUTPATIENT
Start: 2025-03-02 | End: 2025-03-03 | Stop reason: HOSPADM

## 2025-03-02 RX ORDER — IPRATROPIUM BROMIDE AND ALBUTEROL SULFATE 2.5; .5 MG/3ML; MG/3ML
1 SOLUTION RESPIRATORY (INHALATION)
Status: DISCONTINUED | OUTPATIENT
Start: 2025-03-02 | End: 2025-03-02

## 2025-03-02 RX ORDER — IPRATROPIUM BROMIDE AND ALBUTEROL SULFATE 2.5; .5 MG/3ML; MG/3ML
1 SOLUTION RESPIRATORY (INHALATION)
Status: DISCONTINUED | OUTPATIENT
Start: 2025-03-02 | End: 2025-03-03 | Stop reason: HOSPADM

## 2025-03-02 RX ADMIN — OSELTAMIVIR PHOSPHATE 75 MG: 75 CAPSULE ORAL at 20:32

## 2025-03-02 RX ADMIN — IPRATROPIUM BROMIDE AND ALBUTEROL SULFATE 1 DOSE: 2.5; .5 SOLUTION RESPIRATORY (INHALATION) at 20:56

## 2025-03-02 RX ADMIN — HYDROXYZINE PAMOATE 25 MG: 25 CAPSULE ORAL at 20:32

## 2025-03-02 RX ADMIN — HYDROXYZINE PAMOATE 25 MG: 25 CAPSULE ORAL at 16:58

## 2025-03-02 RX ADMIN — ACETAMINOPHEN 650 MG: 325 TABLET ORAL at 16:59

## 2025-03-02 RX ADMIN — SODIUM CHLORIDE, PRESERVATIVE FREE 10 ML: 5 INJECTION INTRAVENOUS at 20:34

## 2025-03-02 RX ADMIN — HYDROXYZINE PAMOATE 25 MG: 25 CAPSULE ORAL at 05:13

## 2025-03-02 RX ADMIN — ALBUTEROL SULFATE 2.5 MG: 2.5 SOLUTION RESPIRATORY (INHALATION) at 17:44

## 2025-03-02 RX ADMIN — POTASSIUM BICARBONATE 40 MEQ: 782 TABLET, EFFERVESCENT ORAL at 08:16

## 2025-03-02 RX ADMIN — MORPHINE SULFATE 4 MG: 4 INJECTION, SOLUTION INTRAMUSCULAR; INTRAVENOUS at 03:28

## 2025-03-02 RX ADMIN — IPRATROPIUM BROMIDE AND ALBUTEROL SULFATE 1 DOSE: 2.5; .5 SOLUTION RESPIRATORY (INHALATION) at 09:06

## 2025-03-02 RX ADMIN — CYCLOBENZAPRINE 10 MG: 10 TABLET, FILM COATED ORAL at 20:32

## 2025-03-02 RX ADMIN — CEFTRIAXONE SODIUM 2000 MG: 2 INJECTION, POWDER, FOR SOLUTION INTRAMUSCULAR; INTRAVENOUS at 09:48

## 2025-03-02 RX ADMIN — ONDANSETRON 4 MG: 2 INJECTION, SOLUTION INTRAMUSCULAR; INTRAVENOUS at 05:13

## 2025-03-02 RX ADMIN — OSELTAMIVIR PHOSPHATE 75 MG: 75 CAPSULE ORAL at 08:14

## 2025-03-02 RX ADMIN — SODIUM CHLORIDE, PRESERVATIVE FREE 10 ML: 5 INJECTION INTRAVENOUS at 08:14

## 2025-03-02 ASSESSMENT — PAIN SCALES - GENERAL
PAINLEVEL_OUTOF10: 0
PAINLEVEL_OUTOF10: 10

## 2025-03-02 ASSESSMENT — PAIN SCALES - WONG BAKER
WONGBAKER_NUMERICALRESPONSE: NO HURT

## 2025-03-02 ASSESSMENT — PAIN DESCRIPTION - ORIENTATION
ORIENTATION: RIGHT;LEFT;MID
ORIENTATION: RIGHT;LEFT

## 2025-03-02 ASSESSMENT — PAIN DESCRIPTION - LOCATION
LOCATION: PELVIS;SACRUM
LOCATION: BACK;ABDOMEN;SHOULDER

## 2025-03-02 ASSESSMENT — PAIN - FUNCTIONAL ASSESSMENT
PAIN_FUNCTIONAL_ASSESSMENT: PREVENTS OR INTERFERES SOME ACTIVE ACTIVITIES AND ADLS
PAIN_FUNCTIONAL_ASSESSMENT: PREVENTS OR INTERFERES SOME ACTIVE ACTIVITIES AND ADLS

## 2025-03-02 ASSESSMENT — PAIN DESCRIPTION - DESCRIPTORS
DESCRIPTORS: ACHING
DESCRIPTORS: ACHING

## 2025-03-02 NOTE — PLAN OF CARE
Problem: Discharge Planning  Goal: Discharge to home or other facility with appropriate resources  3/2/2025 0150 by Domo Jose, RN  Outcome: Progressing  3/1/2025 1327 by Shanda Wiley, RN  Outcome: Progressing  Flowsheets (Taken 3/1/2025 0800)  Discharge to home or other facility with appropriate resources:   Identify barriers to discharge with patient and caregiver   Arrange for needed discharge resources and transportation as appropriate   Identify discharge learning needs (meds, wound care, etc)   Arrange for interpreters to assist at discharge as needed   Refer to discharge planning if patient needs post-hospital services based on physician order or complex needs related to functional status, cognitive ability or social support system     Problem: Pain  Goal: Verbalizes/displays adequate comfort level or baseline comfort level  Outcome: Progressing     Problem: Infection - Adult  Goal: Absence of infection at discharge  Outcome: Progressing     Problem: Hematologic - Adult  Goal: Maintains hematologic stability  Outcome: Progressing     Problem: Skin/Tissue Integrity  Goal: Skin integrity remains intact  Description: 1.  Monitor for areas of redness and/or skin breakdown  2.  Assess vascular access sites hourly  3.  Every 4-6 hours minimum:  Change oxygen saturation probe site  4.  Every 4-6 hours:  If on nasal continuous positive airway pressure, respiratory therapy assess nares and determine need for appliance change or resting period  Outcome: Progressing     Problem: Safety - Adult  Goal: Free from fall injury  Outcome: Progressing

## 2025-03-02 NOTE — CARE COORDINATION
03/02/25 1752   Service Assessment   Patient Orientation Alert and Oriented   Cognition Alert   History Provided By Medical Record   Primary Caregiver Self   Support Systems Children   Patient's Healthcare Decision Maker is: Patient Declined (Legal Next of Kin Remains as Decision Maker)   PCP Verified by CM No   Prior Functional Level Independent in ADLs/IADLs   Current Functional Level Independent in ADLs/IADLs   Can patient return to prior living arrangement Yes   Ability to make needs known: Unable  (Did not want to speak to CM)   Family able to assist with home care needs: Other (comment)  (Not sure.)   Would you like for me to discuss the discharge plan with any other family members/significant others, and if so, who? No   Financial Resources Other (Comment)  (self pay)   Community Resources None   CM/SW Referral Other (see comment)  (Patient has a psyc consult on 2/3/25)   Social/Functional History   Type of Home House   Prior Level of Assist for ADLs Independent   Prior Level of Assist for Homemaking Independent   Ambulation Assistance Independent   Prior Level of Assist for Transfers Independent   Discharge Planning   Type of Residence House   Living Arrangements Alone   [facility]   Potential Assistance Needed Outpatient PT/OT   DME Ordered? No   Potential Assistance Purchasing Medications No   Type of Home Care Services PT   Patient expects to be discharged to: House   Services At/After Discharge   Transition of Care Consult (CM Consult) Home Health   [place] Resource Information Provided? No   Confirm Follow Up Transport Other (see comment)  (Patient would not speak to CM)     Patient refused assessment.  CM consulted with nurse for assessment.  Patient has bars on his legs.  Nurse put in a psyc consult for 3/2/25 also states patient is non-compliant.    Advance Care Planning     General Advance Care Planning (ACP) Conversation    Date of Conversation: 3/2/2025  Conducted

## 2025-03-03 VITALS
HEART RATE: 64 BPM | TEMPERATURE: 98.1 F | SYSTOLIC BLOOD PRESSURE: 133 MMHG | DIASTOLIC BLOOD PRESSURE: 79 MMHG | HEIGHT: 68 IN | WEIGHT: 121.1 LBS | OXYGEN SATURATION: 98 % | BODY MASS INDEX: 18.35 KG/M2 | RESPIRATION RATE: 18 BRPM

## 2025-03-03 LAB
ANION GAP SERPL CALC-SCNC: 4 MMOL/L (ref 2–12)
BUN SERPL-MCNC: 8 MG/DL (ref 6–20)
BUN/CREAT SERPL: 12 (ref 12–20)
CA-I BLD-MCNC: 9 MG/DL (ref 8.5–10.1)
CHLORIDE SERPL-SCNC: 108 MMOL/L (ref 97–108)
CO2 SERPL-SCNC: 28 MMOL/L (ref 21–32)
CREAT SERPL-MCNC: 0.66 MG/DL (ref 0.7–1.3)
ERYTHROCYTE [DISTWIDTH] IN BLOOD BY AUTOMATED COUNT: 29.3 % (ref 11.5–14.5)
GLUCOSE SERPL-MCNC: 159 MG/DL (ref 65–100)
HAPTOGLOB SERPL-MCNC: 181 MG/DL (ref 30–200)
HCT VFR BLD AUTO: 32.4 % (ref 36.6–50.3)
HGB BLD-MCNC: 9.2 G/DL (ref 12.1–17)
MAGNESIUM SERPL-MCNC: 2.1 MG/DL (ref 1.6–2.4)
MCH RBC QN AUTO: 21 PG (ref 26–34)
MCHC RBC AUTO-ENTMCNC: 28.4 G/DL (ref 30–36.5)
MCV RBC AUTO: 74 FL (ref 80–99)
NRBC # BLD: 0.05 K/UL (ref 0–0.01)
NRBC BLD-RTO: 0.9 PER 100 WBC
PHOSPHATE SERPL-MCNC: 2 MG/DL (ref 2.6–4.7)
PLATELET # BLD AUTO: 247 K/UL (ref 150–400)
PMV BLD AUTO: 10 FL (ref 8.9–12.9)
POTASSIUM SERPL-SCNC: 3.1 MMOL/L (ref 3.5–5.1)
RBC # BLD AUTO: 4.38 M/UL (ref 4.1–5.7)
SODIUM SERPL-SCNC: 140 MMOL/L (ref 136–145)
WBC # BLD AUTO: 5.9 K/UL (ref 4.1–11.1)

## 2025-03-03 PROCEDURE — 6360000002 HC RX W HCPCS: Performed by: INTERNAL MEDICINE

## 2025-03-03 PROCEDURE — 83735 ASSAY OF MAGNESIUM: CPT

## 2025-03-03 PROCEDURE — 6370000000 HC RX 637 (ALT 250 FOR IP): Performed by: STUDENT IN AN ORGANIZED HEALTH CARE EDUCATION/TRAINING PROGRAM

## 2025-03-03 PROCEDURE — 84100 ASSAY OF PHOSPHORUS: CPT

## 2025-03-03 PROCEDURE — 36415 COLL VENOUS BLD VENIPUNCTURE: CPT

## 2025-03-03 PROCEDURE — 94761 N-INVAS EAR/PLS OXIMETRY MLT: CPT

## 2025-03-03 PROCEDURE — 6370000000 HC RX 637 (ALT 250 FOR IP): Performed by: NURSE PRACTITIONER

## 2025-03-03 PROCEDURE — 2500000003 HC RX 250 WO HCPCS: Performed by: NURSE PRACTITIONER

## 2025-03-03 PROCEDURE — 85027 COMPLETE CBC AUTOMATED: CPT

## 2025-03-03 PROCEDURE — 80048 BASIC METABOLIC PNL TOTAL CA: CPT

## 2025-03-03 PROCEDURE — 94640 AIRWAY INHALATION TREATMENT: CPT

## 2025-03-03 PROCEDURE — 2500000003 HC RX 250 WO HCPCS: Performed by: INTERNAL MEDICINE

## 2025-03-03 RX ORDER — HYDROXYZINE PAMOATE 25 MG/1
50 CAPSULE ORAL 3 TIMES DAILY PRN
Status: DISCONTINUED | OUTPATIENT
Start: 2025-03-03 | End: 2025-03-03 | Stop reason: HOSPADM

## 2025-03-03 RX ORDER — ALPRAZOLAM 0.25 MG
0.25 TABLET ORAL 2 TIMES DAILY PRN
Qty: 30 TABLET | Refills: 0 | Status: SHIPPED | OUTPATIENT
Start: 2025-03-03 | End: 2025-04-02

## 2025-03-03 RX ORDER — ALBUTEROL SULFATE 2.5 MG/.5ML
2.5 SOLUTION RESPIRATORY (INHALATION) EVERY 6 HOURS PRN
Qty: 1 EACH | Refills: 1 | Status: SHIPPED | OUTPATIENT
Start: 2025-03-03

## 2025-03-03 RX ORDER — OSELTAMIVIR PHOSPHATE 75 MG/1
75 CAPSULE ORAL 2 TIMES DAILY
Qty: 4 CAPSULE | Refills: 0 | Status: SHIPPED | OUTPATIENT
Start: 2025-03-03 | End: 2025-03-05

## 2025-03-03 RX ORDER — IPRATROPIUM BROMIDE AND ALBUTEROL SULFATE 2.5; .5 MG/3ML; MG/3ML
3 SOLUTION RESPIRATORY (INHALATION)
Qty: 360 ML | Refills: 1 | Status: SHIPPED | OUTPATIENT
Start: 2025-03-03

## 2025-03-03 RX ORDER — MIRTAZAPINE 15 MG/1
15 TABLET, FILM COATED ORAL NIGHTLY
Qty: 30 TABLET | Refills: 3 | Status: SHIPPED | OUTPATIENT
Start: 2025-03-03

## 2025-03-03 RX ORDER — HYDROXYZINE PAMOATE 50 MG/1
50 CAPSULE ORAL 3 TIMES DAILY PRN
Qty: 30 CAPSULE | Refills: 1 | Status: SHIPPED | OUTPATIENT
Start: 2025-03-03 | End: 2025-03-23

## 2025-03-03 RX ORDER — MIRTAZAPINE 15 MG/1
15 TABLET, FILM COATED ORAL NIGHTLY
Status: DISCONTINUED | OUTPATIENT
Start: 2025-03-03 | End: 2025-03-03 | Stop reason: HOSPADM

## 2025-03-03 RX ADMIN — CYCLOBENZAPRINE 10 MG: 10 TABLET, FILM COATED ORAL at 08:56

## 2025-03-03 RX ADMIN — OSELTAMIVIR PHOSPHATE 75 MG: 75 CAPSULE ORAL at 08:56

## 2025-03-03 RX ADMIN — CEFTRIAXONE SODIUM 2000 MG: 2 INJECTION, POWDER, FOR SOLUTION INTRAMUSCULAR; INTRAVENOUS at 08:56

## 2025-03-03 RX ADMIN — HYDROXYZINE PAMOATE 25 MG: 25 CAPSULE ORAL at 08:56

## 2025-03-03 RX ADMIN — SODIUM CHLORIDE, PRESERVATIVE FREE 10 ML: 5 INJECTION INTRAVENOUS at 08:56

## 2025-03-03 RX ADMIN — IPRATROPIUM BROMIDE AND ALBUTEROL SULFATE 1 DOSE: 2.5; .5 SOLUTION RESPIRATORY (INHALATION) at 08:27

## 2025-03-03 ASSESSMENT — PAIN SCALES - WONG BAKER: WONGBAKER_NUMERICALRESPONSE: HURTS WORST

## 2025-03-03 ASSESSMENT — PAIN DESCRIPTION - DESCRIPTORS: DESCRIPTORS: ACHING

## 2025-03-03 ASSESSMENT — PAIN DESCRIPTION - ORIENTATION: ORIENTATION: LOWER;LEFT;RIGHT;MID

## 2025-03-03 ASSESSMENT — PAIN SCALES - GENERAL: PAINLEVEL_OUTOF10: 9

## 2025-03-03 ASSESSMENT — PAIN DESCRIPTION - LOCATION: LOCATION: ABDOMEN;BACK

## 2025-03-03 NOTE — CARE COORDINATION
Transition of Care Plan:    RUR: 14%  Prior Level of Functioning: IND  Disposition: Home  WILLY: Today  If SNF or IPR: Date FOC offered: NA  Date FOC received: NA  Accepting facility: NA  Date authorization started with reference number: NA  Date authorization received and expires: NA  Follow up appointments:   DME needed: None  Transportation at discharge: Family  IM/IMM Medicare/ letter given: Yes  Is patient a [place] and connected with [facility]? No   If yes, was  transfer form completed and [facility] notified? NA  Caregiver Contact: Navid Cuello Jr. [phone]  Discharge Caregiver contacted prior to discharge? No  Care Conference needed? No  Barriers to discharge: Discharge Order and Summary.     Current disposition is home.  Patient is cleared to discharge from strictly CM standpoint.

## 2025-03-03 NOTE — DISCHARGE SUMMARY
influenza A.    There was concern for complicated UTI in the setting of urinary retention from neurogenic bladder patient received IV Rocephin in the ED. Sepsis criteria not met.      Patient reported he has been feeling some extremely anxious for which psychiatry was consulted. Per psychiatry recommendations increased his Vistaril 250 MG BID as needed for anxiety and Remeron 15 MG BID for PTSD, insomnia and anxiety.  Patient cleared for discharge from psychiatry standpoint and follow-up with psychiatry outpatient.    In the day of the discharge, patient reports feeling well would like to go home denies any chest pain, shortness of breath, palpitations, nausea/vomiting. Patient is adamant about not getting EGD or colonoscopy even as an outpatient.    Discharge Medications:      Medication List        START taking these medications      albuterol 2.5 MG/0.5ML Nebu nebulizer solution  Commonly known as: PROVENTIL  Take 0.5 mLs by nebulization every 6 hours as needed for Wheezing     ALPRAZolam 0.25 MG tablet  Commonly known as: Xanax  Take 1 tablet by mouth 2 times daily as needed for Anxiety for up to 30 days. Max Daily Amount: 0.5 mg     hydrOXYzine pamoate 50 MG capsule  Commonly known as: VISTARIL  Take 1 capsule by mouth 3 times daily as needed for Anxiety     ipratropium 0.5 mg-albuterol 2.5 mg 0.5-2.5 (3) MG/3ML Soln nebulizer solution  Commonly known as: DUONEB  Inhale 3 mLs into the lungs in the morning and 3 mLs in the evening.     mirtazapine 15 MG tablet  Commonly known as: REMERON  Take 1 tablet by mouth nightly     oseltamivir 75 MG capsule  Commonly known as: TAMIFLU  Take 1 capsule by mouth 2 times daily for 4 doses            ASK your doctor about these medications      amitriptyline 25 MG tablet  Commonly known as: ELAVIL     ferrous sulfate 325 (65 Fe) MG tablet  Commonly known as: IRON 325     nystatin [id number] UNIT/ML suspension  Commonly known as: MYCOSTATIN     pantoprazole 40 MG tablet  Commonly

## 2025-03-03 NOTE — PLAN OF CARE
Problem: Discharge Planning  Goal: Discharge to home or other facility with appropriate resources  Outcome: Progressing  Flowsheets (Taken 3/3/2025 0900)  Discharge to home or other facility with appropriate resources: Identify barriers to discharge with patient and caregiver     Problem: Pain  Goal: Verbalizes/displays adequate comfort level or baseline comfort level  Outcome: Progressing     Problem: Infection - Adult  Goal: Absence of infection at discharge  Outcome: Progressing  Flowsheets (Taken 3/3/2025 0900)  Absence of infection at discharge: Assess and monitor for signs and symptoms of infection     Problem: Hematologic - Adult  Goal: Maintains hematologic stability  Outcome: Progressing  Flowsheets (Taken 3/3/2025 0900)  Maintains hematologic stability: Assess for signs and symptoms of bleeding or hemorrhage     Problem: Skin/Tissue Integrity  Goal: Skin integrity remains intact  Description: 1.  Monitor for areas of redness and/or skin breakdown  2.  Assess vascular access sites hourly  3.  Every 4-6 hours minimum:  Change oxygen saturation probe site  4.  Every 4-6 hours:  If on nasal continuous positive airway pressure, respiratory therapy assess nares and determine need for appliance change or resting period  Outcome: Progressing  Flowsheets  Taken 3/3/2025 1311  Skin Integrity Remains Intact: Monitor for areas of redness and/or skin breakdown  Taken 3/3/2025 0900  Skin Integrity Remains Intact: Monitor for areas of redness and/or skin breakdown     Problem: Safety - Adult  Goal: Free from fall injury  Outcome: Progressing  Flowsheets (Taken 3/3/2025 1311)  Free From Fall Injury: Instruct family/caregiver on patient safety

## 2025-03-03 NOTE — PLAN OF CARE
Problem: Discharge Planning  Goal: Discharge to home or other facility with appropriate resources  Outcome: Progressing     Problem: Pain  Goal: Verbalizes/displays adequate comfort level or baseline comfort level  Outcome: Progressing  Flowsheets (Taken 3/2/2025 2032)  Verbalizes/displays adequate comfort level or baseline comfort level:   Encourage patient to monitor pain and request assistance   Assess pain using appropriate pain scale   Administer analgesics based on type and severity of pain and evaluate response   Implement non-pharmacological measures as appropriate and evaluate response   Consider cultural and social influences on pain and pain management   Notify Licensed Independent Practitioner if interventions unsuccessful or patient reports new pain     Problem: Infection - Adult  Goal: Absence of infection at discharge  Outcome: Progressing     Problem: Hematologic - Adult  Goal: Maintains hematologic stability  Outcome: Progressing     Problem: Skin/Tissue Integrity  Goal: Skin integrity remains intact  Description: 1.  Monitor for areas of redness and/or skin breakdown  2.  Assess vascular access sites hourly  3.  Every 4-6 hours minimum:  Change oxygen saturation probe site  4.  Every 4-6 hours:  If on nasal continuous positive airway pressure, respiratory therapy assess nares and determine need for appliance change or resting period  Outcome: Progressing     Problem: Safety - Adult  Goal: Free from fall injury  Outcome: Progressing

## 2025-03-03 NOTE — BSMART NOTE
Medical  No past medical history on file.    Section VI - Living Arrangements  The patient is .  The patient lives with alone. The patient has 6 adult children.  The patient does plan to return home upon discharge. The patient's source of income comes from disability.    The patient's greatest support comes from his first wife and this person will not be involved with the treatment. The patient has not been in an event described as horrible or outside the realm of ordinary life experience either currently or in the past. The patient has not been a victim of sexual/physical abuse.    Section VII - Other Areas of Clinical Concern    The highest grade achieved is N/A with the overall quality of school experience being described as N/A. The patient is currently disabled and speaks English as a primary language.  The patient has no communication impairments affecting communication. The patient's preference for learning can be described as: can read and write adequately.  The patient's hearing is hard of hearing.  The patient's vision is normal.    The patient reports coping skills include: isolating/ putting self in time out    AMY López  [employer] LIAISON

## 2025-03-03 NOTE — PROGRESS NOTES
Hospitalist Progress Note               Daily Progress Note: 3/2/2025      Hospital Day: 3     Chief complaint:   Chief Complaint   Patient presents with    Shortness of Breath        Subjective:     Patient is seen today for follow-up. Patient seen examined at bedside. No acute events overnight. Patient has been extremely anxious and is tearful.  Patient denies wanting to get any intervention like Agusto colonoscopy.  Patient is agree to seeing a psychiatrist for his anxiety.  Vitals stable. Hemoglobin this a.m. stable at 8.8.  Potassium 3.2, replaced        Medications reviewed  Current Facility-Administered Medications   Medication Dose Route Frequency    hydrOXYzine pamoate (VISTARIL) capsule 25 mg  25 mg Oral TID PRN    ipratropium 0.5 mg-albuterol 2.5 mg (DUONEB) nebulizer solution 1 Dose  1 Dose Inhalation BID RT    oseltamivir (TAMIFLU) capsule 75 mg  75 mg Oral BID    albuterol (PROVENTIL) nebulizer solution 2.5 mg  2.5 mg Nebulization Q6H PRN    cefTRIAXone (ROCEPHIN) 2,000 mg in sterile water 20 mL IV syringe  2,000 mg IntraVENous Q24H    0.9 % sodium chloride infusion   IntraVENous PRN    sodium chloride flush 0.9 % injection 5-40 mL  5-40 mL IntraVENous 2 times per day    sodium chloride flush 0.9 % injection 5-40 mL  5-40 mL IntraVENous PRN    0.9 % sodium chloride infusion   IntraVENous PRN    potassium chloride 20 mEq/50 mL IVPB (Central Line)  20 mEq IntraVENous PRN    Or    potassium chloride 10 mEq/100 mL IVPB (Peripheral Line)  10 mEq IntraVENous PRN    magnesium sulfate 2000 mg in 50 mL IVPB premix  2,000 mg IntraVENous PRN    ondansetron (ZOFRAN-ODT) disintegrating tablet 4 mg  4 mg Oral Q8H PRN    Or    ondansetron (ZOFRAN) injection 4 mg  4 mg IntraVENous Q6H PRN    polyethylene glycol (GLYCOLAX) packet 17 g  17 g Oral Daily PRN    acetaminophen (TYLENOL) tablet 650 mg  650 mg Oral Q6H PRN    Or    acetaminophen (TYLENOL) suppository 650 mg  650 mg Rectal Q6H PRN       Review of Systems: 
   03/02/25 0900   RT Protocol   History Pulmonary Disease 0   Respiratory pattern 0   Breath sounds 0   Cough 0   Indications for Bronchodilator Therapy Decreased or absent breath sounds   Bronchodilator Assessment Score 0     RT Inhaler-Nebulizer Bronchodilator Protocol Note    There is a bronchodilator order in the chart from a provider indicating to follow the RT Bronchodilator Protocol and there is an “Initiate RT Inhaler-Nebulizer Bronchodilator Protocol” order as well (see protocol at bottom of note).    CXR Findings:  XR CHEST PORTABLE    Result Date: 2/28/2025  No acute cardiopulmonary process. Electronically signed by Casa Guadarrama      The findings from the last RT Protocol Assessment were as follows:   History Pulmonary Disease: None or smoker <15 pack years  Respiratory Pattern: Regular pattern and RR 12-20 bpm  Breath Sounds: Clear breath sounds  Cough: Strong, spontaneous, non-productive  Indication for Bronchodilator Therapy: Decreased or absent breath sounds  Bronchodilator Assessment Score: 0    Aerosolized bronchodilator medication orders have been revised according to the RT Inhaler-Nebulizer Bronchodilator Protocol below.    Respiratory Therapist to perform RT Therapy Protocol Assessment initially then follow the protocol.  Repeat RT Therapy Protocol Assessment PRN for score 0-3 or on second treatment, BID, and PRN for scores above 3.    No Indications - adjust the frequency to every 6 hours PRN wheezing or bronchospasm, if no treatments needed after 48 hours then discontinue using Per Protocol order mode.     If indication present, adjust the RT bronchodilator orders based on the Bronchodilator Assessment Score as indicated below.  Use Inhaler orders unless patient has one or more of the following: on home nebulizer, not able to hold breath for 10 seconds, is not alert and oriented, cannot activate and use MDI correctly, or respiratory rate 25 breaths per minute or more, then use the 
  Physician Progress Note      PATIENT:               CURTIS CASTREJON  CSN #:                  [id number]  :                       1960  ADMIT DATE:       2025 8:18 PM  DISCH DATE:  RESPONDING  PROVIDER #:        Boogie Mullins MD          QUERY TEXT:    Good afternoon.    Pt admitted with UTI.  Pt noted to have hx of neurogenic bladder and patient   performs self-catheterization at home.    If possible, please document in the progress notes and discharge summary if   you are evaluating and/or treating any of the following:    The medical record reflects the following:    Risk Factors: 64 yr old male, Paraplegia, Urinary retention/neurogenic bladder    Clinical Indicators:  Urinalysis: 20 ketones, small blood, 30 protein,   trace leukocyte esterase, moderate epithelial cells, 4+ bacteria, WBCs 5-10;   from  DC Summary: complicated UTI the setting of urinary retention from   neurogenic bladder; 25 20:30 WBC: 16.3, 25 15:00 WBC: 13.0; from    H&P: \"patient self caths at home\"    Treatment: Urinalysis, labs, IV Ceftriaxone    Thank you,  Poppy Amezquita RN, CDI  Options provided:  -- UTI due to self catheterization  -- UTI not due to self catheterization  -- Other - I will add my own diagnosis  -- Disagree - Not applicable / Not valid  -- Disagree - Clinically unable to determine / Unknown  -- Refer to Clinical Documentation Reviewer    PROVIDER RESPONSE TEXT:    UTI is due to self catheterization.    Query created by: Poppy Amezquita on 3/3/2025 5:51 PM      Electronically signed by:  Boogie Mullins MD 3/3/2025 5:55 PM          
CRITICAL CARE PROGRESS NOTE    Name: Navid Cuello   : 1960   MRN: [id number]   Date: 3/1/2025      Diagnoses/problem list:   Acute on chronic microcytic anemia  Influenza A positive  Leukocytosis  UTI  History of COPD, iron deficiency anemia, paraplegia, urinary retention/neurogenic bladder    24-hour events:   The unit PRBC transfused overnight.  Hgb now improved to 8.7.  Patient reports improving shortness of breath.  Still requiring 2 L NC.  Started on Tamiflu last night.  No obvious signs of bleeding reported.    Assessment and plan:   This is a 64-year-old male with COPD, iron deficiency anemia, paraplegia, urinary retention/neurogenic bladder who presented to the ER on  with complaints of generalized weakness, shortness of breath and fatigue for the last 3 days.  His hemoglobin was noted to be 4.7.  There were no obvious signs of bleeding reported by patient.  Hemoglobin improved after transfusion.  He also tested positive for influenza A and was started on Tamiflu.    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 
Discharge plan of care/case management plan validated with provider discharge order.    Pt was given all DC instructions and Ivs were taken out.     Pts transport is at the front of the hospital and was taken down by the nurse.   
Patient did not consent for blood draws for lab work this morning.    
Patient did not consent to skin assessment.  
Patient has taken off the telemetry monitor for the second time and despite adequate explanation, he has refused telemetry monitoring. Telemetry department informed. Provider on-call also informed.  
Received Order for Telemetry     Navid Cuello   1960   [id number]   Anemia [D64.9]   Boogie Mullins MD     Tele Box # [id number] placed on patient at  0242 am  ER Room # [id number]  Admitting to Room 204  Transferring Nurse Domo  Verified with Primary Nurse Kari at  0439 am   
Spiritual Health History and Assessment/Progress Note  [facility]    Initial Encounter, Spiritual/Emotional Needs, Advance Care Planning, Emotional distress,  ,      Name: Navid Cuello MRN: [id number]    Age: 64 y.o.     Sex: male   Language: English   Gnosticism: Other   Anemia     Date: 3/1/2025            Total Time Calculated: 36 min              Spiritual Assessment began in [facility] ICU        Referral/Consult From: Nurse   Encounter Overview/Reason: Initial Encounter, Spiritual/Emotional Needs, Advance Care Planning  Service Provided For: Patient    Britni, Belief, Meaning:   Patient unable to assess at this time  Family/Friends No family/friends present      Importance and Influence:  Patient unable to assess at this time  Family/Friends No family/friends present    Community:  Patient Other: Unable to assess at this time  Family/Friends No family/friends present    Assessment and Plan of Care:     Patient Interventions include: Facilitated expression of thoughts and feelings, Explored spiritual coping/struggle/distress, Assisted in Advance Care Planning conversation, and Other: Ministry of presence, active listening  Family/Friends Interventions include: No family/friends present    Patient Plan of Care: Spiritual Care available upon further referral  Family/Friends Plan of Care: No family/friends present     is responding to the consult for spiritual services and the completion of Advance Medical Directive.  confers with patient's nurse who affirms that patient was in the right frame of mind to make own decisions.  proceeds to patient's room.  meets patient sitting on the bed, leaning on his left side.  observes patient looking uneasy and confused.  introduces self and role to patient and tries to make him relax. Patient declines  interventions, notes he is having an anxiety attack and wants to be left alone.  honors his request 
and Eri in here to care for me\", patient's daughter immediately apologized on behalf and asked him to stop speaking this way.      Per primary RN attending provider stated that order for cardiac monitoring could not be discontinued and advised the RN to document the patient's refusal in a note in the patient's record.     Telemetry staff informed that patient's monitor was off and order would not be discontinued.

## 2025-03-03 NOTE — BH NOTE
g/dL    Hematocrit 32.4 (L) 36.6 - 50.3 %    MCV 74.0 (L) 80.0 - 99.0 FL    MCH 21.0 (L) 26.0 - 34.0 PG    MCHC 28.4 (L) 30.0 - 36.5 g/dL    RDW 29.3 (H) 11.5 - 14.5 %    Platelets 247 150 - 400 K/uL    MPV 10.0 8.9 - 12.9 FL    Nucleated RBCs 0.9 (H) 0.0  WBC    nRBC 0.05 (H) 0.00 - 0.01 K/uL   Phosphorus    Collection Time: 03/03/25  9:22 AM   Result Value Ref Range    Phosphorus 2.0 (L) 2.6 - 4.7 mg/dL       Physical Exam

## 2025-03-03 NOTE — CARE COORDINATION
CM met with patient at bedside to discuss discharge planning.  Patient lives at address on facesheet alone and independently.  Patient uses a cane at home.  He would like to discharge today, CM notified attending.  He states he has been working with a company to be able to obtain an electric wheelchair and needs a DME order for it.  [employer] was able to get DME order for patients electric wheelchair and gave it to the patient.  Patient plans to return home when discharged.

## 2025-03-05 LAB
BACTERIA SPEC CULT: NORMAL
Lab: NORMAL

## 2025-03-06 LAB
BACTERIA SPEC CULT: NORMAL
BACTERIA SPEC CULT: NORMAL
Lab: NORMAL
Lab: NORMAL

## (undated) DEVICE — FCPS RAD JAW 4LC 240CM W/NDL -- BX/40

## (undated) DEVICE — MOUTHPIECE ENDOSCP 20X27MM --

## (undated) DEVICE — TUBING O2 STAR LUMEN 7 FT W/ STD CONN CLR LF

## (undated) DEVICE — ENDO KIT 1: Brand: MEDLINE INDUSTRIES, INC.

## (undated) DEVICE — CANNULA NSL O2 AD 7 FT END-TIDAL CARBON DIOX VENTFLO

## (undated) DEVICE — MASK ANES INF SZ 2 PREM TAIL VLV INFL PRT UNSCENTED SGL PT